# Patient Record
Sex: MALE | Race: BLACK OR AFRICAN AMERICAN | NOT HISPANIC OR LATINO | URBAN - METROPOLITAN AREA
[De-identification: names, ages, dates, MRNs, and addresses within clinical notes are randomized per-mention and may not be internally consistent; named-entity substitution may affect disease eponyms.]

---

## 2018-11-19 ENCOUNTER — EMERGENCY (EMERGENCY)
Facility: HOSPITAL | Age: 78
LOS: 1 days | Discharge: HOME | End: 2018-11-19
Attending: EMERGENCY MEDICINE | Admitting: EMERGENCY MEDICINE
Payer: MEDICARE

## 2018-11-19 VITALS
DIASTOLIC BLOOD PRESSURE: 71 MMHG | HEART RATE: 86 BPM | RESPIRATION RATE: 18 BRPM | TEMPERATURE: 99 F | SYSTOLIC BLOOD PRESSURE: 135 MMHG

## 2018-11-19 VITALS
SYSTOLIC BLOOD PRESSURE: 142 MMHG | RESPIRATION RATE: 16 BRPM | HEART RATE: 95 BPM | DIASTOLIC BLOOD PRESSURE: 67 MMHG | OXYGEN SATURATION: 100 % | TEMPERATURE: 99 F

## 2018-11-19 PROCEDURE — 73562 X-RAY EXAM OF KNEE 3: CPT | Mod: 26,LT

## 2018-11-19 PROCEDURE — 99283 EMERGENCY DEPT VISIT LOW MDM: CPT

## 2018-11-19 RX ORDER — LIDOCAINE 4 G/100G
1 CREAM TOPICAL ONCE
Qty: 0 | Refills: 0 | Status: COMPLETED | OUTPATIENT
Start: 2018-11-19 | End: 2018-11-19

## 2018-11-19 RX ORDER — ACETAMINOPHEN 500 MG
650 TABLET ORAL ONCE
Qty: 0 | Refills: 0 | Status: COMPLETED | OUTPATIENT
Start: 2018-11-19 | End: 2018-11-19

## 2018-11-19 RX ADMIN — LIDOCAINE 1 PATCH: 4 CREAM TOPICAL at 17:10

## 2018-11-19 RX ADMIN — Medication 650 MILLIGRAM(S): at 17:10

## 2018-11-19 NOTE — ED PROVIDER NOTE - PROGRESS NOTE DETAILS
KATHYA Fernandez: Received sign out from KATHYA WANG to reassess patient and follow up on XR L knee. XR unremarkable. Pt able to ambulate w/ knee immobilizer and cane. Will dc w/ close ortho follow up

## 2018-11-19 NOTE — ED PROVIDER NOTE - OBJECTIVE STATEMENT
78 y/o M pt with PMHx of DM, BIB son, arrives to the ED c/o left knee pain/swelling and difficulty bearing weight s/p twisting his left knee 1 week ago. Pt reports that he has been walking earlier this morning when he "almost fell," leading to onset of left knee pain. Tylenol, last taken last week, for no relief. Ambulatory normally without any assistance. Denies numbness/tingling or any other complaints. NKDA.

## 2018-11-19 NOTE — ED PROVIDER NOTE - LOWER EXTREMITY EXAM, LEFT
edema to left knee with no obvious deformity. knee joint is mildly warm to touch. able to extend against gravity. no calf TTP. trace pedal edema equal b/l/JOINT SWELLING/SWELLING

## 2018-11-19 NOTE — ED PROVIDER NOTE - NSFOLLOWUPINSTRUCTIONS_ED_ALL_ED_FT
Follow up with ortho this week.  Referral list provided.  Rest, ice and elevate knee.  Ambulate as tolerated with use of cane. Keep knee immobilizer on during the day.  Take Tylenol as needed for pain. Worsening pain, swelling, weakness, numbness return to ER

## 2018-11-26 ENCOUNTER — APPOINTMENT (OUTPATIENT)
Dept: INTERNAL MEDICINE | Facility: CLINIC | Age: 78
End: 2018-11-26

## 2018-11-29 ENCOUNTER — APPOINTMENT (OUTPATIENT)
Dept: ORTHOPEDIC SURGERY | Facility: CLINIC | Age: 78
End: 2018-11-29
Payer: MEDICARE

## 2018-11-29 VITALS — HEIGHT: 65 IN | WEIGHT: 155 LBS | BODY MASS INDEX: 25.83 KG/M2

## 2018-11-29 PROCEDURE — 73562 X-RAY EXAM OF KNEE 3: CPT | Mod: LT

## 2018-11-29 PROCEDURE — 99204 OFFICE O/P NEW MOD 45 MIN: CPT | Mod: 25

## 2018-11-29 PROCEDURE — 20610 DRAIN/INJ JOINT/BURSA W/O US: CPT | Mod: LT

## 2018-12-03 ENCOUNTER — OTHER (OUTPATIENT)
Age: 78
End: 2018-12-03

## 2018-12-06 ENCOUNTER — OUTPATIENT (OUTPATIENT)
Dept: OUTPATIENT SERVICES | Facility: HOSPITAL | Age: 78
LOS: 1 days | End: 2018-12-06
Payer: MEDICARE

## 2018-12-06 ENCOUNTER — APPOINTMENT (OUTPATIENT)
Dept: MRI IMAGING | Facility: CLINIC | Age: 78
End: 2018-12-06
Payer: MEDICARE

## 2018-12-06 DIAGNOSIS — M17.12 UNILATERAL PRIMARY OSTEOARTHRITIS, LEFT KNEE: ICD-10-CM

## 2018-12-06 PROCEDURE — 73721 MRI JNT OF LWR EXTRE W/O DYE: CPT

## 2018-12-06 PROCEDURE — 73721 MRI JNT OF LWR EXTRE W/O DYE: CPT | Mod: 26,LT

## 2018-12-13 ENCOUNTER — APPOINTMENT (OUTPATIENT)
Dept: ORTHOPEDIC SURGERY | Facility: CLINIC | Age: 78
End: 2018-12-13
Payer: MEDICARE

## 2018-12-13 VITALS — WEIGHT: 155 LBS | BODY MASS INDEX: 25.83 KG/M2 | HEIGHT: 65 IN

## 2018-12-13 PROCEDURE — 99214 OFFICE O/P EST MOD 30 MIN: CPT

## 2019-01-10 ENCOUNTER — APPOINTMENT (OUTPATIENT)
Dept: ORTHOPEDIC SURGERY | Facility: CLINIC | Age: 79
End: 2019-01-10
Payer: COMMERCIAL

## 2019-01-10 VITALS — BODY MASS INDEX: 25.83 KG/M2 | HEIGHT: 65 IN | WEIGHT: 155 LBS

## 2019-01-10 DIAGNOSIS — M17.12 UNILATERAL PRIMARY OSTEOARTHRITIS, LEFT KNEE: ICD-10-CM

## 2019-01-10 PROCEDURE — 73562 X-RAY EXAM OF KNEE 3: CPT | Mod: LT

## 2019-01-10 PROCEDURE — 99214 OFFICE O/P EST MOD 30 MIN: CPT

## 2019-01-11 PROBLEM — M17.12 ARTHRITIS OF KNEE, LEFT: Status: ACTIVE | Noted: 2018-11-29

## 2019-01-11 NOTE — PHYSICAL EXAM
[Poor Appearance] : well-appearing [Acute Distress] : not in acute distress [Obese] : obese [FreeTextEntry2] : Patient appears stated age in no acute respiratory distress. Patient is alert oriented x3. Patient has normal mood and affect. Gait examination is  patient is in a wheelchair\par Right knee exam\par Range of motion of the knee is 0-120°. \par Skin is normal.  No rash.\par There is no effusion. No medial or lateral joint line tenderness. No swelling, no pitting edema.\par Overall alignment of the knee is then slight varus. Good anterior posterior stability. Firm endpoints on anterior and posterior drawer. \par Medial lateral stability is intact. Firm endpoint on medial and lateral stress testing .\par Lea test is negative.\par Quadriceps strength 5/ 5. There is no loss of muscle volume in the thigh. \par Good anterior posterior and mediolateral stability.\par Sensation in the extremities intact. \par Discrimination is intact. Good DP and PT pulses.\par 		\par \par Bilateral hip exam\par On inspection of the hip shows skin is normal. No evidence of rash. \par No loss of muscle.  Abductor strength is 5 out of 5. Hip flexor strength is 5.\par Range of motion of the hip at 90° flexion internal rotation is 15° external rotation is 30° pain-free. \par Hip has good stability in anterior and posterior direction. \par On lateral decubitus  examination there is no tenderness in the greater trochanter. \par Lower Extremity Examination \par Bilateral lower extremity skin is normal. There is no rash. There is no edema and lymphadenopathy.  DP and PT pulses intact. Sensation is intact.\par \par Left knee exam\par There is minimal to moderate effusion. Range of motion is 0-120°. Painful at the extremes of range of motion. \par There is medial and lateral joint line tenderness. \par Quadriceps strength is 4/5. There is some muscle loss in the quadriceps. \par Anteroposterior and mediolateral stability is intact Lea test is negative. Alignment of the knee is in 5° of varus.\par  [de-identified] : x-rays of the eft knee 3 view  moderate arthritis\par \par MRI of the left knee was MRI shows the medial side compression fracture and a subchondral fracture without any gross evidence of depression compression\par \par X-rays from today show some mild depressed medial plateau

## 2019-01-11 NOTE — HISTORY OF PRESENT ILLNESS
[Pain] : pain [Worsening] : worsening [___ mths] : [unfilled] month(s) ago [6] : currently ~his/her~ pain is 6 out of 10 [Walking] : walking [Intermit.] : ~He/She~ states the symptoms seem to be intermittent [All Hx] : past medical, family, and social [All] : medication and allergy [All Other ROS Normal] : All other review of systems are negative except as noted [Joint Stiffness] : joint stiffness [Muscle Aches] : muscle aches [Chills] : no chills [Fever] : no fever [Wt. Gain___lbs] : no recent weight gain [Wt. Loss___lbs] : no recent ~M [unfilled] weight loss [Joint Pain] : no joint pain [Chest Pain] : no chest pain [Edema] : no edema [Palpitations] : no palpitations [PND] : no PND [Cough] : no cough [Dyspnea] : no shortness of breath [Pain w/ Breathing] : no pleuritic chest [SOB on Exertion] : no shortness of breath during exertion [FreeTextEntry1] : 76 y/o male complains of left knee pain.\par  [FreeTextEntry2] : Patient presents to office walking with a cane following up for left knee pain, since last visit symptoms have improved 70%. \par patient was seen at [facility].  images were obtained.  he ha been taking Ibuprofen that helps pain somewhat.  pain is constant and is worsened by walking.  \par The patient is having severe difficulty putting weight on his leg. He is a healthy 77-year-old man for a roll he was not walking with the cane now he cannot right on the legs a\par \par No trauma fevers or chills

## 2019-01-11 NOTE — DISCUSSION/SUMMARY
[de-identified] : Patient has healing medial tibial plateau fracture with mild depression. Patient is advised to continue activity as tolerated. 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 Continue duty as tolerated follow up in 6 months

## 2019-10-04 NOTE — ED PROVIDER NOTE - AREA
Only rest portion of stress test completed..  Stress portion canceled by patient due to insurance concerns and need to reschedule vascular surgery secondary to need for optimization of diabetic control.  Will need to be rescheduled prior to clearance once his insurance situation figured out.    He had a large fixed defect in the inferior/inferolateral wall.  LVEF 30% with global hypokinesis.  This is consistent with known anatomy.  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  Therefore this is an expected finding.  Still needs the stress portion to exclude presence of reversible defects in other territories.   Continue with present medical management    Will forward an update to his PCP as well. generalized

## 2021-01-12 NOTE — ED ADULT TRIAGE NOTE - CHIEF COMPLAINT QUOTE
Pt w/ hx of prostate CA w/ seeds DM, c/o Left knee pain since twisting his knee while gardening today.  pt reports pain only when weight bearing, Pt can walk though slowly and with pain.  Pt denies loss of sensation or ROM to LLE
n/a

## 2021-02-04 ENCOUNTER — EMERGENCY (EMERGENCY)
Facility: HOSPITAL | Age: 81
LOS: 1 days | Discharge: HOME | End: 2021-02-04
Attending: EMERGENCY MEDICINE | Admitting: EMERGENCY MEDICINE
Payer: MEDICARE

## 2021-02-04 VITALS
HEART RATE: 92 BPM | TEMPERATURE: 98 F | HEIGHT: 63 IN | OXYGEN SATURATION: 100 % | SYSTOLIC BLOOD PRESSURE: 134 MMHG | RESPIRATION RATE: 16 BRPM | DIASTOLIC BLOOD PRESSURE: 66 MMHG

## 2021-02-04 PROCEDURE — 99284 EMERGENCY DEPT VISIT MOD MDM: CPT | Mod: GC

## 2021-02-04 NOTE — ED ADULT TRIAGE NOTE - CHIEF COMPLAINT QUOTE
Pt was sent in by PMD to rule out right leg DVT. pt c/o right leg swelling and pain x 2 weeks and generalized  sharp chest pain x 2 days. Pt right left noted to be swollen and warm to touch. No complaints of  headache, nausea, dizziness, vomiting  SOB, fever, chills verbalized.

## 2021-02-05 VITALS
DIASTOLIC BLOOD PRESSURE: 60 MMHG | SYSTOLIC BLOOD PRESSURE: 129 MMHG | RESPIRATION RATE: 17 BRPM | OXYGEN SATURATION: 99 % | TEMPERATURE: 98 F | HEART RATE: 72 BPM

## 2021-02-05 LAB
ALBUMIN SERPL ELPH-MCNC: 3.7 G/DL — SIGNIFICANT CHANGE UP (ref 3.3–5)
ALP SERPL-CCNC: 87 U/L — SIGNIFICANT CHANGE UP (ref 40–120)
ALT FLD-CCNC: 24 U/L — SIGNIFICANT CHANGE UP (ref 4–41)
ANION GAP SERPL CALC-SCNC: 11 MMOL/L — SIGNIFICANT CHANGE UP (ref 7–14)
APTT BLD: 28.6 SEC — SIGNIFICANT CHANGE UP (ref 27–36.3)
AST SERPL-CCNC: 25 U/L — SIGNIFICANT CHANGE UP (ref 4–40)
BASOPHILS # BLD AUTO: 0.03 K/UL — SIGNIFICANT CHANGE UP (ref 0–0.2)
BASOPHILS NFR BLD AUTO: 0.5 % — SIGNIFICANT CHANGE UP (ref 0–2)
BILIRUB SERPL-MCNC: 0.4 MG/DL — SIGNIFICANT CHANGE UP (ref 0.2–1.2)
BUN SERPL-MCNC: 20 MG/DL — SIGNIFICANT CHANGE UP (ref 7–23)
CALCIUM SERPL-MCNC: 9.6 MG/DL — SIGNIFICANT CHANGE UP (ref 8.4–10.5)
CHLORIDE SERPL-SCNC: 107 MMOL/L — SIGNIFICANT CHANGE UP (ref 98–107)
CO2 SERPL-SCNC: 25 MMOL/L — SIGNIFICANT CHANGE UP (ref 22–31)
CREAT SERPL-MCNC: 1.18 MG/DL — SIGNIFICANT CHANGE UP (ref 0.5–1.3)
EOSINOPHIL # BLD AUTO: 0.22 K/UL — SIGNIFICANT CHANGE UP (ref 0–0.5)
EOSINOPHIL NFR BLD AUTO: 3.9 % — SIGNIFICANT CHANGE UP (ref 0–6)
GLUCOSE SERPL-MCNC: 137 MG/DL — HIGH (ref 70–99)
HCT VFR BLD CALC: 33.9 % — LOW (ref 39–50)
HGB BLD-MCNC: 10.4 G/DL — LOW (ref 13–17)
IANC: 3.24 K/UL — SIGNIFICANT CHANGE UP (ref 1.5–8.5)
IMM GRANULOCYTES NFR BLD AUTO: 0.5 % — SIGNIFICANT CHANGE UP (ref 0–1.5)
INR BLD: 1.19 RATIO — HIGH (ref 0.88–1.16)
LYMPHOCYTES # BLD AUTO: 1.82 K/UL — SIGNIFICANT CHANGE UP (ref 1–3.3)
LYMPHOCYTES # BLD AUTO: 32 % — SIGNIFICANT CHANGE UP (ref 13–44)
MCHC RBC-ENTMCNC: 25.6 PG — LOW (ref 27–34)
MCHC RBC-ENTMCNC: 30.7 GM/DL — LOW (ref 32–36)
MCV RBC AUTO: 83.5 FL — SIGNIFICANT CHANGE UP (ref 80–100)
MONOCYTES # BLD AUTO: 0.35 K/UL — SIGNIFICANT CHANGE UP (ref 0–0.9)
MONOCYTES NFR BLD AUTO: 6.2 % — SIGNIFICANT CHANGE UP (ref 2–14)
NEUTROPHILS # BLD AUTO: 3.24 K/UL — SIGNIFICANT CHANGE UP (ref 1.8–7.4)
NEUTROPHILS NFR BLD AUTO: 56.9 % — SIGNIFICANT CHANGE UP (ref 43–77)
NRBC # BLD: 0 /100 WBCS — SIGNIFICANT CHANGE UP
NRBC # FLD: 0 K/UL — SIGNIFICANT CHANGE UP
PLATELET # BLD AUTO: 268 K/UL — SIGNIFICANT CHANGE UP (ref 150–400)
POTASSIUM SERPL-MCNC: 5.1 MMOL/L — SIGNIFICANT CHANGE UP (ref 3.5–5.3)
POTASSIUM SERPL-SCNC: 5.1 MMOL/L — SIGNIFICANT CHANGE UP (ref 3.5–5.3)
PROT SERPL-MCNC: 6.7 G/DL — SIGNIFICANT CHANGE UP (ref 6–8.3)
PROTHROM AB SERPL-ACNC: 13.5 SEC — SIGNIFICANT CHANGE UP (ref 10.6–13.6)
RBC # BLD: 4.06 M/UL — LOW (ref 4.2–5.8)
RBC # FLD: 17.2 % — HIGH (ref 10.3–14.5)
SARS-COV-2 RNA SPEC QL NAA+PROBE: SIGNIFICANT CHANGE UP
SODIUM SERPL-SCNC: 143 MMOL/L — SIGNIFICANT CHANGE UP (ref 135–145)
WBC # BLD: 5.69 K/UL — SIGNIFICANT CHANGE UP (ref 3.8–10.5)
WBC # FLD AUTO: 5.69 K/UL — SIGNIFICANT CHANGE UP (ref 3.8–10.5)

## 2021-02-05 PROCEDURE — 93971 EXTREMITY STUDY: CPT | Mod: 26,LT

## 2021-02-05 RX ADMIN — Medication 300 MILLIGRAM(S): at 06:26

## 2021-02-05 NOTE — ED PROVIDER NOTE - PATIENT PORTAL LINK FT
You can access the FollowMyHealth Patient Portal offered by [facility] by registering at the following website: [email or web address]. By joining [facility]’s FollowMyHealth portal, you will also be able to view your health information using other applications (apps) compatible with our system.

## 2021-02-05 NOTE — ED PROVIDER NOTE - ATTENDING CONTRIBUTION TO CARE
79 y/o diabetic M sent for RLE swelling.  Pt reports 2 weeks of progressive redness and swelling to RLE.  He went to see his PMD today for routine follow-up and sent in to ER to r/o DVT.  Pt denies pain to extremity.  No recent fall or injury.  No fever, chills, cp, sob, palpitations, abd pain, n/v.  Well appearing elderly male, lying comfortably in stretcher, awake and alert, nontoxic.  AF/VSS.  Lungs cta bl.  Cards nl S1/S2, RRR, no MRG.  Abd soft ntnd.  (+)redness, warmth, swelling and induration to RLE to calf extending to medial R knee.  No crepitus, +right calf tenderness.  Normal distal cap refill to  bilat lower ext with 2+ DP pulses.  Concern for dvt vs cellulitis, no e/o necrotizing infection, no trauma.  Will obtain labs, dvt study, reassess.

## 2021-02-05 NOTE — ED PROVIDER NOTE - PROGRESS NOTE DETAILS
Patient will cellulitis. Will tx with clinda 300mg one dose here and snet the rest to his pharmacy. HE will follow up with his doctor in the next few days. PT seen and reassessed.  Patient symptomatically improved.   AAOX3, NAD, VSS.  Discussed test results w/ patient. Patient verbalized understanding of hospital course and outpatient plans, has decisional making capacity.  Will follow-up with Primary care doctor in the next 5-7 days; patient will call for an appointment. Will return to the ED if there is any worsening of symptoms or development of new symptoms.  Patient able to ambulate w/o difficulty, is tolerating PO intake

## 2021-02-05 NOTE — ED ADULT NURSE NOTE - OBJECTIVE STATEMENT
pt arrives w/ c/o right leg pain x 2 days. pt states he was sent by MD to r/o DVT. pt states his right leg is more swollen than left leg. pt also c/o chest pain x 2 days. pt denies any sob, fever, cough, chills. Pt resting comfortable and in NAD. waiting further orders will continue to monitor.

## 2021-02-05 NOTE — ED PROVIDER NOTE - NS ED ROS FT
Gen: No fever, normal appetite  Eyes: No eye irritation or discharge  ENT: No ear pain, congestion, sore throat  Resp: No cough or trouble breathing  Cardiovascular: No chest pain or palpitation  Gastroenteric: No nausea/vomiting, diarrhea, constipation  :  No change in urine output; no dysuria  MS: No joint or muscle pain  Skin: No rashes  Neuro: No headache; no abnormal movements  Remainder negative, except as per the HPI.

## 2021-02-05 NOTE — ED PROVIDER NOTE - NSFOLLOWUPINSTRUCTIONS_ED_ALL_ED_FT
You were seen for cellulitis.     Seek immediate medical assistance for any new or worsening symptoms.     Clindamycin, an antibiotic, was sent your pharmacy. Take 4 pills per day for 5 days.     Please take 600 mg of Ibuprofen (aka Motrin, Advil) and/or 650 mg Acetaminophen (aka Tylenol) every 6 hours, as needed, for mild-moderate pain.     Follow up with your primary care doctor this week.

## 2021-02-05 NOTE — ED PROVIDER NOTE - OBJECTIVE STATEMENT
80M, pm DM, sent in by PCP for right lower leg swelling and pain and redness concerning for DVT. Patient denies cough hemoptysis sob chest pain. No loc. Not on blood thinners.

## 2021-02-05 NOTE — ED PROVIDER NOTE - CLINICAL SUMMARY MEDICAL DECISION MAKING FREE TEXT BOX
[place] - elderly male presents with right lower leg swelling redness pain, concerning for DVT. Will ultrasound. If DVT - will start AC and d/c with PCP follow up.

## 2021-02-05 NOTE — ED PROVIDER NOTE - PHYSICAL EXAMINATION
Physical Exam:    I have reviewed the triage vital signs.    Gen: NAD, AOx3, non-toxic appearing, able to ambulate without assistance  Head and Neck: NCAT, Neck supple without meningismus   HEENT: EOMI, PEERLA, normal conjunctiva, tongue midline, oral mucosa moist  Lung: CTAB, no respiratory distress, no wheezes/rhonchi/rales B/L, speaking in full sentences  CV: RRR, no murmurs, rubs or gallops  Abd: soft, NT, ND, no guarding, no rigidity, no rebound tenderness, no CVA tenderness, no masses.   MSK: no gross deformities, ROM normal in UE/LE, no back tenderness. RLE- erythema, swelling, ttp  right calf. No palpaple cord.   Neuro: CNs II-XII grossly intact. No focal sensory or motor deficits  Skin: Warm, well perfused, no rash, no leg swelling  Psych: Appropriate mood and affect

## 2021-02-05 NOTE — ED PROVIDER NOTE - PSH
Arm fracture, right  during childhood  H/O left inguinal hernia repair    S/P Appendectomy    S/P colonoscopy with polypectomy  2011 - benign  S/P laparoscopic cholecystectomy  2009

## 2021-03-15 ENCOUNTER — APPOINTMENT (OUTPATIENT)
Dept: VASCULAR SURGERY | Facility: CLINIC | Age: 81
End: 2021-03-15
Payer: MEDICARE

## 2021-03-15 VITALS
BODY MASS INDEX: 26.14 KG/M2 | WEIGHT: 155 LBS | DIASTOLIC BLOOD PRESSURE: 87 MMHG | HEIGHT: 64.5 IN | SYSTOLIC BLOOD PRESSURE: 147 MMHG | HEART RATE: 109 BPM

## 2021-03-15 VITALS — TEMPERATURE: 97.7 F

## 2021-03-15 DIAGNOSIS — L97.929 NON-PRESSURE CHRONIC ULCER OF UNSPECIFIED PART OF RIGHT LOWER LEG WITH UNSPECIFIED SEVERITY: ICD-10-CM

## 2021-03-15 DIAGNOSIS — L97.919 NON-PRESSURE CHRONIC ULCER OF UNSPECIFIED PART OF RIGHT LOWER LEG WITH UNSPECIFIED SEVERITY: ICD-10-CM

## 2021-03-15 DIAGNOSIS — I73.9 PERIPHERAL VASCULAR DISEASE, UNSPECIFIED: ICD-10-CM

## 2021-03-15 PROCEDURE — 99203 OFFICE O/P NEW LOW 30 MIN: CPT | Mod: 25

## 2021-03-15 PROCEDURE — 99072 ADDL SUPL MATRL&STAF TM PHE: CPT

## 2021-03-15 PROCEDURE — 29580 STRAPPING UNNA BOOT: CPT | Mod: 50

## 2021-03-15 PROCEDURE — 93923 UPR/LXTR ART STDY 3+ LVLS: CPT

## 2021-03-15 PROCEDURE — 93970 EXTREMITY STUDY: CPT

## 2021-03-15 NOTE — CONSULT LETTER
[Dear  ___] : Dear  [unfilled], [Consult Letter:] : I had the pleasure of evaluating your patient, [unfilled]. [Please see my note below.] : Please see my note below. [Consult Closing:] : Thank you very much for allowing me to participate in the care of this patient.  If you have any questions, please do not hesitate to contact me. [Sincerely,] : Sincerely, [FreeTextEntry3] : Skip Zepeda M.D., F.MAGEDS., R.[email or web address].\par  of Vascular Surgery\par Assistant Professor of Radiology\par Director of Endovascular Program/ [facility] Center\par [facility]

## 2021-03-15 NOTE — PHYSICAL EXAM
[JVD] : no jugular venous distention  [Normal Breath Sounds] : Normal breath sounds [Normal Heart Sounds] : normal heart sounds [2+] : left 2+ [Ankle Swelling (On Exam)] : present [Ankle Swelling On The Right] : of the right ankle [Ankle Swelling On The Left] : moderate [Varicose Veins Of Lower Extremities] : not present [] : not present [Abdomen Masses] : No abdominal masses [Skin Ulcer] : ulcer [Alert] : alert [Oriented to Person] : oriented to person [Oriented to Place] : oriented to place [de-identified] : Bilateral medial ankle superficial ulceration with no evidence of cellulitis or drainage.  Right lower extremity is more swollen with thickening of the skin.

## 2021-03-15 NOTE — HISTORY OF PRESENT ILLNESS
[FreeTextEntry1] : Patient is an 80-year-old male with past medical history significant for diabetes, hypertension who presents to us for evaluation of bilateral lower extremity ulcerations.  Patient recently had a hospital admission for right lower extremity swelling.  There was no evidence of DVT at the time.  Patient was treated with antibiotics for treatment of cellulitis with improvement of symptoms.  Patient continues to have significant swelling and redness of the right lower extremity but denies any fevers chills or significant pain.  Patient has small superficial ulcerations of bilateral lower leg which currently being treated with local wound care at the wound care clinic.

## 2021-03-15 NOTE — ASSESSMENT
[FreeTextEntry1] : Patient with resolving cellulitis of the right lower extremity.  There is no evidence of significant arterial insufficiency.  There is no evidence of DVT of right lower extremity.  There is no significant reflux.  Recommend continuation of local wound care with Unna boots and elevation.

## 2021-03-16 PROBLEM — L97.919 ULCERS OF BOTH LOWER EXTREMITIES: Status: ACTIVE | Noted: 2021-03-15

## 2021-03-25 RX ORDER — DOXYCYCLINE HYCLATE 100 MG/1
100 CAPSULE ORAL
Qty: 20 | Refills: 0 | Status: DISCONTINUED | COMMUNITY
Start: 2021-03-04 | End: 2021-03-25

## 2021-03-25 RX ORDER — CLINDAMYCIN HYDROCHLORIDE 150 MG/1
150 CAPSULE ORAL
Qty: 39 | Refills: 0 | Status: DISCONTINUED | COMMUNITY
Start: 2021-02-05 | End: 2021-03-25

## 2021-03-27 ENCOUNTER — APPOINTMENT (OUTPATIENT)
Dept: CT IMAGING | Facility: IMAGING CENTER | Age: 81
End: 2021-03-27
Payer: MEDICARE

## 2021-03-27 ENCOUNTER — OUTPATIENT (OUTPATIENT)
Dept: OUTPATIENT SERVICES | Facility: HOSPITAL | Age: 81
LOS: 1 days | End: 2021-03-27
Payer: COMMERCIAL

## 2021-03-27 DIAGNOSIS — L97.919 NON-PRESSURE CHRONIC ULCER OF UNSPECIFIED PART OF RIGHT LOWER LEG WITH UNSPECIFIED SEVERITY: ICD-10-CM

## 2021-03-27 PROCEDURE — 74174 CTA ABD&PLVS W/CONTRAST: CPT | Mod: 26

## 2021-03-27 PROCEDURE — 74174 CTA ABD&PLVS W/CONTRAST: CPT

## 2022-05-05 ENCOUNTER — INPATIENT (INPATIENT)
Facility: HOSPITAL | Age: 82
LOS: 4 days | Discharge: HOME | End: 2022-05-10
Attending: HOSPITALIST | Admitting: HOSPITALIST
Payer: MEDICARE

## 2022-05-05 VITALS
OXYGEN SATURATION: 100 % | DIASTOLIC BLOOD PRESSURE: 66 MMHG | HEIGHT: 63 IN | HEART RATE: 101 BPM | SYSTOLIC BLOOD PRESSURE: 125 MMHG | RESPIRATION RATE: 18 BRPM | TEMPERATURE: 98 F

## 2022-05-05 DIAGNOSIS — N50.89 OTHER SPECIFIED DISORDERS OF THE MALE GENITAL ORGANS: ICD-10-CM

## 2022-05-05 LAB
ALBUMIN SERPL ELPH-MCNC: 3.6 G/DL — SIGNIFICANT CHANGE UP (ref 3.3–5)
ALP SERPL-CCNC: 79 U/L — SIGNIFICANT CHANGE UP (ref 40–120)
ALT FLD-CCNC: 56 U/L — HIGH (ref 4–41)
ANION GAP SERPL CALC-SCNC: 10 MMOL/L — SIGNIFICANT CHANGE UP (ref 7–14)
APPEARANCE UR: CLEAR — SIGNIFICANT CHANGE UP
AST SERPL-CCNC: 41 U/L — HIGH (ref 4–40)
BACTERIA # UR AUTO: NEGATIVE — SIGNIFICANT CHANGE UP
BASOPHILS # BLD AUTO: 0.03 K/UL — SIGNIFICANT CHANGE UP (ref 0–0.2)
BASOPHILS NFR BLD AUTO: 0.6 % — SIGNIFICANT CHANGE UP (ref 0–2)
BILIRUB SERPL-MCNC: 0.4 MG/DL — SIGNIFICANT CHANGE UP (ref 0.2–1.2)
BILIRUB UR-MCNC: NEGATIVE — SIGNIFICANT CHANGE UP
BUN SERPL-MCNC: 27 MG/DL — HIGH (ref 7–23)
CALCIUM SERPL-MCNC: 8.8 MG/DL — SIGNIFICANT CHANGE UP (ref 8.4–10.5)
CHLORIDE SERPL-SCNC: 109 MMOL/L — HIGH (ref 98–107)
CO2 SERPL-SCNC: 23 MMOL/L — SIGNIFICANT CHANGE UP (ref 22–31)
COLOR SPEC: ABNORMAL
CREAT SERPL-MCNC: 1.11 MG/DL — SIGNIFICANT CHANGE UP (ref 0.5–1.3)
DIFF PNL FLD: NEGATIVE — SIGNIFICANT CHANGE UP
EGFR: 67 ML/MIN/1.73M2 — SIGNIFICANT CHANGE UP
EOSINOPHIL # BLD AUTO: 0.08 K/UL — SIGNIFICANT CHANGE UP (ref 0–0.5)
EOSINOPHIL NFR BLD AUTO: 1.6 % — SIGNIFICANT CHANGE UP (ref 0–6)
EPI CELLS # UR: 1 /HPF — SIGNIFICANT CHANGE UP (ref 0–5)
GLUCOSE BLDC GLUCOMTR-MCNC: 101 MG/DL — HIGH (ref 70–99)
GLUCOSE SERPL-MCNC: 116 MG/DL — HIGH (ref 70–99)
GLUCOSE UR QL: NEGATIVE — SIGNIFICANT CHANGE UP
HCT VFR BLD CALC: 36 % — LOW (ref 39–50)
HGB BLD-MCNC: 11.9 G/DL — LOW (ref 13–17)
IANC: 3.03 K/UL — SIGNIFICANT CHANGE UP (ref 1.8–7.4)
IMM GRANULOCYTES NFR BLD AUTO: 0.2 % — SIGNIFICANT CHANGE UP (ref 0–1.5)
KETONES UR-MCNC: NEGATIVE — SIGNIFICANT CHANGE UP
LEUKOCYTE ESTERASE UR-ACNC: NEGATIVE — SIGNIFICANT CHANGE UP
LYMPHOCYTES # BLD AUTO: 1.3 K/UL — SIGNIFICANT CHANGE UP (ref 1–3.3)
LYMPHOCYTES # BLD AUTO: 26.1 % — SIGNIFICANT CHANGE UP (ref 13–44)
MCHC RBC-ENTMCNC: 28.1 PG — SIGNIFICANT CHANGE UP (ref 27–34)
MCHC RBC-ENTMCNC: 33.1 GM/DL — SIGNIFICANT CHANGE UP (ref 32–36)
MCV RBC AUTO: 84.9 FL — SIGNIFICANT CHANGE UP (ref 80–100)
MONOCYTES # BLD AUTO: 0.54 K/UL — SIGNIFICANT CHANGE UP (ref 0–0.9)
MONOCYTES NFR BLD AUTO: 10.8 % — SIGNIFICANT CHANGE UP (ref 2–14)
NEUTROPHILS # BLD AUTO: 3.03 K/UL — SIGNIFICANT CHANGE UP (ref 1.8–7.4)
NEUTROPHILS NFR BLD AUTO: 60.7 % — SIGNIFICANT CHANGE UP (ref 43–77)
NITRITE UR-MCNC: NEGATIVE — SIGNIFICANT CHANGE UP
NRBC # BLD: 0 /100 WBCS — SIGNIFICANT CHANGE UP
NRBC # FLD: 0 K/UL — SIGNIFICANT CHANGE UP
NT-PROBNP SERPL-SCNC: 3872 PG/ML — HIGH
PH UR: 7 — SIGNIFICANT CHANGE UP (ref 5–8)
PLATELET # BLD AUTO: 230 K/UL — SIGNIFICANT CHANGE UP (ref 150–400)
POTASSIUM SERPL-MCNC: 5.2 MMOL/L — SIGNIFICANT CHANGE UP (ref 3.5–5.3)
POTASSIUM SERPL-SCNC: 5.2 MMOL/L — SIGNIFICANT CHANGE UP (ref 3.5–5.3)
PROT SERPL-MCNC: 5.6 G/DL — LOW (ref 6–8.3)
PROT UR-MCNC: ABNORMAL
RBC # BLD: 4.24 M/UL — SIGNIFICANT CHANGE UP (ref 4.2–5.8)
RBC # FLD: 17.6 % — HIGH (ref 10.3–14.5)
RBC CASTS # UR COMP ASSIST: 2 /HPF — SIGNIFICANT CHANGE UP (ref 0–4)
SODIUM SERPL-SCNC: 142 MMOL/L — SIGNIFICANT CHANGE UP (ref 135–145)
SP GR SPEC: 1.03 — SIGNIFICANT CHANGE UP (ref 1–1.05)
TROPONIN T, HIGH SENSITIVITY RESULT: 65 NG/L — CRITICAL HIGH
TROPONIN T, HIGH SENSITIVITY RESULT: 66 NG/L — CRITICAL HIGH
UROBILINOGEN FLD QL: SIGNIFICANT CHANGE UP
WBC # BLD: 4.99 K/UL — SIGNIFICANT CHANGE UP (ref 3.8–10.5)
WBC # FLD AUTO: 4.99 K/UL — SIGNIFICANT CHANGE UP (ref 3.8–10.5)
WBC UR QL: 1 /HPF — SIGNIFICANT CHANGE UP (ref 0–5)

## 2022-05-05 PROCEDURE — 71045 X-RAY EXAM CHEST 1 VIEW: CPT | Mod: 26

## 2022-05-05 PROCEDURE — 76870 US EXAM SCROTUM: CPT | Mod: 26

## 2022-05-05 PROCEDURE — 99284 EMERGENCY DEPT VISIT MOD MDM: CPT

## 2022-05-05 NOTE — ED PROVIDER NOTE - OBJECTIVE STATEMENT
82 y/o M w/ hx DM, Meniere's presents with testicular swelling since Sunday x4 days. Hx remote prostate CA requiring radiation seeds but no surgery. denies cp pressure palpitations sob or leg swelling. denies penile discharge hematuria. Endorses increased urinary frequency, denies fevers/chills. 82 y/o M w/ hx DM, Meniere's presents with testicular swelling since Sunday x4 days. Hx remote prostate CA requiring radiation seeds but no surgery. denies cp pressure palpitations sob or leg swelling. denies penile discharge hematuria. Endorses increased urinary frequency, denies fevers/chills. No palliative or provocative factors. No other current complaints at this time.

## 2022-05-05 NOTE — ED ADULT NURSE NOTE - OBJECTIVE STATEMENT
A&Ox4, PMH of prostate cancer, presents to ED c/o testicular/groin swelling x 4 days. Respirations are even and unlabored, sating at 100% on RA, 20 G to R AC placed, labs sent.

## 2022-05-05 NOTE — ED PROVIDER NOTE - PHYSICAL EXAMINATION
[Const] well-appearing, resting comfortably, no acute distress  [HEENT] PERRL, EOMI, moist mucus membranes  [Neck] Supple, trachea midline  [CV] +S1/S2, no m/r/g appreciated  [Lungs] Clear to auscultations bilaterally, no adventitious lung sounds  [Abd] soft, non-tender, nondistended in all 4 quadrants  [MSK] 5/5 upper extremity and lower extremity str bilaterally  [Skin] warm, dry, well-perfused  [Neuro] A&Ox3  [] BL testicular swelling without erythema or ttp

## 2022-05-05 NOTE — ED ADULT TRIAGE NOTE - CHIEF COMPLAINT QUOTE
PT C/O scrotal swelling x 6 days. Denies pain, injury or trauma to area, denies penile discharge/bleeding. PHX: HTN, HLD, DM2, Prostate CA (states in remission with no active treatement).

## 2022-05-05 NOTE — ED PROVIDER NOTE - NSICDXPASTMEDICALHX_GEN_ALL_CORE_FT
PAST MEDICAL HISTORY:  DM (Diabetes Mellitus)     Malignant neoplasm of prostate     Meniere disease

## 2022-05-05 NOTE — ED PROVIDER NOTE - ATTENDING CONTRIBUTION TO CARE
I have personally performed a history and physical examination of the patient and discussed management with the resident as well as the patient.  I reviewed the resident's note and agree with the documented findings and plan of care.  I have authored and modified critical sections of the Provider Note, including but not limited to HPI, Physical Exam and MDM.    80 y/o M w/ hx DM, Meniere's presents with testicular swelling since Sunday x4 days. Hx remote prostate CA requiring radiation seeds but no surgery. denies cp pressure palpitations sob or leg swelling. Hydrocele vs varicocele vs fluid third spacing, less likely chf. Obtain cbc, cmp, tni, bnp, scrotal US. Torsion unlikely. Dispo pending results.

## 2022-05-05 NOTE — ED PROVIDER NOTE - CLINICAL SUMMARY MEDICAL DECISION MAKING FREE TEXT BOX
80 y/o M w/ hx remote prostate CA, DM, meniere's presents with bl testicular swelling, no ttp or erythema, vss. BNP/trop elevated, ekg wnl, likely admission for echo.

## 2022-05-05 NOTE — ED PROVIDER NOTE - NSICDXPASTSURGICALHX_GEN_ALL_CORE_FT
PAST SURGICAL HISTORY:  Arm fracture, right during childhood    H/O left inguinal hernia repair     S/P Appendectomy     S/P colonoscopy with polypectomy 2011 - benign    S/P laparoscopic cholecystectomy 2009

## 2022-05-06 DIAGNOSIS — R77.8 OTHER SPECIFIED ABNORMALITIES OF PLASMA PROTEINS: ICD-10-CM

## 2022-05-06 DIAGNOSIS — M79.89 OTHER SPECIFIED SOFT TISSUE DISORDERS: ICD-10-CM

## 2022-05-06 DIAGNOSIS — N50.89 OTHER SPECIFIED DISORDERS OF THE MALE GENITAL ORGANS: ICD-10-CM

## 2022-05-06 DIAGNOSIS — I10 ESSENTIAL (PRIMARY) HYPERTENSION: ICD-10-CM

## 2022-05-06 DIAGNOSIS — Z29.9 ENCOUNTER FOR PROPHYLACTIC MEASURES, UNSPECIFIED: ICD-10-CM

## 2022-05-06 DIAGNOSIS — E11.9 TYPE 2 DIABETES MELLITUS WITHOUT COMPLICATIONS: ICD-10-CM

## 2022-05-06 LAB
A1C WITH ESTIMATED AVERAGE GLUCOSE RESULT: 5.9 % — HIGH (ref 4–5.6)
ANION GAP SERPL CALC-SCNC: 10 MMOL/L — SIGNIFICANT CHANGE UP (ref 7–14)
APTT BLD: 29.1 SEC — SIGNIFICANT CHANGE UP (ref 27–36.3)
BUN SERPL-MCNC: 26 MG/DL — HIGH (ref 7–23)
CALCIUM SERPL-MCNC: 8.9 MG/DL — SIGNIFICANT CHANGE UP (ref 8.4–10.5)
CHLORIDE SERPL-SCNC: 105 MMOL/L — SIGNIFICANT CHANGE UP (ref 98–107)
CO2 SERPL-SCNC: 26 MMOL/L — SIGNIFICANT CHANGE UP (ref 22–31)
CREAT SERPL-MCNC: 1.07 MG/DL — SIGNIFICANT CHANGE UP (ref 0.5–1.3)
EGFR: 70 ML/MIN/1.73M2 — SIGNIFICANT CHANGE UP
ESTIMATED AVERAGE GLUCOSE: 123 — SIGNIFICANT CHANGE UP
ESTIMATED AVERAGE GLUCOSE: 123 — SIGNIFICANT CHANGE UP
GLUCOSE BLDC GLUCOMTR-MCNC: 101 MG/DL — HIGH (ref 70–99)
GLUCOSE BLDC GLUCOMTR-MCNC: 108 MG/DL — HIGH (ref 70–99)
GLUCOSE BLDC GLUCOMTR-MCNC: 151 MG/DL — HIGH (ref 70–99)
GLUCOSE BLDC GLUCOMTR-MCNC: 85 MG/DL — SIGNIFICANT CHANGE UP (ref 70–99)
GLUCOSE SERPL-MCNC: 136 MG/DL — HIGH (ref 70–99)
HCT VFR BLD CALC: 39.5 % — SIGNIFICANT CHANGE UP (ref 39–50)
HGB BLD-MCNC: 13 G/DL — SIGNIFICANT CHANGE UP (ref 13–17)
INR BLD: 1.18 RATIO — HIGH (ref 0.88–1.16)
MAGNESIUM SERPL-MCNC: 1.9 MG/DL — SIGNIFICANT CHANGE UP (ref 1.6–2.6)
MCHC RBC-ENTMCNC: 27.9 PG — SIGNIFICANT CHANGE UP (ref 27–34)
MCHC RBC-ENTMCNC: 32.9 GM/DL — SIGNIFICANT CHANGE UP (ref 32–36)
MCV RBC AUTO: 84.8 FL — SIGNIFICANT CHANGE UP (ref 80–100)
NRBC # BLD: 0 /100 WBCS — SIGNIFICANT CHANGE UP
NRBC # FLD: 0 K/UL — SIGNIFICANT CHANGE UP
PHOSPHATE SERPL-MCNC: 3.9 MG/DL — SIGNIFICANT CHANGE UP (ref 2.5–4.5)
PLATELET # BLD AUTO: 234 K/UL — SIGNIFICANT CHANGE UP (ref 150–400)
POTASSIUM SERPL-MCNC: 4.9 MMOL/L — SIGNIFICANT CHANGE UP (ref 3.5–5.3)
POTASSIUM SERPL-SCNC: 4.9 MMOL/L — SIGNIFICANT CHANGE UP (ref 3.5–5.3)
PROTHROM AB SERPL-ACNC: 13.7 SEC — HIGH (ref 10.5–13.4)
RBC # BLD: 4.66 M/UL — SIGNIFICANT CHANGE UP (ref 4.2–5.8)
RBC # FLD: 17.6 % — HIGH (ref 10.3–14.5)
SARS-COV-2 RNA SPEC QL NAA+PROBE: SIGNIFICANT CHANGE UP
SODIUM SERPL-SCNC: 141 MMOL/L — SIGNIFICANT CHANGE UP (ref 135–145)
WBC # BLD: 4.63 K/UL — SIGNIFICANT CHANGE UP (ref 3.8–10.5)
WBC # FLD AUTO: 4.63 K/UL — SIGNIFICANT CHANGE UP (ref 3.8–10.5)

## 2022-05-06 PROCEDURE — 99223 1ST HOSP IP/OBS HIGH 75: CPT | Mod: GC

## 2022-05-06 PROCEDURE — 93306 TTE W/DOPPLER COMPLETE: CPT | Mod: 26

## 2022-05-06 PROCEDURE — 99223 1ST HOSP IP/OBS HIGH 75: CPT

## 2022-05-06 PROCEDURE — 12345: CPT | Mod: NC

## 2022-05-06 RX ORDER — INSULIN LISPRO 100/ML
VIAL (ML) SUBCUTANEOUS AT BEDTIME
Refills: 0 | Status: DISCONTINUED | OUTPATIENT
Start: 2022-05-06 | End: 2022-05-10

## 2022-05-06 RX ORDER — LISINOPRIL 2.5 MG/1
20 TABLET ORAL DAILY
Refills: 0 | Status: DISCONTINUED | OUTPATIENT
Start: 2022-05-06 | End: 2022-05-10

## 2022-05-06 RX ORDER — FUROSEMIDE 40 MG
40 TABLET ORAL ONCE
Refills: 0 | Status: COMPLETED | OUTPATIENT
Start: 2022-05-06 | End: 2022-05-06

## 2022-05-06 RX ORDER — GLUCAGON INJECTION, SOLUTION 0.5 MG/.1ML
1 INJECTION, SOLUTION SUBCUTANEOUS ONCE
Refills: 0 | Status: DISCONTINUED | OUTPATIENT
Start: 2022-05-06 | End: 2022-05-10

## 2022-05-06 RX ORDER — PANTOPRAZOLE SODIUM 20 MG/1
40 TABLET, DELAYED RELEASE ORAL
Refills: 0 | Status: DISCONTINUED | OUTPATIENT
Start: 2022-05-06 | End: 2022-05-10

## 2022-05-06 RX ORDER — DEXTROSE 50 % IN WATER 50 %
25 SYRINGE (ML) INTRAVENOUS ONCE
Refills: 0 | Status: DISCONTINUED | OUTPATIENT
Start: 2022-05-06 | End: 2022-05-10

## 2022-05-06 RX ORDER — GABAPENTIN 400 MG/1
100 CAPSULE ORAL AT BEDTIME
Refills: 0 | Status: DISCONTINUED | OUTPATIENT
Start: 2022-05-06 | End: 2022-05-10

## 2022-05-06 RX ORDER — SIMVASTATIN 20 MG/1
20 TABLET, FILM COATED ORAL AT BEDTIME
Refills: 0 | Status: DISCONTINUED | OUTPATIENT
Start: 2022-05-06 | End: 2022-05-10

## 2022-05-06 RX ORDER — INSULIN LISPRO 100/ML
VIAL (ML) SUBCUTANEOUS
Refills: 0 | Status: DISCONTINUED | OUTPATIENT
Start: 2022-05-06 | End: 2022-05-10

## 2022-05-06 RX ORDER — SODIUM CHLORIDE 9 MG/ML
1000 INJECTION, SOLUTION INTRAVENOUS
Refills: 0 | Status: DISCONTINUED | OUTPATIENT
Start: 2022-05-06 | End: 2022-05-10

## 2022-05-06 RX ORDER — HEPARIN SODIUM 5000 [USP'U]/ML
5000 INJECTION INTRAVENOUS; SUBCUTANEOUS EVERY 12 HOURS
Refills: 0 | Status: DISCONTINUED | OUTPATIENT
Start: 2022-05-06 | End: 2022-05-10

## 2022-05-06 RX ORDER — FUROSEMIDE 40 MG
40 TABLET ORAL
Refills: 0 | Status: DISCONTINUED | OUTPATIENT
Start: 2022-05-06 | End: 2022-05-09

## 2022-05-06 RX ORDER — DEXTROSE 50 % IN WATER 50 %
15 SYRINGE (ML) INTRAVENOUS ONCE
Refills: 0 | Status: DISCONTINUED | OUTPATIENT
Start: 2022-05-06 | End: 2022-05-10

## 2022-05-06 RX ORDER — DEXTROSE 50 % IN WATER 50 %
12.5 SYRINGE (ML) INTRAVENOUS ONCE
Refills: 0 | Status: DISCONTINUED | OUTPATIENT
Start: 2022-05-06 | End: 2022-05-10

## 2022-05-06 RX ADMIN — HEPARIN SODIUM 5000 UNIT(S): 5000 INJECTION INTRAVENOUS; SUBCUTANEOUS at 06:03

## 2022-05-06 RX ADMIN — LISINOPRIL 20 MILLIGRAM(S): 2.5 TABLET ORAL at 06:02

## 2022-05-06 RX ADMIN — PANTOPRAZOLE SODIUM 40 MILLIGRAM(S): 20 TABLET, DELAYED RELEASE ORAL at 06:02

## 2022-05-06 RX ADMIN — GABAPENTIN 100 MILLIGRAM(S): 400 CAPSULE ORAL at 21:23

## 2022-05-06 RX ADMIN — Medication 40 MILLIGRAM(S): at 01:43

## 2022-05-06 RX ADMIN — HEPARIN SODIUM 5000 UNIT(S): 5000 INJECTION INTRAVENOUS; SUBCUTANEOUS at 17:40

## 2022-05-06 RX ADMIN — SIMVASTATIN 20 MILLIGRAM(S): 20 TABLET, FILM COATED ORAL at 21:23

## 2022-05-06 RX ADMIN — Medication 40 MILLIGRAM(S): at 17:41

## 2022-05-06 RX ADMIN — Medication 40 MILLIGRAM(S): at 11:43

## 2022-05-06 RX ADMIN — Medication 2: at 12:06

## 2022-05-06 NOTE — CONSULT NOTE ADULT - ATTENDING COMMENTS
81 M with history as above here with increased LE edema and scrotal/testicular edema, found to have pulmonary hypertension.    Cxr suggestive of interstitial markings but patient not hypoxemic.  No known exposures, not smoker.    - agree with aggressive diuresis  - concern for diastolic dysfunction given dilated LA, ?mild AS  - severe pulm htn - diuresis for now, will need RHC  - agree with CT chest imaging (can get non contrast for now) given abnormal cxr

## 2022-05-06 NOTE — CONSULT NOTE ADULT - SUBJECTIVE AND OBJECTIVE BOX
CHIEF COMPLAINT:    HPI per H and P: HPI:  81 year old M PMHx Prostate cancer (Dx 2011) s/p radiation seeds, no recurrence, T2DM, HTN, Meniere's disease presenting with complaint of testicular swelling x 4 days. Patient reports complaint of increased testicular swelling since . He denies any complaint of testicular pain. denies urinary complaints, chest pain, dyspnea. Denies fever, chills, headache, vision change, chest pain, palpitations, dyspnea, abdominal pain, nausea, vomiting, melena,  hematochezia, penile discharge, hematuria.     In ED vitals: Temp 98.1F  /66 SP02 100% RA  Labs: H/H 11.9/36 Trop 65 --> 66 BUN/Cr 27/1.11 AST/ALT 41/56 ProBNP 3872   US testicles: No evidence of torsion at time of examination. Scrotal wall thickening. Small bilateral hydroceles. Small bilateral varicoceles. Tubular hypoechoic focus is seen in the left epididymis may represent tubular ectasia of the epididymis. (06 May 2022 00:05)      PAST MEDICAL & SURGICAL HISTORY:  DM (Diabetes Mellitus)    Malignant neoplasm of prostate    Meniere disease    S/P Appendectomy    S/P colonoscopy with polypectomy   - benign    H/O left inguinal hernia repair    Arm fracture, right  during childhood    S/P laparoscopic cholecystectomy          FAMILY HISTORY:  No pertinent family history in first degree relatives        SOCIAL HISTORY:  Smoking: [ ] Never Smoked [ ] Former Smoker (__ packs x ___ years) [ ] Current Smoker  (__ packs x ___ years)  Substance Use: [ ] Never Used [ ] Used ____  EtOH Use:  Occupation:  Recent Travel:  Country of Birth:      Allergies    No Known Allergies    Intolerances        HOME MEDICATIONS:     ROS  Constitutional: denies fevers, chills, night sweats, weight loss  HEENT: denies visual changes, cough  Cardiovascular: denies palpitations, chest pain, +edema  Respiratory: +SOB  Gastrointestinal: denies N/V/D, abdominal pain, hematochezia, melena  : denies dysuria, urinary urgency, increased frequency  MSK: denies muscle weakness, joint pain  Skin: denies new rashes or masses  Heme: denies bleeding, bruising  Neuro: denies headache, weakness    PHYSICAL EXAM:   GEN: Age appropriate, resting comfortably in bed, no acute distress, non toxic appearing, speaking in complete sentences.   HEENT: Conjunctiva and sclera normal  PULM: Lungs CTAB, no wheezes, rales, rhonchi  CV: RRR, S1S2, no MRG  MSK: no stiffness or joint effusions  Abdominal: Soft, nontender to palpation, non-distended, +BS  Extremities: +lower extremity edema  NEURO: AAOx3  Psych: normal affect, normal behavior  Skin: No rashes, lesions      OBJECTIVE:  ICU Vital Signs Last 24 Hrs  T(C): 36.7 (06 May 2022 11:49), Max: 36.8 (06 May 2022 01:46)  T(F): 98 (06 May 2022 11:49), Max: 98.3 (06 May 2022 01:46)  HR: 94 (06 May 2022 11:49) (80 - 94)  BP: 117/60 (06 May 2022 11:49) (114/68 - 129/80)  BP(mean): --  ABP: --  ABP(mean): --  RR: 18 (06 May 2022 11:49) (16 - 18)  SpO2: 100% (06 May 2022 11:49) (98% - 100%)         @ 07:  -  06 @ 07:00  --------------------------------------------------------  IN: 0 mL / OUT: 625 mL / NET: -625 mL     @ 07:  -   @ 17:11  --------------------------------------------------------  IN: 354 mL / OUT: 1450 mL / NET: -1096 mL      CAPILLARY BLOOD GLUCOSE      POCT Blood Glucose.: 85 mg/dL (06 May 2022 17:03)        HOSPITAL MEDICATIONS:  heparin   Injectable 5000 Unit(s) SubCutaneous every 12 hours      furosemide   Injectable 40 milliGRAM(s) IV Push two times a day  lisinopril 20 milliGRAM(s) Oral daily    dextrose 50% Injectable 25 Gram(s) IV Push once  dextrose 50% Injectable 12.5 Gram(s) IV Push once  dextrose 50% Injectable 25 Gram(s) IV Push once  dextrose Oral Gel 15 Gram(s) Oral once PRN  glucagon  Injectable 1 milliGRAM(s) IntraMuscular once  insulin lispro (ADMELOG) corrective regimen sliding scale   SubCutaneous three times a day before meals  insulin lispro (ADMELOG) corrective regimen sliding scale   SubCutaneous at bedtime  simvastatin 20 milliGRAM(s) Oral at bedtime      gabapentin 100 milliGRAM(s) Oral at bedtime    pantoprazole    Tablet 40 milliGRAM(s) Oral before breakfast        dextrose 5%. 1000 milliLiter(s) IV Continuous <Continuous>  dextrose 5%. 1000 milliLiter(s) IV Continuous <Continuous>            LABS:                        13.0   4.63  )-----------( 234      ( 06 May 2022 06:57 )             39.5     Hgb Trend: 13.0<--, 11.9<--  05-06    141  |  105  |  26<H>  ----------------------------<  136<H>  4.9   |  26  |  1.07    Ca    8.9      06 May 2022 06:57  Phos  3.9     05-06  Mg     1.90     05-06    TPro  5.6<L>  /  Alb  3.6  /  TBili  0.4  /  DBili  x   /  AST  41<H>  /  ALT  56<H>  /  AlkPhos  79  05-05    Creatinine Trend: 1.07<--, 1.11<--  PT/INR - ( 06 May 2022 06:57 )   PT: 13.7 sec;   INR: 1.18 ratio         PTT - ( 06 May 2022 06:57 )  PTT:29.1 sec  Urinalysis Basic - ( 05 May 2022 18:40 )    Color: Lacie / Appearance: Clear / S.029 / pH: x  Gluc: x / Ketone: Negative  / Bili: Negative / Urobili: <2 mg/dL   Blood: x / Protein: 100 mg/dL / Nitrite: Negative   Leuk Esterase: Negative / RBC: 2 /HPF / WBC 1 /HPF   Sq Epi: x / Non Sq Epi: 1 /HPF / Bacteria: Negative                   CHIEF COMPLAINT: swelling    HPI per H and P: HPI:  81 year old M PMHx Prostate cancer (Dx 2011) s/p radiation seeds, no recurrence, T2DM, HTN, Meniere's disease presenting with complaint of testicular swelling x 4 days. Patient reports complaint of increased testicular swelling since . He denies any complaint of testicular pain. denies urinary complaints, chest pain, dyspnea. Denies fever, chills, headache, vision change, chest pain, palpitations, dyspnea, abdominal pain, nausea, vomiting, melena,  hematochezia, penile discharge, hematuria.     In ED vitals: Temp 98.1F  /66 SP02 100% RA  Labs: H/H 11.9/36 Trop 65 --> 66 BUN/Cr 27/1.11 AST/ALT 41/56 ProBNP 3872   US testicles: No evidence of torsion at time of examination. Scrotal wall thickening. Small bilateral hydroceles. Small bilateral varicoceles. Tubular hypoechoic focus is seen in the left epididymis may represent tubular ectasia of the epididymis. (06 May 2022 00:05)      PAST MEDICAL & SURGICAL HISTORY:  DM (Diabetes Mellitus)    Malignant neoplasm of prostate    Meniere disease    S/P Appendectomy    S/P colonoscopy with polypectomy   - benign    H/O left inguinal hernia repair    Arm fracture, right  during childhood    S/P laparoscopic cholecystectomy          FAMILY HISTORY:  No pertinent family history in first degree relatives parents        SOCIAL HISTORY:  Smoking: [x ] Never Smoked [ ] Former Smoker (__ packs x ___ years) [ ] Current Smoker  (__ packs x ___ years)  Substance Use: [ ] Never Used [ ] Used ____  EtOH Use: none  Occupation:  Recent Travel:  Country of Birth:      Allergies    No Known Allergies    Intolerances        HOME MEDICATIONS:     ROS  Constitutional: denies fevers, chills, night sweats, weight loss  HEENT: denies visual changes, cough  Cardiovascular: denies palpitations, chest pain, +edema  Respiratory: +SOB  Gastrointestinal: denies N/V/D, abdominal pain, hematochezia, melena  : denies dysuria, urinary urgency, increased frequency  MSK: denies muscle weakness, joint pain  Skin: denies new rashes or masses  Heme: denies bleeding, bruising  Neuro: denies headache, weakness    PHYSICAL EXAM:   GEN: Age appropriate, resting comfortably in bed, no acute distress, non toxic appearing, speaking in complete sentences.   HEENT: Conjunctiva and sclera normal  PULM: Lungs CTAB, no wheezes, rales, rhonchi  CV: RRR, S1S2, no MRG  MSK: no stiffness or joint effusions  Abdominal: Soft, nontender to palpation, non-distended, +BS  Extremities: +lower extremity edema  NEURO: AAOx3  Psych: normal affect, normal behavior  Skin: No rashes, lesions      OBJECTIVE:  ICU Vital Signs Last 24 Hrs  T(C): 36.7 (06 May 2022 11:49), Max: 36.8 (06 May 2022 01:46)  T(F): 98 (06 May 2022 11:49), Max: 98.3 (06 May 2022 01:46)  HR: 94 (06 May 2022 11:49) (80 - 94)  BP: 117/60 (06 May 2022 11:49) (114/68 - 129/80)  BP(mean): --  ABP: --  ABP(mean): --  RR: 18 (06 May 2022 11:49) (16 - 18)  SpO2: 100% (06 May 2022 11:49) (98% - 100%)         @ 07:  -  06 @ 07:00  --------------------------------------------------------  IN: 0 mL / OUT: 625 mL / NET: -625 mL     @ 07:  -   @ 17:11  --------------------------------------------------------  IN: 354 mL / OUT: 1450 mL / NET: -1096 mL      CAPILLARY BLOOD GLUCOSE      POCT Blood Glucose.: 85 mg/dL (06 May 2022 17:03)        HOSPITAL MEDICATIONS:  heparin   Injectable 5000 Unit(s) SubCutaneous every 12 hours      furosemide   Injectable 40 milliGRAM(s) IV Push two times a day  lisinopril 20 milliGRAM(s) Oral daily    dextrose 50% Injectable 25 Gram(s) IV Push once  dextrose 50% Injectable 12.5 Gram(s) IV Push once  dextrose 50% Injectable 25 Gram(s) IV Push once  dextrose Oral Gel 15 Gram(s) Oral once PRN  glucagon  Injectable 1 milliGRAM(s) IntraMuscular once  insulin lispro (ADMELOG) corrective regimen sliding scale   SubCutaneous three times a day before meals  insulin lispro (ADMELOG) corrective regimen sliding scale   SubCutaneous at bedtime  simvastatin 20 milliGRAM(s) Oral at bedtime      gabapentin 100 milliGRAM(s) Oral at bedtime    pantoprazole    Tablet 40 milliGRAM(s) Oral before breakfast        dextrose 5%. 1000 milliLiter(s) IV Continuous <Continuous>  dextrose 5%. 1000 milliLiter(s) IV Continuous <Continuous>            LABS:                        13.0   4.63  )-----------( 234      ( 06 May 2022 06:57 )             39.5     Hgb Trend: 13.0<--, 11.9<--  05-06    141  |  105  |  26<H>  ----------------------------<  136<H>  4.9   |  26  |  1.07    Ca    8.9      06 May 2022 06:57  Phos  3.9     05-06  Mg     1.90     05-06    TPro  5.6<L>  /  Alb  3.6  /  TBili  0.4  /  DBili  x   /  AST  41<H>  /  ALT  56<H>  /  AlkPhos  79  05-05    Creatinine Trend: 1.07<--, 1.11<--  PT/INR - ( 06 May 2022 06:57 )   PT: 13.7 sec;   INR: 1.18 ratio         PTT - ( 06 May 2022 06:57 )  PTT:29.1 sec  Urinalysis Basic - ( 05 May 2022 18:40 )    Color: Lacie / Appearance: Clear / S.029 / pH: x  Gluc: x / Ketone: Negative  / Bili: Negative / Urobili: <2 mg/dL   Blood: x / Protein: 100 mg/dL / Nitrite: Negative   Leuk Esterase: Negative / RBC: 2 /HPF / WBC 1 /HPF   Sq Epi: x / Non Sq Epi: 1 /HPF / Bacteria: Negative              cxr reviewed and interpreted by me: coarse markings

## 2022-05-06 NOTE — CHART NOTE - NSCHARTNOTEFT_GEN_A_CORE
saw and examined pt  anasarca with elevated probnp, trop leak - concerning for new onset CHF  resume lasix @ 40mg IV BID, I/Os, daily weights  awaiting TTEm suspect trop leak demand   suspect scrotal edema d.t CHF - no torsion on sono  LE duplex r/o DVT as depsite edema b/l L>R  cards eval called saw and examined pt  anasarca with elevated probnp, trop leak - concerning for new onset CHF  resume lasix @ 40mg IV BID, I/Os, daily weights  awaiting TTE, suspect trop leak demand   suspect scrotal edema d.t CHF - no torsion on sono - elevate and monitor   LE duplex r/o DVT as despite edema b/l, L>R  cards eval called saw and examined pt  anasarca with elevated probnp, trop leak - concerning for new onset CHF  resume lasix @ 40mg IV BID, I/Os, daily weights  awaiting TTE, suspect trop leak demand   suspect scrotal edema d.t CHF - no torsion on sono - elevate and monitor   LE duplex r/o DVT as despite edema b/l, L>R  cards eval called      addendum - TTE w sever PHTN - otherwise some LVH but otherwise nonrevealing - no hx of COPD or interstitial lung dz, SATs ok - ? cor pulmonale - likely needs RHC - pulm called to help guide work up saw and examined pt  anasarca with elevated probnp, trop leak - concerning for new onset CHF  resume lasix @ 40mg IV BID, I/Os, daily weights  awaiting TTE, suspect trop leak demand   suspect scrotal edema d.t CHF - no torsion on sono - elevate and monitor   LE duplex r/o DVT as despite edema b/l, L>R  cards eval called      addendum - TTE w sever PHTN - otherwise some LVH but otherwise nonrevealing - no hx of COPD but cxr with "chronic interstitial lung opacities" SATs ok - ? cor pulmonale - likely needs RHC, CT chest to help delineate lung tissue, pressures - ? r/o chronic PE - pulm called to help guide work up

## 2022-05-06 NOTE — H&P ADULT - NS ATTEND AMEND GEN_ALL_CORE FT
agree w. above    Patient is an 81 year old M hx of prostate cancer s/p r/t no recurrence, DM2, HTN who presents w/ testicular swelling for 4 days. Has S3 on exam, 1-2+ pitting edema b/l LE, concern for CHF  -r/o CHF, obtain TTE, Lasix 40 mg iv times 1, strict ins and outs  -testicular swelling as above, u/a negative, US neg for torsion, no erythema lower concern for infection  -DM2 moderate sliding scale  -HTN c/w lisinopril watch creatinine

## 2022-05-06 NOTE — CONSULT NOTE ADULT - SUBJECTIVE AND OBJECTIVE BOX
CHIEF COMPLAINT:Patient is a 81y old  Male who presents with a chief complaint of testicular swelling (06 May 2022 00:05)      HISTORY OF PRESENT ILLNESS:    81 male with history as below admitted with scrotal edema and   lower ext edema   has mild sob  no cp   no dizziness  no syncope     PAST MEDICAL & SURGICAL HISTORY:  DM (Diabetes Mellitus)    Malignant neoplasm of prostate    Meniere disease    S/P Appendectomy    S/P colonoscopy with polypectomy  2011 - benign    H/O left inguinal hernia repair    Arm fracture, right  during childhood    S/P laparoscopic cholecystectomy  2009            MEDICATIONS:  furosemide   Injectable 40 milliGRAM(s) IV Push two times a day  heparin   Injectable 5000 Unit(s) SubCutaneous every 12 hours  lisinopril 20 milliGRAM(s) Oral daily        gabapentin 100 milliGRAM(s) Oral at bedtime    pantoprazole    Tablet 40 milliGRAM(s) Oral before breakfast    dextrose 50% Injectable 25 Gram(s) IV Push once  dextrose 50% Injectable 12.5 Gram(s) IV Push once  dextrose 50% Injectable 25 Gram(s) IV Push once  dextrose Oral Gel 15 Gram(s) Oral once PRN  glucagon  Injectable 1 milliGRAM(s) IntraMuscular once  insulin lispro (ADMELOG) corrective regimen sliding scale   SubCutaneous three times a day before meals  insulin lispro (ADMELOG) corrective regimen sliding scale   SubCutaneous at bedtime  simvastatin 20 milliGRAM(s) Oral at bedtime    dextrose 5%. 1000 milliLiter(s) IV Continuous <Continuous>  dextrose 5%. 1000 milliLiter(s) IV Continuous <Continuous>      FAMILY HISTORY:  No pertinent family history in first degree relatives        Non-contributory    SOCIAL HISTORY:    No tobacco, drugs or etoh    Allergies    No Known Allergies    Intolerances    	    REVIEW OF SYSTEMS:  as above  The rest of the 14 points ROS reviewed and except above they are unremarkable.        PHYSICAL EXAM:  T(C): 36.7 (05-06-22 @ 11:49), Max: 36.8 (05-06-22 @ 01:46)  HR: 94 (05-06-22 @ 11:49) (80 - 94)  BP: 117/60 (05-06-22 @ 11:49) (114/68 - 129/80)  RR: 18 (05-06-22 @ 11:49) (16 - 18)  SpO2: 100% (05-06-22 @ 11:49) (98% - 100%)  Wt(kg): --  I&O's Summary    05 May 2022 07:01  -  06 May 2022 07:00  --------------------------------------------------------  IN: 0 mL / OUT: 625 mL / NET: -625 mL    06 May 2022 07:01  -  06 May 2022 17:02  --------------------------------------------------------  IN: 354 mL / OUT: 1450 mL / NET: -1096 mL        JVP: elevated   Neck: supple  Lung: clear   CV: S1 S2 , Murmur:  Abd: soft  Ext: pos edema left more than right   neuro: Awake / alert  Psych: flat affect  Skin: normal      LABS/DATA:    TELEMETRY: 	  sinus   ECG:  	   	  CARDIAC MARKERS:                        66 <<== 05-05-22 @ 20:08                65 <<== 05-05-22 @ 17:02  < from: Transthoracic Echocardiogram (05.06.22 @ 07:11) >  CONCLUSIONS:  1. Calcified trileaflet aortic valve with mildly decreased  opening.  2. Mildly dilated left atrium.  LA volume index = 39 cc/m2.  3. Mild concentric left ventricular hypertrophy.  4. Normal left ventricular systolic function. No segmental  wall motion abnormalities.  5.Normal right ventricular size and function.  6. Estimated pulmonary artery systolic pressure equals 72  mm Hg, assuming right atrial pressure equals 10  mm Hg,  consistent with severe pulmonary hypertension.  ------------------------------------------------------------------------  Confirmed on  5/6/2022 - 11:20:34 by Dione Govea M.D. [facility]  ------------------------------------------------------------------------    < end of copied text >                              13.0   4.63  )-----------( 234      ( 06 May 2022 06:57 )             39.5     05-06    141  |  105  |  26<H>  ----------------------------<  136<H>  4.9   |  26  |  1.07    Ca    8.9      06 May 2022 06:57  Phos  3.9     05-06  Mg     1.90     05-06    TPro  5.6<L>  /  Alb  3.6  /  TBili  0.4  /  DBili  x   /  AST  41<H>  /  ALT  56<H>  /  AlkPhos  79  05-05    proBNP: Serum Pro-Brain Natriuretic Peptide: 3872 pg/mL (05-05 @ 17:02)    Lipid Profile:   HgA1c:   TSH:

## 2022-05-06 NOTE — H&P ADULT - PROBLEM SELECTOR PLAN 2
- pt with lower extremity edema left > right  - proBNP 3872  - CXR performed  - r/o CHF  - strict i's and o's  - daily weights  - 1200cc fluid restriction  - IV Lasix 40mg x 1 ordered  - TTE ordered - pt with lower extremity edema left > right  - proBNP 3872  - CXR performed  - r/o CHF  - strict i's and o's  - daily weights  - 1200cc fluid restriction  - IV Lasix 40mg x 1 ordered  - TTE ordered  - Will check VA duplex r/o DVT

## 2022-05-06 NOTE — CONSULT NOTE ADULT - ASSESSMENT
81 year old male with no pulmonary or cardiac history, chronic lower extremity edema, prostate cancer (Dx 2011) s/p radiation seeds, no recurrence, T2DM, HTN, Meniere's disease presenting with several months of SOB and lower extremity edema found to have severe pHTN.     Assessment: Volume overload and severe pHTN may be secondary to primary pulmonary process vs left heart disease. His xray shows some interstitial markings that may be chronic. May have underlying fibrosis or COPD. Would benefit from IV diuresis. Do not suspect PNA.     Plan:  Agree with IV diuresis  Would get CT chest non con to eval for fibrosis / emphysema   81 year old male with no pulmonary or cardiac history, chronic lower extremity edema, prostate cancer (Dx 2011) s/p radiation seeds, no recurrence, T2DM, HTN, Meniere's disease presenting with several months of SOB and lower extremity edema found to have severe pHTN.     Assessment: Volume overload and severe pHTN PASP 72 may be secondary to primary pulmonary process +/0 l-ft heart disease. His xray shows some interstitial markings that may be chronic. May have underlying fibrosis or COPD. Would benefit from IV diuresis. Do not suspect PNA.     Plan:  Agree with IV diuresis  Check daily weights, monitor strict ins and outs and record in [facility]  Would get CT chest non con to eval for fibrosis / emphysema  Would benefit from RHC this admission vs as an outpatient after discharge  Pulm will follow    This patient will need to follow up with the pulmonary team after discharge and will also need PFTs:  Please email: [email or web address] to setup an appointment prior to discharge with any available pulmonologist. The appointment should be within 1-2 weeks of discharge from the hospital. Include the patient's name, , MRN and contact information in the email.      [facility]  [address] [place] [address]  [phone]    Please discuss the appointment details with the patient and include the appointment details in the patients discharge summary.

## 2022-05-06 NOTE — CONSULT NOTE ADULT - ASSESSMENT
Edema  scrotal edema  lower ext edema   left more than right  agree with IV lasix   given elevated PASP will get CTPA rule out PE  agree with lower ext edema     pulm HTN  rule out PE  rule out underlying lung pathology   CTPA  pulm eval     HTN  cont current meds     DM II  Monitor finger stick. Insulin coverage. Diabetic education and Diabetic diet. Consider nutrition consultation.    HLD  on statin      Advanced care planning was discussed with patient and family.  Risks, benefits and alternatives of the cardiac treatments and medical therapy including procedures were discussed in detail and all questions were answered. Importance of compliance with medical therapy and lifestyle modification to improve cardiovascular health were addressed. Appropriate forms and patient educational materials were reviewed. 30 minutes face to face spent.

## 2022-05-06 NOTE — H&P ADULT - NSHPPHYSICALEXAM_GEN_ALL_CORE
Vital Signs Last 24 Hrs  T(C): 36.7 (05 May 2022 14:51), Max: 36.7 (05 May 2022 14:51)  T(F): 98.1 (05 May 2022 14:51), Max: 98.1 (05 May 2022 14:51)  HR: 90 (05 May 2022 21:15) (90 - 101)  BP: 129/80 (05 May 2022 21:15) (125/66 - 129/80)  BP(mean): --  RR: 18 (05 May 2022 21:15) (18 - 18)  SpO2: 100% (05 May 2022 21:15) (100% - 100%)    PHYSICAL EXAM:  General: Awake and alert.  No acute distress.  Head: Normocephalic, atraumatic.    Eyes: PERRL.  EOMI.  No scleral icterus.  No conjunctival pallor.  Mouth: Moist MM.  No oropharyngeal exudates.    Neck: Supple.  Full range of motion.  No JVD.    Heart: RRR.  +S1S2   Lungs: Nonlabored breathing.  Good inspiratory effort.  CTAB.    Abdomen: BS+, soft, NT/ND.  No hepatomegaly.   Skin: Warm and dry.  No rashes.  Extremities: No cyanosis.  2+ peripheral pulses b/l. + 2 pitting edema b/l Left leg > Right leg   Musculoskeletal: No joint deformities.  No spinal or paraspinal tenderness.  Neuro: A&Ox3. No focal deficits

## 2022-05-06 NOTE — PATIENT PROFILE ADULT - FALL HARM RISK - HARM RISK INTERVENTIONS

## 2022-05-06 NOTE — H&P ADULT - ASSESSMENT
81 year old M PMHx Prostate cancer (Dx 2011) s/p radiation seeds, no recurrence, T2DM, HTN, Meniere's disease presenting with complaint of testicular swelling x 4 days

## 2022-05-06 NOTE — H&P ADULT - PROBLEM SELECTOR PLAN 5
- monitor blood pressure  - continue lisinopril  - DASH diet - monitor blood pressure  - pt is on Lisinopril 20mg QD   - monitor Cr and K  - DASH diet - monitor blood pressure  - pt is on Lisinopril 20mg QD   - monitor Cr and K - if uptrending consider d/c ACEi   - DASH diet

## 2022-05-06 NOTE — H&P ADULT - HISTORY OF PRESENT ILLNESS
81 year old M PMHx Prostate cancer (Dx 2011) s/p radiation seeds, no recurrence, T2DM, HTN, Meniere's disease presenting with complaint of testicular swelling x 4 days. Patient reports complaint of increased testicular swelling since Sunday. He denies any complaint of testicular pain. denies urinary complaints, chest pain, dyspnea. Denies fever, chills, headache, vision change, chest pain, palpitations, dyspnea, abdominal pain, nausea, vomiting, melena,  hematochezia, penile discharge, hematuria.     In ED vitals: Temp 98.1F  /66 SP02 100% RA  Labs: H/H 11.9/36 Trop 65 --> 66 BUN/Cr 27/1.11 AST/ALT 41/56 ProBNP 3872   US testicles: No evidence of torsion at time of examination. Scrotal wall thickening. Small bilateral hydroceles. Small bilateral varicoceles. Tubular hypoechoic focus is seen in the left epididymis may represent tubular ectasia of the epididymis.

## 2022-05-06 NOTE — H&P ADULT - PROBLEM SELECTOR PLAN 1
- pt with testicular swelling x 4 days  - US Testicle: No evidence of torsion. Scrotal wall thickening. Small bilateral hydroceles. Small bilateral varicoceles. Tubular hypoechoic focus is seen in the left epididymis may represent tubular ectasia of the epididymis.  - UA negative  - pt afebrile, no leukocytosis low suspicion for infection   - pt with elevated proBNP and LE edema will r/o CHF

## 2022-05-06 NOTE — H&P ADULT - PROBLEM SELECTOR PLAN 3
- Trop 65 --> 66  - pt denies any complaints of chest pain  - EKG ordered  - monitor on tele  - TTE ordered

## 2022-05-07 LAB
ALBUMIN SERPL ELPH-MCNC: 3.3 G/DL — SIGNIFICANT CHANGE UP (ref 3.3–5)
ALP SERPL-CCNC: 74 U/L — SIGNIFICANT CHANGE UP (ref 40–120)
ALT FLD-CCNC: 50 U/L — HIGH (ref 4–41)
ANION GAP SERPL CALC-SCNC: 11 MMOL/L — SIGNIFICANT CHANGE UP (ref 7–14)
AST SERPL-CCNC: 33 U/L — SIGNIFICANT CHANGE UP (ref 4–40)
BILIRUB SERPL-MCNC: 0.7 MG/DL — SIGNIFICANT CHANGE UP (ref 0.2–1.2)
BUN SERPL-MCNC: 24 MG/DL — HIGH (ref 7–23)
CALCIUM SERPL-MCNC: 8.7 MG/DL — SIGNIFICANT CHANGE UP (ref 8.4–10.5)
CHLORIDE SERPL-SCNC: 104 MMOL/L — SIGNIFICANT CHANGE UP (ref 98–107)
CO2 SERPL-SCNC: 25 MMOL/L — SIGNIFICANT CHANGE UP (ref 22–31)
CREAT SERPL-MCNC: 1.05 MG/DL — SIGNIFICANT CHANGE UP (ref 0.5–1.3)
EGFR: 72 ML/MIN/1.73M2 — SIGNIFICANT CHANGE UP
GLUCOSE BLDC GLUCOMTR-MCNC: 121 MG/DL — HIGH (ref 70–99)
GLUCOSE BLDC GLUCOMTR-MCNC: 123 MG/DL — HIGH (ref 70–99)
GLUCOSE BLDC GLUCOMTR-MCNC: 139 MG/DL — HIGH (ref 70–99)
GLUCOSE BLDC GLUCOMTR-MCNC: 98 MG/DL — SIGNIFICANT CHANGE UP (ref 70–99)
GLUCOSE SERPL-MCNC: 115 MG/DL — HIGH (ref 70–99)
HCT VFR BLD CALC: 37.5 % — LOW (ref 39–50)
HGB BLD-MCNC: 12.3 G/DL — LOW (ref 13–17)
MAGNESIUM SERPL-MCNC: 1.9 MG/DL — SIGNIFICANT CHANGE UP (ref 1.6–2.6)
MCHC RBC-ENTMCNC: 27.5 PG — SIGNIFICANT CHANGE UP (ref 27–34)
MCHC RBC-ENTMCNC: 32.8 GM/DL — SIGNIFICANT CHANGE UP (ref 32–36)
MCV RBC AUTO: 83.7 FL — SIGNIFICANT CHANGE UP (ref 80–100)
NRBC # BLD: 0 /100 WBCS — SIGNIFICANT CHANGE UP
NRBC # FLD: 0 K/UL — SIGNIFICANT CHANGE UP
PLATELET # BLD AUTO: 228 K/UL — SIGNIFICANT CHANGE UP (ref 150–400)
POTASSIUM SERPL-MCNC: 3.5 MMOL/L — SIGNIFICANT CHANGE UP (ref 3.5–5.3)
POTASSIUM SERPL-SCNC: 3.5 MMOL/L — SIGNIFICANT CHANGE UP (ref 3.5–5.3)
PROT SERPL-MCNC: 5.7 G/DL — LOW (ref 6–8.3)
RBC # BLD: 4.48 M/UL — SIGNIFICANT CHANGE UP (ref 4.2–5.8)
RBC # FLD: 17.4 % — HIGH (ref 10.3–14.5)
SODIUM SERPL-SCNC: 140 MMOL/L — SIGNIFICANT CHANGE UP (ref 135–145)
WBC # BLD: 5.13 K/UL — SIGNIFICANT CHANGE UP (ref 3.8–10.5)
WBC # FLD AUTO: 5.13 K/UL — SIGNIFICANT CHANGE UP (ref 3.8–10.5)

## 2022-05-07 PROCEDURE — 71275 CT ANGIOGRAPHY CHEST: CPT | Mod: 26

## 2022-05-07 PROCEDURE — 99233 SBSQ HOSP IP/OBS HIGH 50: CPT

## 2022-05-07 PROCEDURE — 93970 EXTREMITY STUDY: CPT | Mod: 26

## 2022-05-07 RX ORDER — POTASSIUM CHLORIDE 20 MEQ
40 PACKET (EA) ORAL ONCE
Refills: 0 | Status: COMPLETED | OUTPATIENT
Start: 2022-05-07 | End: 2022-05-07

## 2022-05-07 RX ORDER — MAGNESIUM OXIDE 400 MG ORAL TABLET 241.3 MG
400 TABLET ORAL
Refills: 0 | Status: COMPLETED | OUTPATIENT
Start: 2022-05-07 | End: 2022-05-07

## 2022-05-07 RX ADMIN — GABAPENTIN 100 MILLIGRAM(S): 400 CAPSULE ORAL at 22:12

## 2022-05-07 RX ADMIN — SIMVASTATIN 20 MILLIGRAM(S): 20 TABLET, FILM COATED ORAL at 22:12

## 2022-05-07 RX ADMIN — Medication 40 MILLIGRAM(S): at 17:44

## 2022-05-07 RX ADMIN — HEPARIN SODIUM 5000 UNIT(S): 5000 INJECTION INTRAVENOUS; SUBCUTANEOUS at 17:44

## 2022-05-07 RX ADMIN — HEPARIN SODIUM 5000 UNIT(S): 5000 INJECTION INTRAVENOUS; SUBCUTANEOUS at 05:33

## 2022-05-07 RX ADMIN — LISINOPRIL 20 MILLIGRAM(S): 2.5 TABLET ORAL at 05:33

## 2022-05-07 RX ADMIN — MAGNESIUM OXIDE 400 MG ORAL TABLET 400 MILLIGRAM(S): 241.3 TABLET ORAL at 13:41

## 2022-05-07 RX ADMIN — Medication 40 MILLIGRAM(S): at 05:33

## 2022-05-07 RX ADMIN — PANTOPRAZOLE SODIUM 40 MILLIGRAM(S): 20 TABLET, DELAYED RELEASE ORAL at 06:14

## 2022-05-07 RX ADMIN — Medication 40 MILLIEQUIVALENT(S): at 13:41

## 2022-05-08 LAB
ALBUMIN SERPL ELPH-MCNC: 3.8 G/DL — SIGNIFICANT CHANGE UP (ref 3.3–5)
ALP SERPL-CCNC: 83 U/L — SIGNIFICANT CHANGE UP (ref 40–120)
ALT FLD-CCNC: 46 U/L — HIGH (ref 4–41)
ANION GAP SERPL CALC-SCNC: 11 MMOL/L — SIGNIFICANT CHANGE UP (ref 7–14)
ANION GAP SERPL CALC-SCNC: 11 MMOL/L — SIGNIFICANT CHANGE UP (ref 7–14)
AST SERPL-CCNC: 30 U/L — SIGNIFICANT CHANGE UP (ref 4–40)
BILIRUB SERPL-MCNC: 0.8 MG/DL — SIGNIFICANT CHANGE UP (ref 0.2–1.2)
BLD GP AB SCN SERPL QL: NEGATIVE — SIGNIFICANT CHANGE UP
BUN SERPL-MCNC: 23 MG/DL — SIGNIFICANT CHANGE UP (ref 7–23)
BUN SERPL-MCNC: 25 MG/DL — HIGH (ref 7–23)
CALCIUM SERPL-MCNC: 9.2 MG/DL — SIGNIFICANT CHANGE UP (ref 8.4–10.5)
CALCIUM SERPL-MCNC: 9.5 MG/DL — SIGNIFICANT CHANGE UP (ref 8.4–10.5)
CHLORIDE SERPL-SCNC: 101 MMOL/L — SIGNIFICANT CHANGE UP (ref 98–107)
CHLORIDE SERPL-SCNC: 99 MMOL/L — SIGNIFICANT CHANGE UP (ref 98–107)
CO2 SERPL-SCNC: 28 MMOL/L — SIGNIFICANT CHANGE UP (ref 22–31)
CO2 SERPL-SCNC: 28 MMOL/L — SIGNIFICANT CHANGE UP (ref 22–31)
CREAT SERPL-MCNC: 1.09 MG/DL — SIGNIFICANT CHANGE UP (ref 0.5–1.3)
CREAT SERPL-MCNC: 1.16 MG/DL — SIGNIFICANT CHANGE UP (ref 0.5–1.3)
EGFR: 64 ML/MIN/1.73M2 — SIGNIFICANT CHANGE UP
EGFR: 69 ML/MIN/1.73M2 — SIGNIFICANT CHANGE UP
GLUCOSE BLDC GLUCOMTR-MCNC: 100 MG/DL — HIGH (ref 70–99)
GLUCOSE BLDC GLUCOMTR-MCNC: 116 MG/DL — HIGH (ref 70–99)
GLUCOSE BLDC GLUCOMTR-MCNC: 122 MG/DL — HIGH (ref 70–99)
GLUCOSE SERPL-MCNC: 107 MG/DL — HIGH (ref 70–99)
GLUCOSE SERPL-MCNC: 113 MG/DL — HIGH (ref 70–99)
HCT VFR BLD CALC: 41.2 % — SIGNIFICANT CHANGE UP (ref 39–50)
HGB BLD-MCNC: 13.7 G/DL — SIGNIFICANT CHANGE UP (ref 13–17)
MAGNESIUM SERPL-MCNC: 2.1 MG/DL — SIGNIFICANT CHANGE UP (ref 1.6–2.6)
MCHC RBC-ENTMCNC: 27.9 PG — SIGNIFICANT CHANGE UP (ref 27–34)
MCHC RBC-ENTMCNC: 33.3 GM/DL — SIGNIFICANT CHANGE UP (ref 32–36)
MCV RBC AUTO: 83.9 FL — SIGNIFICANT CHANGE UP (ref 80–100)
NRBC # BLD: 0 /100 WBCS — SIGNIFICANT CHANGE UP
NRBC # FLD: 0 K/UL — SIGNIFICANT CHANGE UP
PHOSPHATE SERPL-MCNC: 4.7 MG/DL — HIGH (ref 2.5–4.5)
PLATELET # BLD AUTO: 251 K/UL — SIGNIFICANT CHANGE UP (ref 150–400)
POTASSIUM SERPL-MCNC: 5.3 MMOL/L — SIGNIFICANT CHANGE UP (ref 3.5–5.3)
POTASSIUM SERPL-MCNC: 5.4 MMOL/L — HIGH (ref 3.5–5.3)
POTASSIUM SERPL-SCNC: 5.3 MMOL/L — SIGNIFICANT CHANGE UP (ref 3.5–5.3)
POTASSIUM SERPL-SCNC: 5.4 MMOL/L — HIGH (ref 3.5–5.3)
PROT SERPL-MCNC: 6.4 G/DL — SIGNIFICANT CHANGE UP (ref 6–8.3)
RBC # BLD: 4.91 M/UL — SIGNIFICANT CHANGE UP (ref 4.2–5.8)
RBC # FLD: 17.4 % — HIGH (ref 10.3–14.5)
RH IG SCN BLD-IMP: POSITIVE — SIGNIFICANT CHANGE UP
SARS-COV-2 RNA SPEC QL NAA+PROBE: SIGNIFICANT CHANGE UP
SODIUM SERPL-SCNC: 138 MMOL/L — SIGNIFICANT CHANGE UP (ref 135–145)
SODIUM SERPL-SCNC: 140 MMOL/L — SIGNIFICANT CHANGE UP (ref 135–145)
WBC # BLD: 4.53 K/UL — SIGNIFICANT CHANGE UP (ref 3.8–10.5)
WBC # FLD AUTO: 4.53 K/UL — SIGNIFICANT CHANGE UP (ref 3.8–10.5)

## 2022-05-08 PROCEDURE — 99233 SBSQ HOSP IP/OBS HIGH 50: CPT

## 2022-05-08 RX ORDER — SODIUM ZIRCONIUM CYCLOSILICATE 10 G/10G
5 POWDER, FOR SUSPENSION ORAL ONCE
Refills: 0 | Status: COMPLETED | OUTPATIENT
Start: 2022-05-08 | End: 2022-05-08

## 2022-05-08 RX ADMIN — SODIUM ZIRCONIUM CYCLOSILICATE 5 GRAM(S): 10 POWDER, FOR SUSPENSION ORAL at 11:54

## 2022-05-08 RX ADMIN — PANTOPRAZOLE SODIUM 40 MILLIGRAM(S): 20 TABLET, DELAYED RELEASE ORAL at 05:21

## 2022-05-08 RX ADMIN — LISINOPRIL 20 MILLIGRAM(S): 2.5 TABLET ORAL at 05:21

## 2022-05-08 RX ADMIN — Medication 40 MILLIGRAM(S): at 18:18

## 2022-05-08 RX ADMIN — HEPARIN SODIUM 5000 UNIT(S): 5000 INJECTION INTRAVENOUS; SUBCUTANEOUS at 05:21

## 2022-05-08 RX ADMIN — SIMVASTATIN 20 MILLIGRAM(S): 20 TABLET, FILM COATED ORAL at 21:17

## 2022-05-08 RX ADMIN — HEPARIN SODIUM 5000 UNIT(S): 5000 INJECTION INTRAVENOUS; SUBCUTANEOUS at 18:17

## 2022-05-08 RX ADMIN — GABAPENTIN 100 MILLIGRAM(S): 400 CAPSULE ORAL at 21:17

## 2022-05-08 RX ADMIN — Medication 40 MILLIGRAM(S): at 05:21

## 2022-05-08 NOTE — PROGRESS NOTE ADULT - ASSESSMENT
Edema  scrotal edema  lower ext edema   left more than right  agree with IV lasix   prelim CTPA shows no PE  fu official result     pulm HTN  pulm eval   will get left / right heart cath     HTN  cont current meds     DM II  Monitor finger stick. Insulin coverage. Diabetic education and Diabetic diet. Consider nutrition consultation.    HLD  on statin      Advanced care planning was discussed with patient and family.  Risks, benefits and alternatives of the cardiac treatments and medical therapy including procedures were discussed in detail and all questions were answered. Importance of compliance with medical therapy and lifestyle modification to improve cardiovascular health were addressed. Appropriate forms and patient educational materials were reviewed. 30 minutes face to face spent.

## 2022-05-09 LAB
ALBUMIN SERPL ELPH-MCNC: 3.2 G/DL — LOW (ref 3.3–5)
ALP SERPL-CCNC: 76 U/L — SIGNIFICANT CHANGE UP (ref 40–120)
ALT FLD-CCNC: 36 U/L — SIGNIFICANT CHANGE UP (ref 4–41)
ANION GAP SERPL CALC-SCNC: 11 MMOL/L — SIGNIFICANT CHANGE UP (ref 7–14)
APTT BLD: 28.5 SEC — SIGNIFICANT CHANGE UP (ref 27–36.3)
AST SERPL-CCNC: 24 U/L — SIGNIFICANT CHANGE UP (ref 4–40)
BILIRUB SERPL-MCNC: 0.4 MG/DL — SIGNIFICANT CHANGE UP (ref 0.2–1.2)
BLD GP AB SCN SERPL QL: NEGATIVE — SIGNIFICANT CHANGE UP
BUN SERPL-MCNC: 30 MG/DL — HIGH (ref 7–23)
CALCIUM SERPL-MCNC: 8.8 MG/DL — SIGNIFICANT CHANGE UP (ref 8.4–10.5)
CHLORIDE SERPL-SCNC: 101 MMOL/L — SIGNIFICANT CHANGE UP (ref 98–107)
CO2 SERPL-SCNC: 28 MMOL/L — SIGNIFICANT CHANGE UP (ref 22–31)
CREAT SERPL-MCNC: 1.32 MG/DL — HIGH (ref 0.5–1.3)
EGFR: 55 ML/MIN/1.73M2 — LOW
GLUCOSE BLDC GLUCOMTR-MCNC: 103 MG/DL — HIGH (ref 70–99)
GLUCOSE BLDC GLUCOMTR-MCNC: 121 MG/DL — HIGH (ref 70–99)
GLUCOSE BLDC GLUCOMTR-MCNC: 154 MG/DL — HIGH (ref 70–99)
GLUCOSE BLDC GLUCOMTR-MCNC: 86 MG/DL — SIGNIFICANT CHANGE UP (ref 70–99)
GLUCOSE BLDC GLUCOMTR-MCNC: 92 MG/DL — SIGNIFICANT CHANGE UP (ref 70–99)
GLUCOSE BLDC GLUCOMTR-MCNC: 98 MG/DL — SIGNIFICANT CHANGE UP (ref 70–99)
GLUCOSE SERPL-MCNC: 101 MG/DL — HIGH (ref 70–99)
HCT VFR BLD CALC: 38.4 % — LOW (ref 39–50)
HGB BLD-MCNC: 12.8 G/DL — LOW (ref 13–17)
INR BLD: 1.14 RATIO — SIGNIFICANT CHANGE UP (ref 0.88–1.16)
MAGNESIUM SERPL-MCNC: 2 MG/DL — SIGNIFICANT CHANGE UP (ref 1.6–2.6)
MCHC RBC-ENTMCNC: 27.8 PG — SIGNIFICANT CHANGE UP (ref 27–34)
MCHC RBC-ENTMCNC: 33.3 GM/DL — SIGNIFICANT CHANGE UP (ref 32–36)
MCV RBC AUTO: 83.5 FL — SIGNIFICANT CHANGE UP (ref 80–100)
NRBC # BLD: 0 /100 WBCS — SIGNIFICANT CHANGE UP
NRBC # FLD: 0 K/UL — SIGNIFICANT CHANGE UP
PHOSPHATE SERPL-MCNC: 4.3 MG/DL — SIGNIFICANT CHANGE UP (ref 2.5–4.5)
PLATELET # BLD AUTO: 227 K/UL — SIGNIFICANT CHANGE UP (ref 150–400)
POTASSIUM SERPL-MCNC: 4.1 MMOL/L — SIGNIFICANT CHANGE UP (ref 3.5–5.3)
POTASSIUM SERPL-SCNC: 4.1 MMOL/L — SIGNIFICANT CHANGE UP (ref 3.5–5.3)
PROT SERPL-MCNC: 5.7 G/DL — LOW (ref 6–8.3)
PROTHROM AB SERPL-ACNC: 13.3 SEC — SIGNIFICANT CHANGE UP (ref 10.5–13.4)
RBC # BLD: 4.6 M/UL — SIGNIFICANT CHANGE UP (ref 4.2–5.8)
RBC # FLD: 17 % — HIGH (ref 10.3–14.5)
RH IG SCN BLD-IMP: POSITIVE — SIGNIFICANT CHANGE UP
SODIUM SERPL-SCNC: 140 MMOL/L — SIGNIFICANT CHANGE UP (ref 135–145)
WBC # BLD: 4.76 K/UL — SIGNIFICANT CHANGE UP (ref 3.8–10.5)
WBC # FLD AUTO: 4.76 K/UL — SIGNIFICANT CHANGE UP (ref 3.8–10.5)

## 2022-05-09 PROCEDURE — 99233 SBSQ HOSP IP/OBS HIGH 50: CPT | Mod: GC

## 2022-05-09 PROCEDURE — 99233 SBSQ HOSP IP/OBS HIGH 50: CPT

## 2022-05-09 PROCEDURE — 93456 R HRT CORONARY ARTERY ANGIO: CPT | Mod: 26

## 2022-05-09 RX ORDER — FUROSEMIDE 40 MG
40 TABLET ORAL
Refills: 0 | Status: DISCONTINUED | OUTPATIENT
Start: 2022-05-09 | End: 2022-05-10

## 2022-05-09 RX ADMIN — Medication 40 MILLIGRAM(S): at 05:48

## 2022-05-09 RX ADMIN — PANTOPRAZOLE SODIUM 40 MILLIGRAM(S): 20 TABLET, DELAYED RELEASE ORAL at 05:47

## 2022-05-09 RX ADMIN — GABAPENTIN 100 MILLIGRAM(S): 400 CAPSULE ORAL at 22:21

## 2022-05-09 RX ADMIN — LISINOPRIL 20 MILLIGRAM(S): 2.5 TABLET ORAL at 05:47

## 2022-05-09 RX ADMIN — HEPARIN SODIUM 5000 UNIT(S): 5000 INJECTION INTRAVENOUS; SUBCUTANEOUS at 06:18

## 2022-05-09 RX ADMIN — HEPARIN SODIUM 5000 UNIT(S): 5000 INJECTION INTRAVENOUS; SUBCUTANEOUS at 17:50

## 2022-05-09 RX ADMIN — Medication 40 MILLIGRAM(S): at 17:51

## 2022-05-09 RX ADMIN — SIMVASTATIN 20 MILLIGRAM(S): 20 TABLET, FILM COATED ORAL at 22:21

## 2022-05-09 NOTE — PHYSICAL THERAPY INITIAL EVALUATION ADULT - GAIT DEVIATIONS NOTED, PT EVAL
decreased maxine/increased time in double stance/decreased velocity of limb motion/decreased step length/decreased weight-shifting ability

## 2022-05-09 NOTE — PROGRESS NOTE ADULT - ASSESSMENT
81 year old male with no pulmonary or cardiac history, chronic lower extremity edema, prostate cancer (Dx 2011) s/p radiation seeds, no recurrence, T2DM, HTN, Meniere's disease presenting with several months of SOB and lower extremity edema found to have severe pHTN.     Assessment: Volume overload and severe pHTN PASP 72 may be secondary to primary pulmonary process +/- left heart disease. His xray shows some interstitial markings that may be chronic. however CT chest reviewed essentially shows clear lungs no emphysema or evidence of underlying fibrosis. No consolidation to suggest PNA.     Plan:  Diuresis per primary team and cards  F/u RHC   Check daily weights, monitor strict ins and outs and record in [place]  Pulm will follow    This patient will need to follow up with the pulmonary team after discharge and will also need PFTs:  Please email: [email or web address] to setup an appointment prior to discharge with any available pulmonologist. The appointment should be within 1-2 weeks of discharge from the hospital. Include the patient's name, , MRN and contact information in the email.      Pulmonary/Sleep Clinic  [address] [place] [address]  [phone]    Please discuss the appointment details with the patient and include the appointment details in the patients discharge summary.  81 year old male with no pulmonary or cardiac history, chronic lower extremity edema, prostate cancer (Dx 2011) s/p radiation seeds, no recurrence, T2DM, HTN, Meniere's disease presenting with several months of SOB and lower extremity edema found to have severe pHTN.     Assessment: Volume overload and severe pHTN PASP 72 may be secondary to primary pulmonary process +/- left heart disease. His xray shows some interstitial markings that may be chronic. however CT chest reviewed essentially shows clear lungs no emphysema or evidence of underlying fibrosis. No consolidation to suggest PNA.     RHC . Waiting for full results.     Plan:  Diuresis per primary team and cards  F/u full RHC including CO in order to calculate PVR.    Check daily weights, monitor strict ins and outs and record in [place]  Pulm will follow    This patient will need to follow up with the pulmonary team after discharge and will also need PFTs:  Please email: [email or web address] to setup an appointment prior to discharge with any available pulmonologist. The appointment should be within 1-2 weeks of discharge from the hospital. Include the patient's name, , MRN and contact information in the email.      Pulmonary/Sleep Clinic  [address] [place] [address]  [phone]    Please discuss the appointment details with the patient and include the appointment details in the patients discharge summary.

## 2022-05-09 NOTE — PHYSICAL THERAPY INITIAL EVALUATION ADULT - GENERAL OBSERVATIONS, REHAB EVAL
Patient received in semifowler position in bed in [facility]. Patient denies chest pain, SOB, headache, and dizziness.

## 2022-05-09 NOTE — PROGRESS NOTE ADULT - ASSESSMENT
Edema  scrotal edema  lower ext edema   left more than right  agree with IV lasix   prelim CTPA shows no PE  fu official result     pulm HTN  pulm eval   will get left / right heart cath today     HTN  cont current meds     DM II  Monitor finger stick. Insulin coverage. Diabetic education and Diabetic diet. Consider nutrition consultation.    HLD  on statin      Advanced care planning was discussed with patient and family.  Risks, benefits and alternatives of the cardiac treatments and medical therapy including procedures were discussed in detail and all questions were answered. Importance of compliance with medical therapy and lifestyle modification to improve cardiovascular health were addressed. Appropriate forms and patient educational materials were reviewed. 30 minutes face to face spent.   Edema  scrotal edema  lower ext edema   left more than right  agree with IV lasix   CTPA shows no PE    pulm HTN  pulm eval   will get left / right heart cath today     HTN  cont current meds     DM II  Monitor finger stick. Insulin coverage. Diabetic education and Diabetic diet. Consider nutrition consultation.    HLD  on statin      Advanced care planning was discussed with patient and family.  Risks, benefits and alternatives of the cardiac treatments and medical therapy including procedures were discussed in detail and all questions were answered. Importance of compliance with medical therapy and lifestyle modification to improve cardiovascular health were addressed. Appropriate forms and patient educational materials were reviewed. 30 minutes face to face spent.

## 2022-05-09 NOTE — CHART NOTE - NSCHARTNOTEFT_GEN_A_CORE
Pre-cath hydration note    Patient presented to [facility] for RHC/LHC. Creatinine noted to be elevated(30/1.32) from prior labs values. GFR noted to be 55. Even though TTE revealed EF>55% will hold off on pre cath hydration as patient getting IV Lasix for CHF. Will evaluate EDP post procedure and decide on post hydration protocol.

## 2022-05-10 ENCOUNTER — TRANSCRIPTION ENCOUNTER (OUTPATIENT)
Age: 82
End: 2022-05-10

## 2022-05-10 VITALS
OXYGEN SATURATION: 99 % | RESPIRATION RATE: 19 BRPM | TEMPERATURE: 98 F | HEART RATE: 88 BPM | SYSTOLIC BLOOD PRESSURE: 117 MMHG | DIASTOLIC BLOOD PRESSURE: 69 MMHG

## 2022-05-10 LAB
ANION GAP SERPL CALC-SCNC: 10 MMOL/L — SIGNIFICANT CHANGE UP (ref 7–14)
BUN SERPL-MCNC: 25 MG/DL — HIGH (ref 7–23)
CALCIUM SERPL-MCNC: 9 MG/DL — SIGNIFICANT CHANGE UP (ref 8.4–10.5)
CHLORIDE SERPL-SCNC: 102 MMOL/L — SIGNIFICANT CHANGE UP (ref 98–107)
CO2 SERPL-SCNC: 28 MMOL/L — SIGNIFICANT CHANGE UP (ref 22–31)
CREAT SERPL-MCNC: 1.19 MG/DL — SIGNIFICANT CHANGE UP (ref 0.5–1.3)
EGFR: 62 ML/MIN/1.73M2 — SIGNIFICANT CHANGE UP
GLUCOSE BLDC GLUCOMTR-MCNC: 170 MG/DL — HIGH (ref 70–99)
GLUCOSE SERPL-MCNC: 114 MG/DL — HIGH (ref 70–99)
HCT VFR BLD CALC: 40.9 % — SIGNIFICANT CHANGE UP (ref 39–50)
HGB BLD-MCNC: 13.4 G/DL — SIGNIFICANT CHANGE UP (ref 13–17)
MAGNESIUM SERPL-MCNC: 2 MG/DL — SIGNIFICANT CHANGE UP (ref 1.6–2.6)
MCHC RBC-ENTMCNC: 27.5 PG — SIGNIFICANT CHANGE UP (ref 27–34)
MCHC RBC-ENTMCNC: 32.8 GM/DL — SIGNIFICANT CHANGE UP (ref 32–36)
MCV RBC AUTO: 84 FL — SIGNIFICANT CHANGE UP (ref 80–100)
NRBC # BLD: 0 /100 WBCS — SIGNIFICANT CHANGE UP
NRBC # FLD: 0 K/UL — SIGNIFICANT CHANGE UP
PHOSPHATE SERPL-MCNC: 4.1 MG/DL — SIGNIFICANT CHANGE UP (ref 2.5–4.5)
PLATELET # BLD AUTO: 245 K/UL — SIGNIFICANT CHANGE UP (ref 150–400)
POTASSIUM SERPL-MCNC: 4.6 MMOL/L — SIGNIFICANT CHANGE UP (ref 3.5–5.3)
POTASSIUM SERPL-SCNC: 4.6 MMOL/L — SIGNIFICANT CHANGE UP (ref 3.5–5.3)
RBC # BLD: 4.87 M/UL — SIGNIFICANT CHANGE UP (ref 4.2–5.8)
RBC # FLD: 16.6 % — HIGH (ref 10.3–14.5)
SODIUM SERPL-SCNC: 140 MMOL/L — SIGNIFICANT CHANGE UP (ref 135–145)
WBC # BLD: 5.58 K/UL — SIGNIFICANT CHANGE UP (ref 3.8–10.5)
WBC # FLD AUTO: 5.58 K/UL — SIGNIFICANT CHANGE UP (ref 3.8–10.5)

## 2022-05-10 PROCEDURE — 99233 SBSQ HOSP IP/OBS HIGH 50: CPT | Mod: GC

## 2022-05-10 PROCEDURE — 99239 HOSP IP/OBS DSCHRG MGMT >30: CPT

## 2022-05-10 RX ORDER — FUROSEMIDE 40 MG
1 TABLET ORAL
Qty: 60 | Refills: 0
Start: 2022-05-10 | End: 2022-06-08

## 2022-05-10 RX ORDER — FUROSEMIDE 40 MG
1 TABLET ORAL
Qty: 60 | Refills: 0 | DISCHARGE
Start: 2022-05-10 | End: 2022-06-08

## 2022-05-10 RX ADMIN — LISINOPRIL 20 MILLIGRAM(S): 2.5 TABLET ORAL at 05:56

## 2022-05-10 RX ADMIN — HEPARIN SODIUM 5000 UNIT(S): 5000 INJECTION INTRAVENOUS; SUBCUTANEOUS at 05:55

## 2022-05-10 RX ADMIN — Medication 2: at 12:54

## 2022-05-10 RX ADMIN — PANTOPRAZOLE SODIUM 40 MILLIGRAM(S): 20 TABLET, DELAYED RELEASE ORAL at 06:19

## 2022-05-10 RX ADMIN — Medication 40 MILLIGRAM(S): at 05:56

## 2022-05-10 NOTE — DISCHARGE NOTE PROVIDER - NSFOLLOWUPCLINICS_GEN_ALL_ED_FT
[facility]  Cardiology  [address] [place] [id number]  Phone: [phone]  Fax:

## 2022-05-10 NOTE — DISCHARGE NOTE PROVIDER - NSDCFUADDAPPT_GEN_ALL_CORE_FT
You have a pulmonology appointment scheduled with Dr. Gonzalez on May 19th at 10 am  Pulmonary/Sleep Clinic  [address] [place] [address]  [phone]      Please follow up with your PCP within 1-2 weeks of discharge

## 2022-05-10 NOTE — DISCHARGE NOTE PROVIDER - NSDCCPCAREPLAN_GEN_ALL_CORE_FT
PRINCIPAL DISCHARGE DIAGNOSIS  Diagnosis: Testicular swelling  Assessment and Plan of Treatment: You came to the hospital with scrotal swelling. You were not deemed to have an acute problem or infection, and we gave you a medication to help remove excess fluid. Please follow up with a urologist if this problem continues or worsens.      SECONDARY DISCHARGE DIAGNOSES  Diagnosis: Type 2 diabetes mellitus  Assessment and Plan of Treatment: Your HgA1C during hospitalization was noted to be 5.9% (Provide such information to your primary care).  Continue your medication regimen and a consistent carbohydrate diet (Meaning eating the same amount of carbohydrates at the same time each day). Monitor blood glucose levels throughout the day before meals and at bedtime. Record blood sugars and bring to outpatient providers appointment in order to be reviewed by your doctor for management modifications. If your sugars are more than 400 or less than 70 you should contact your PCP immediately.   Monitor for signs/symptoms of low blood glucose, such as, dizziness, altered mental status, or cool/clammy skin. In addition, monitor for signs/symptoms of high blood glucose, such as, feeling hot, dry, fatigued, or with increased thirst/urination.   Make regular podiatry appointments in order to have feet checked for wounds and uncontrolled toe nail growth to prevent infections, as well as, appointments with an ophthalmologist to monitor your vision.    Diagnosis: Elevated troponin  Assessment and Plan of Treatment: You had elevated troponin, which is a marker of heart injury; however, you were not deemed to have an acute problem with your heart. You also had a procedure done, which showed  pulmonary hypertension. Please continue lasix as prescribed Please notify a provider if you develop chest pain or shortness of breath, and follow up with your PCP & pulmonologist within 1-2 weeks    Diagnosis: Hypertension  Assessment and Plan of Treatment: Continue blood pressure medication regimen as directed. Monitor for any visual changes, headaches or dizziness.  Monitor blood pressure regularly.  Follow up with your primary care provider for further management for high blood pressure.    Diagnosis: Swelling of lower extremity  Assessment and Plan of Treatment: We gave you a medication called lasix to help remove exess fluid in your body. Please follow up with your PCP upon dishcagre for further care and notify a provider if this swelling cotinues or worsens     PRINCIPAL DISCHARGE DIAGNOSIS  Diagnosis: Testicular swelling  Assessment and Plan of Treatment: You came to the hospital with scrotal swelling. You were not deemed to have an acute problem or infection, and we gave you a medication to help remove excess fluid. Please follow up with a urologist if this problem continues or worsens.      SECONDARY DISCHARGE DIAGNOSES  Diagnosis: Type 2 diabetes mellitus  Assessment and Plan of Treatment: Your HgA1C during hospitalization was noted to be 5.9% (Provide such information to your primary care).  Continue your medication regimen and a consistent carbohydrate diet (Meaning eating the same amount of carbohydrates at the same time each day). Monitor blood glucose levels throughout the day before meals and at bedtime. Record blood sugars and bring to outpatient providers appointment in order to be reviewed by your doctor for management modifications. If your sugars are more than 400 or less than 70 you should contact your PCP immediately.   Monitor for signs/symptoms of low blood glucose, such as, dizziness, altered mental status, or cool/clammy skin. In addition, monitor for signs/symptoms of high blood glucose, such as, feeling hot, dry, fatigued, or with increased thirst/urination.   Make regular podiatry appointments in order to have feet checked for wounds and uncontrolled toe nail growth to prevent infections, as well as, appointments with an ophthalmologist to monitor your vision.    Diagnosis: Elevated troponin  Assessment and Plan of Treatment: You had elevated troponin, which is a marker of heart injury; however, you were not deemed to have an acute problem with your heart. You also had a procedure done, which showed  pulmonary hypertension. Please continue lasix as prescribed Please notify a provider if you develop chest pain or shortness of breath, and follow up with your PCP & pulmonologist within 1-2 weeks    Diagnosis: Hypertension  Assessment and Plan of Treatment: Continue blood pressure medication regimen as directed. Monitor for any visual changes, headaches or dizziness.  Monitor blood pressure regularly.  Follow up with your primary care provider for further management for high blood pressure.    Diagnosis: Swelling of lower extremity  Assessment and Plan of Treatment: We gave you a medication called lasix to help remove exess fluid in your body. Please follow up with your PCP upon dishcagre for further care and notify a provider if this swelling cotinues or worsens    Diagnosis: Pulmonary hypertension, unspecified  Assessment and Plan of Treatment: You also had a catheterization procedure done, which showed  pulmonary hypertension. Please continue lasix as prescribed Please notify a provider if you develop chest pain or shortness of breath, and follow up with your PCP & pulmonologist within 1-2 weeks

## 2022-05-10 NOTE — DISCHARGE NOTE NURSING/CASE MANAGEMENT/SOCIAL WORK - NSDCPEFALRISK_GEN_ALL_CORE
For information on Fall & Injury Prevention, visit: [email or web address] OR  [email or web address] OR  https://www.cdc.gov/steadi/patient.html

## 2022-05-10 NOTE — DISCHARGE NOTE PROVIDER - NSDCMRMEDTOKEN_GEN_ALL_CORE_FT
gabapentin 100 mg oral capsule: 1 tab(s) orally once a day (at bedtime)  lisinopril 20 mg oral tablet: 1 tab(s) orally once a day  meloxicam 15 mg oral tablet: 1 tab(s) orally once a day  metFORMIN 500 mg oral tablet: 1 tab(s) orally 2 times a day  omeprazole 20 mg oral delayed release capsule: 1 tab(s) orally once a day  simvastatin 20 mg oral tablet: 1 tab(s) orally once a day (at bedtime)   furosemide 40 mg oral tablet: 1 tab(s) orally 2 times a day  gabapentin 100 mg oral capsule: 1 tab(s) orally once a day (at bedtime)  lisinopril 20 mg oral tablet: 1 tab(s) orally once a day  meloxicam 15 mg oral tablet: 1 tab(s) orally once a day  metFORMIN 500 mg oral tablet: 1 tab(s) orally 2 times a day  omeprazole 20 mg oral delayed release capsule: 1 tab(s) orally once a day  simvastatin 20 mg oral tablet: 1 tab(s) orally once a day (at bedtime)

## 2022-05-10 NOTE — PROGRESS NOTE ADULT - PROBLEM SELECTOR PLAN 3
- likely demand  - work up per cards

## 2022-05-10 NOTE — DISCHARGE NOTE PROVIDER - NSDCFUSCHEDAPPT_GEN_ALL_CORE_FT
Diana Rose  [facility] Physician [address]  Scheduled Appointment: 05/12/2022    Aure Gonzalez  [address]  Scheduled Appointment: 05/19/2022    Jw Naranjo  [address]  Scheduled Appointment: 05/26/2022

## 2022-05-10 NOTE — DISCHARGE NOTE NURSING/CASE MANAGEMENT/SOCIAL WORK - PATIENT PORTAL LINK FT
You can access the FollowMyHealth Patient Portal offered by [facility] by registering at the following website: [email or web address]. By joining [facility]’s FollowMyHealth portal, you will also be able to view your health information using other applications (apps) compatible with our system.

## 2022-05-10 NOTE — PROGRESS NOTE ADULT - PROBLEM SELECTOR PLAN 4
- A1C 5.9  - hold metformin  - ISS  - monitor FS - controlled

## 2022-05-10 NOTE — PROGRESS NOTE ADULT - ASSESSMENT
Edema  scrotal edema  lower ext edema   much resolved with diuresis     pulm HTN  cath reviewed  no significant cad  right sided pressures much lower than echo and hemodyanmics consistent with due to left sided disease   cont lasix as ordered    HTN  cont current meds     Pt can follow up with me in 2 weeks in office     Skip Edwards MD, FACC  [address] [address]  [phone]

## 2022-05-10 NOTE — PROGRESS NOTE ADULT - ATTENDING COMMENTS
81 M with history as above here with increased LE edema and scrotal/testicular edema, found to have pulmonary hypertension and now s/p RHC. RHC does not show significant pulmonary hypertension as described above (mild).     - agree with diuresis as per cardiology  - concern for diastolic dysfunction given dilated LA, ?mild AS, LHC shows patent coronary arteries  - CT chest does not show any interstitial lung disease or any obvious parenchymal abnormalities.     No further intervention needed at this time. Can follow up with us as an outpatient for PFTs.     Thank you for your consult. Please call back if you have further questions regarding the care of this patient.
81 M with history as above here with increased LE edema and scrotal/testicular edema, found to have pulmonary hypertension.    Cxr suggestive of interstitial markings but patient not hypoxemic.  No known exposures, not smoker.    - agree with aggressive diuresis  - concern for diastolic dysfunction given dilated LA, ?mild AS (follow up LHC results)  - severe pulm htn - cont diuresis and follow up RHC results  - CT chest does not show any interstitial lung disease or any obvious parenchymal abnormalities.

## 2022-05-10 NOTE — DISCHARGE NOTE PROVIDER - HOSPITAL COURSE
81 year old M PMHx Prostate cancer (Dx 2011) s/p radiation seeds, no recurrence, T2DM, HTN, Meniere's disease presenting with complaint of testicular swelling x 4 days     Testicular swelling.    - pt with testicular swelling x 4 days  - US Testicle: No evidence of torsion. Scrotal wall thickening. Small bilateral hydroceles. Small bilateral varicoceles. Tubular hypoechoic focus is seen in the left epididymis may represent tubular ectasia of the epididymis.  - UA negative  - pt afebrile, no leukocytosis low suspicion for infection   - pt with elevated proBNP and LE edema likely d.t CHF.     Swelling of lower extremity.    - d.t acute diastolic failure   - pt with lower extremity edema left > right  - TTE sever PAH likely leading to R sided failure   - changed to po lasix renal fx better- I/O/s daily weight  - PASP 72  - duplex LE wo DVT   - CT-A w PE or sig lung dz - trace b/l effusions  - pulm f.u post cath- no further inpatient interventions    Elevated troponin.   - likely demand    Type 2 diabetes mellitus.   - A1C 5.9  - hold metformin  - ISS  - monitor FS - controlled.    Hypertension.    - monitor blood pressure  - pt is on Lisinopril 20mg QD - controlled   - DASH diet.    Patient is medically cleared for discharge to home with home PT and nursing services as discussed with Dr. Blandon on 5/10/22  Reviewed discharge medications with patient; All new medications requiring new prescription sent to pharmacy of patients choice. Reviewed need for prescription for previous home medicaitons and new prescriptions sent if requested. Patient in agreement and understands.          81 year old M PMHx Prostate cancer (Dx 2011) s/p radiation seeds, no recurrence, T2DM, HTN, Meniere's disease presenting with complaint of testicular swelling x 4 days     Testicular swelling.    - pt with testicular swelling x 4 days  - US Testicle: No evidence of torsion. Scrotal wall thickening. Small bilateral hydroceles. Small bilateral varicoceles. Tubular hypoechoic focus is seen in the left epididymis may represent tubular ectasia of the epididymis.  - UA negative  - pt afebrile, no leukocytosis low suspicion for infection   - pt with elevated proBNP and LE edema likely d.t CHF.     Swelling of lower extremity.    - d.t acute diastolic failure   - pt with lower extremity edema left > right  - TTE sever PAH likely leading to R sided failure   - changed to po lasix renal fx better- I/O/s daily weight  - PASP 72  - duplex LE wo DVT   - CT-A w PE or sig lung dz - trace b/l effusions  - pulm f.u post cath- no further inpatient interventions    Pulmonary HTN  - s/p RHC- pulm HTN  - pulomary f/u outpatient  - c/w lasix BID    Elevated troponin.   - likely demand    Type 2 diabetes mellitus.   - A1C 5.9  - hold metformin  - ISS  - monitor FS - controlled.    Hypertension.    - monitor blood pressure  - pt is on Lisinopril 20mg QD - controlled   - DASH diet.    Patient is medically cleared for discharge to home with home PT and nursing services as discussed with Dr. Blandon on 5/10/22  Reviewed discharge medications with patient; All new medications requiring new prescription sent to pharmacy of patients choice. Reviewed need for prescription for previous home medicaitons and new prescriptions sent if requested. Patient in agreement and understands.

## 2022-05-10 NOTE — PROGRESS NOTE ADULT - PROBLEM SELECTOR PROBLEM 2
Swelling of lower extremity

## 2022-05-10 NOTE — PROGRESS NOTE ADULT - SUBJECTIVE AND OBJECTIVE BOX
DATE OF SERVICE: 05-09-22 @ 09:27    Subjective: Patient seen and examined. No new events except as noted.     SUBJECTIVE/ROS:  feels ok       MEDICATIONS:  MEDICATIONS  (STANDING):  dextrose 5%. 1000 milliLiter(s) (100 mL/Hr) IV Continuous <Continuous>  dextrose 5%. 1000 milliLiter(s) (50 mL/Hr) IV Continuous <Continuous>  dextrose 50% Injectable 25 Gram(s) IV Push once  dextrose 50% Injectable 12.5 Gram(s) IV Push once  dextrose 50% Injectable 25 Gram(s) IV Push once  furosemide   Injectable 40 milliGRAM(s) IV Push two times a day  gabapentin 100 milliGRAM(s) Oral at bedtime  glucagon  Injectable 1 milliGRAM(s) IntraMuscular once  heparin   Injectable 5000 Unit(s) SubCutaneous every 12 hours  insulin lispro (ADMELOG) corrective regimen sliding scale   SubCutaneous three times a day before meals  insulin lispro (ADMELOG) corrective regimen sliding scale   SubCutaneous at bedtime  lisinopril 20 milliGRAM(s) Oral daily  pantoprazole    Tablet 40 milliGRAM(s) Oral before breakfast  simvastatin 20 milliGRAM(s) Oral at bedtime      PHYSICAL EXAM:  T(C): 36.6 (05-09-22 @ 05:40), Max: 36.8 (05-08-22 @ 21:10)  HR: 86 (05-09-22 @ 05:40) (81 - 95)  BP: 115/63 (05-09-22 @ 05:40) (102/65 - 140/91)  RR: 18 (05-09-22 @ 05:40) (18 - 18)  SpO2: 100% (05-09-22 @ 05:40) (100% - 100%)  Wt(kg): --  I&O's Summary          JVP: Normal  Neck: supple  Lung: clear   CV: S1 S2 , Murmur:  Abd: soft  Ext: No edema  neuro: Awake / alert  Psych: flat affect  Skin: normal``    LABS/DATA:    CARDIAC MARKERS:                                12.8   4.76  )-----------( 227      ( 09 May 2022 07:15 )             38.4     05-09    140  |  101  |  30<H>  ----------------------------<  101<H>  4.1   |  28  |  1.32<H>    Ca    8.8      09 May 2022 07:15  Phos  4.3     05-09  Mg     2.00     05-09    TPro  5.7<L>  /  Alb  3.2<L>  /  TBili  0.4  /  DBili  x   /  AST  24  /  ALT  36  /  AlkPhos  76  05-09    proBNP:   Lipid Profile:   HgA1c:   TSH:     TELE:  EKG:        
Patient is a 81y old  Male who presents with a chief complaint of testicular swelling (08 May 2022 09:03)      SUBJECTIVE / OVERNIGHT EVENTS: no events, edema is slowly improving, denies CP or SOB    ROS:  No HA/DZ  No Vision changes   No CP, SOB  No N/V/D  No Rash  NO weakness, numbness    MEDICATIONS  (STANDING):  dextrose 5%. 1000 milliLiter(s) (100 mL/Hr) IV Continuous <Continuous>  dextrose 5%. 1000 milliLiter(s) (50 mL/Hr) IV Continuous <Continuous>  dextrose 50% Injectable 25 Gram(s) IV Push once  dextrose 50% Injectable 12.5 Gram(s) IV Push once  dextrose 50% Injectable 25 Gram(s) IV Push once  furosemide   Injectable 40 milliGRAM(s) IV Push two times a day  gabapentin 100 milliGRAM(s) Oral at bedtime  glucagon  Injectable 1 milliGRAM(s) IntraMuscular once  heparin   Injectable 5000 Unit(s) SubCutaneous every 12 hours  insulin lispro (ADMELOG) corrective regimen sliding scale   SubCutaneous three times a day before meals  insulin lispro (ADMELOG) corrective regimen sliding scale   SubCutaneous at bedtime  lisinopril 20 milliGRAM(s) Oral daily  pantoprazole    Tablet 40 milliGRAM(s) Oral before breakfast  simvastatin 20 milliGRAM(s) Oral at bedtime  sodium zirconium cyclosilicate 5 Gram(s) Oral once    MEDICATIONS  (PRN):  dextrose Oral Gel 15 Gram(s) Oral once PRN Blood Glucose LESS THAN 70 milliGRAM(s)/deciliter      T(C): 36.7 (05-08-22 @ 05:17)  HR: 95 (05-08-22 @ 05:17)  BP: 139/87 (05-08-22 @ 05:17)  RR: 16 (05-08-22 @ 05:17)  SpO2: 100% (05-08-22 @ 05:17)  CAPILLARY BLOOD GLUCOSE      POCT Blood Glucose.: 116 mg/dL (08 May 2022 08:26)  POCT Blood Glucose.: 123 mg/dL (07 May 2022 21:54)  POCT Blood Glucose.: 98 mg/dL (07 May 2022 16:53)  POCT Blood Glucose.: 121 mg/dL (07 May 2022 11:41)    I&O's Summary    07 May 2022 07:01  -  08 May 2022 07:00  --------------------------------------------------------  IN: 0 mL / OUT: 3000 mL / NET: -3000 mL        PHYSICAL EXAM:  GENERAL: NAD, well-developed, AOx3  HEAD:  Atraumatic, Normocephalic  EYES: EOMI, PERRL, conjunctiva and sclera clear  NECK: Supple, No JVD  CHEST/LUNG: Clear to auscultation bilaterally  HEART: Regular rate and rhythm; No murmurs, rubs, or gallops, + 2 Edema  ABDOMEN: Soft, Nontender, Nondistended; Bowel sounds present  EXTREMITIES:  2+ Peripheral Pulses, No clubbing, cyanosis  PSYCH: No SI/HI  NEUROLOGY: non-focal  SKIN: No rashes or lesions    LABS:                        13.7   4.53  )-----------( 251      ( 08 May 2022 07:18 )             41.2     05-08    140  |  101  |  23  ----------------------------<  113<H>  5.4<H>   |  28  |  1.16    Ca    9.5      08 May 2022 07:18  Mg     1.90     05-07    TPro  6.4  /  Alb  3.8  /  TBili  0.8  /  DBili  x   /  AST  30  /  ALT  46<H>  /  AlkPhos  83  05-08                  RADIOLOGY & ADDITIONAL TESTS:    Imaging Personally Reviewed:    Consultant(s) Notes Reviewed:      Care Discussed with Consultants/Other Providers:  
Patient is a 81y old  Male who presents with a chief complaint of testicular swelling (09 May 2022 09:27)      SUBJECTIVE / OVERNIGHT EVENTS:    Patient seen and examined at bedside. No acute events overnight.    T(F): 97.9 (05-09 @ 05:40), Max: 98.2 (05-08 @ 21:10)  HR: 86 (05-09 @ 05:40) (81 - 95)  BP: 115/63 (05-09 @ 05:40) (102/65 - 140/91)  RR: 18 (05-09 @ 05:40) (18 - 18)  SpO2: 100% (05-09 @ 05:40) (100% - 100%)    I&O's Summary      MEDICATIONS  (STANDING):  dextrose 5%. 1000 milliLiter(s) (100 mL/Hr) IV Continuous <Continuous>  dextrose 5%. 1000 milliLiter(s) (50 mL/Hr) IV Continuous <Continuous>  dextrose 50% Injectable 25 Gram(s) IV Push once  dextrose 50% Injectable 12.5 Gram(s) IV Push once  dextrose 50% Injectable 25 Gram(s) IV Push once  furosemide   Injectable 40 milliGRAM(s) IV Push two times a day  gabapentin 100 milliGRAM(s) Oral at bedtime  glucagon  Injectable 1 milliGRAM(s) IntraMuscular once  heparin   Injectable 5000 Unit(s) SubCutaneous every 12 hours  insulin lispro (ADMELOG) corrective regimen sliding scale   SubCutaneous three times a day before meals  insulin lispro (ADMELOG) corrective regimen sliding scale   SubCutaneous at bedtime  lisinopril 20 milliGRAM(s) Oral daily  pantoprazole    Tablet 40 milliGRAM(s) Oral before breakfast  simvastatin 20 milliGRAM(s) Oral at bedtime    MEDICATIONS  (PRN):  dextrose Oral Gel 15 Gram(s) Oral once PRN Blood Glucose LESS THAN 70 milliGRAM(s)/deciliter      Constitutional: denies fevers, chills, night sweats, weight loss  HEENT: denies visual changes, cough  Cardiovascular: denies palpitations, chest pain, edema  Respiratory: denies SOB, wheezing  Gastrointestinal: denies N/V/D, abdominal pain, hematochezia, melena  : denies dysuria, urinary urgency, increased frequency  MSK: denies muscle weakness, joint pain  Skin: denies new rashes or masses  Heme: denies bleeding, bruising  Neuro: denies headache, weakness    PHYSICAL EXAM:   GEN: Age appropriate, resting comfortably in bed, no acute distress, non toxic appearing, speaking in complete sentences.   HEENT: Conjunctiva and sclera normal  PULM: Lungs CTAB, no wheezes, rales, rhonchi  CV: RRR, S1S2, no MRG  MSK: no stiffness or joint effusions  Abdominal: Soft, nontender to palpation, non-distended, +BS  Extremities: No edema or cyanosis  NEURO: AAOx3  Psych: normal affect, normal behavior  Skin: No rashes, lesions    LABS:  Labs personally reviewed.                        12.8   4.76  )-----------( 227      ( 09 May 2022 07:15 )             38.4     Hgb Trend: 12.8<--, 13.7<--, 12.3<--, 13.0<--, 11.9<--  05-09    140  |  101  |  30<H>  ----------------------------<  101<H>  4.1   |  28  |  1.32<H>    Ca    8.8      09 May 2022 07:15  Phos  4.3     05-09  Mg     2.00     05-09    TPro  5.7<L>  /  Alb  3.2<L>  /  TBili  0.4  /  DBili  x   /  AST  24  /  ALT  36  /  AlkPhos  76  05-09    Creatinine Trend: 1.32<--, 1.09<--, 1.16<--, 1.05<--, 1.07<--, 1.11<--  PT/INR - ( 09 May 2022 07:15 )   PT: 13.3 sec;   INR: 1.14 ratio         PTT - ( 09 May 2022 07:15 )  PTT:28.5 sec        [facility]  Pulmonary and Critical Care Fellow    PGY-5 Pager: [phone]  [facility] Pulmonary Spectra [phone]   Night Float: 
Patient is a 81y old  Male who presents with a chief complaint of testicular swelling (09 May 2022 10:54)      SUBJECTIVE / OVERNIGHT EVENTS:    Patient seen and examined at bedside. No acute events overnight.    T(F): 98 (05-10 @ 05:46), Max: 98.5 (05-09 @ 22:11)  HR: 87 (05-10 @ 05:46) (63 - 89)  BP: 116/66 (05-10 @ 05:46) (100/66 - 129/76)  RR: 18 (05-10 @ 05:46) (17 - 18)  SpO2: 100% (05-10 @ 05:46) (100% - 100%)    I&O's Summary      MEDICATIONS  (STANDING):  dextrose 5%. 1000 milliLiter(s) (100 mL/Hr) IV Continuous <Continuous>  dextrose 5%. 1000 milliLiter(s) (50 mL/Hr) IV Continuous <Continuous>  dextrose 50% Injectable 25 Gram(s) IV Push once  dextrose 50% Injectable 12.5 Gram(s) IV Push once  dextrose 50% Injectable 25 Gram(s) IV Push once  furosemide    Tablet 40 milliGRAM(s) Oral two times a day  gabapentin 100 milliGRAM(s) Oral at bedtime  glucagon  Injectable 1 milliGRAM(s) IntraMuscular once  heparin   Injectable 5000 Unit(s) SubCutaneous every 12 hours  insulin lispro (ADMELOG) corrective regimen sliding scale   SubCutaneous three times a day before meals  insulin lispro (ADMELOG) corrective regimen sliding scale   SubCutaneous at bedtime  lisinopril 20 milliGRAM(s) Oral daily  pantoprazole    Tablet 40 milliGRAM(s) Oral before breakfast  simvastatin 20 milliGRAM(s) Oral at bedtime    MEDICATIONS  (PRN):  dextrose Oral Gel 15 Gram(s) Oral once PRN Blood Glucose LESS THAN 70 milliGRAM(s)/deciliter      Constitutional: denies fevers, chills, night sweats, weight loss  HEENT: denies visual changes, cough  Cardiovascular: denies palpitations, chest pain, edema  Respiratory: denies SOB, wheezing  Gastrointestinal: denies N/V/D, abdominal pain, hematochezia, melena  : denies dysuria, urinary urgency, increased frequency  MSK: denies muscle weakness, joint pain  Skin: denies new rashes or masses  Heme: denies bleeding, bruising  Neuro: denies headache, weakness    PHYSICAL EXAM:   GEN: Age appropriate, resting comfortably in bed, no acute distress, non toxic appearing, speaking in complete sentences.   HEENT: Conjunctiva and sclera normal  PULM: Lungs CTAB, no wheezes, rales, rhonchi  CV: RRR, S1S2, no MRG  MSK: no stiffness or joint effusions  Abdominal: Soft, nontender to palpation, non-distended, +BS  Extremities: bilat LE edema however improved.   NEURO: AAOx3  Psych: normal affect, normal behavior  Skin: No rashes, lesions    LABS:  Labs personally reviewed.                        13.4   5.58  )-----------( 245      ( 10 May 2022 05:54 )             40.9     Hgb Trend: 13.4<--, 12.8<--, 13.7<--, 12.3<--, 13.0<--  05-10    140  |  102  |  25<H>  ----------------------------<  114<H>  4.6   |  28  |  1.19    Ca    9.0      10 May 2022 05:54  Phos  4.1     05-10  Mg     2.00     05-10    TPro  5.7<L>  /  Alb  3.2<L>  /  TBili  0.4  /  DBili  x   /  AST  24  /  ALT  36  /  AlkPhos  76  05-09    Creatinine Trend: 1.19<--, 1.32<--, 1.09<--, 1.16<--, 1.05<--, 1.07<--  PT/INR - ( 09 May 2022 07:15 )   PT: 13.3 sec;   INR: 1.14 ratio         PTT - ( 09 May 2022 07:15 )  PTT:28.5 sec        [facility]  Pulmonary and Critical Care Fellow    PGY-5 Pager: [phone]  [email or web address]  [facility] Pulmonary Spectra [phone]   Night Float: 
DATE OF SERVICE: 05-07-22 @ 09:22    Subjective: Patient seen and examined. No new events except as noted.     SUBJECTIVE/ROS:  feels ok       MEDICATIONS:  MEDICATIONS  (STANDING):  dextrose 5%. 1000 milliLiter(s) (100 mL/Hr) IV Continuous <Continuous>  dextrose 5%. 1000 milliLiter(s) (50 mL/Hr) IV Continuous <Continuous>  dextrose 50% Injectable 25 Gram(s) IV Push once  dextrose 50% Injectable 12.5 Gram(s) IV Push once  dextrose 50% Injectable 25 Gram(s) IV Push once  furosemide   Injectable 40 milliGRAM(s) IV Push two times a day  gabapentin 100 milliGRAM(s) Oral at bedtime  glucagon  Injectable 1 milliGRAM(s) IntraMuscular once  heparin   Injectable 5000 Unit(s) SubCutaneous every 12 hours  insulin lispro (ADMELOG) corrective regimen sliding scale   SubCutaneous three times a day before meals  insulin lispro (ADMELOG) corrective regimen sliding scale   SubCutaneous at bedtime  lisinopril 20 milliGRAM(s) Oral daily  pantoprazole    Tablet 40 milliGRAM(s) Oral before breakfast  simvastatin 20 milliGRAM(s) Oral at bedtime      PHYSICAL EXAM:  T(C): 36.3 (05-07-22 @ 05:20), Max: 36.8 (05-06-22 @ 21:10)  HR: 84 (05-07-22 @ 05:20) (82 - 94)  BP: 117/64 (05-07-22 @ 05:20) (117/60 - 124/85)  RR: 20 (05-07-22 @ 05:20) (18 - 20)  SpO2: 100% (05-07-22 @ 05:20) (98% - 100%)  Wt(kg): --  I&O's Summary    06 May 2022 07:01  -  07 May 2022 07:00  --------------------------------------------------------  IN: 1994 mL / OUT: 4250 mL / NET: -2256 mL            JVP: Normal  Neck: supple  Lung: clear   CV: S1 S2 , Murmur:  Abd: soft  Ext: pos edema  neuro: Awake / alert  Psych: flat affect  Skin: normal``    LABS/DATA:    CARDIAC MARKERS:                                12.3   5.13  )-----------( 228      ( 07 May 2022 06:52 )             37.5     05-07    140  |  104  |  24<H>  ----------------------------<  115<H>  3.5   |  25  |  1.05    Ca    8.7      07 May 2022 06:52  Phos  3.9     05-06  Mg     1.90     05-06    TPro  5.7<L>  /  Alb  3.3  /  TBili  0.7  /  DBili  x   /  AST  33  /  ALT  50<H>  /  AlkPhos  74  05-07    proBNP:   Lipid Profile:   HgA1c:   TSH:     TELE:  EKG:        
DATE OF SERVICE: 05-08-22 @ 09:04    Subjective: Patient seen and examined. No new events except as noted.     SUBJECTIVE/ROS:  feels better  good urine output       MEDICATIONS:  MEDICATIONS  (STANDING):  dextrose 5%. 1000 milliLiter(s) (100 mL/Hr) IV Continuous <Continuous>  dextrose 5%. 1000 milliLiter(s) (50 mL/Hr) IV Continuous <Continuous>  dextrose 50% Injectable 25 Gram(s) IV Push once  dextrose 50% Injectable 12.5 Gram(s) IV Push once  dextrose 50% Injectable 25 Gram(s) IV Push once  furosemide   Injectable 40 milliGRAM(s) IV Push two times a day  gabapentin 100 milliGRAM(s) Oral at bedtime  glucagon  Injectable 1 milliGRAM(s) IntraMuscular once  heparin   Injectable 5000 Unit(s) SubCutaneous every 12 hours  insulin lispro (ADMELOG) corrective regimen sliding scale   SubCutaneous three times a day before meals  insulin lispro (ADMELOG) corrective regimen sliding scale   SubCutaneous at bedtime  lisinopril 20 milliGRAM(s) Oral daily  pantoprazole    Tablet 40 milliGRAM(s) Oral before breakfast  simvastatin 20 milliGRAM(s) Oral at bedtime      PHYSICAL EXAM:  T(C): 36.7 (05-08-22 @ 05:17), Max: 36.9 (05-07-22 @ 17:58)  HR: 95 (05-08-22 @ 05:17) (81 - 95)  BP: 139/87 (05-08-22 @ 05:17) (118/69 - 142/80)  RR: 16 (05-08-22 @ 05:17) (16 - 16)  SpO2: 100% (05-08-22 @ 05:17) (98% - 100%)  Wt(kg): --  I&O's Summary    07 May 2022 07:01  -  08 May 2022 07:00  --------------------------------------------------------  IN: 0 mL / OUT: 3000 mL / NET: -3000 mL            JVP: Normal  Neck: supple  Lung: clear   CV: S1 S2 , Murmur:  Abd: soft  Ext: pos edema  neuro: Awake / alert  Psych: flat affect  Skin: normal``    LABS/DATA:    CARDIAC MARKERS:                                13.7   4.53  )-----------( 251      ( 08 May 2022 07:18 )             41.2     05-08    140  |  101  |  23  ----------------------------<  113<H>  5.4<H>   |  28  |  1.16    Ca    9.5      08 May 2022 07:18  Mg     1.90     05-07    TPro  6.4  /  Alb  3.8  /  TBili  0.8  /  DBili  x   /  AST  30  /  ALT  46<H>  /  AlkPhos  83  05-08    proBNP:   Lipid Profile:   HgA1c:   TSH:     TELE:  EKG:        
DATE OF SERVICE: 05-10-22 @ 08:17    Subjective: Patient seen and examined. No new events except as noted.     SUBJECTIVE/ROS:  feels well       MEDICATIONS:  MEDICATIONS  (STANDING):  dextrose 5%. 1000 milliLiter(s) (100 mL/Hr) IV Continuous <Continuous>  dextrose 5%. 1000 milliLiter(s) (50 mL/Hr) IV Continuous <Continuous>  dextrose 50% Injectable 25 Gram(s) IV Push once  dextrose 50% Injectable 12.5 Gram(s) IV Push once  dextrose 50% Injectable 25 Gram(s) IV Push once  furosemide    Tablet 40 milliGRAM(s) Oral two times a day  gabapentin 100 milliGRAM(s) Oral at bedtime  glucagon  Injectable 1 milliGRAM(s) IntraMuscular once  heparin   Injectable 5000 Unit(s) SubCutaneous every 12 hours  insulin lispro (ADMELOG) corrective regimen sliding scale   SubCutaneous three times a day before meals  insulin lispro (ADMELOG) corrective regimen sliding scale   SubCutaneous at bedtime  lisinopril 20 milliGRAM(s) Oral daily  pantoprazole    Tablet 40 milliGRAM(s) Oral before breakfast  simvastatin 20 milliGRAM(s) Oral at bedtime      PHYSICAL EXAM:  T(C): 36.7 (05-10-22 @ 05:46), Max: 36.9 (05-09-22 @ 22:11)  HR: 87 (05-10-22 @ 05:46) (63 - 89)  BP: 116/66 (05-10-22 @ 05:46) (100/66 - 129/76)  RR: 18 (05-10-22 @ 05:46) (17 - 18)  SpO2: 100% (05-10-22 @ 05:46) (100% - 100%)  Wt(kg): --  I&O's Summary      Weight (kg): 68.946 (05-09 @ 11:46)      JVP: Normal  Neck: supple  Lung: clear   CV: S1 S2 , Murmur:  Abd: soft  Ext: No edema  neuro: Awake / alert  Psych: flat affect  Skin: normal``    LABS/DATA:    CARDIAC MARKERS:                                13.4   5.58  )-----------( 245      ( 10 May 2022 05:54 )             40.9     05-10    140  |  102  |  25<H>  ----------------------------<  114<H>  4.6   |  28  |  1.19    Ca    9.0      10 May 2022 05:54  Phos  4.1     05-10  Mg     2.00     05-10    TPro  5.7<L>  /  Alb  3.2<L>  /  TBili  0.4  /  DBili  x   /  AST  24  /  ALT  36  /  AlkPhos  76  05-09    proBNP:   Lipid Profile:   HgA1c:   TSH:     TELE:  EKG:        
Patient is a 81y old  Male who presents with a chief complaint of testicular swelling (10 May 2022 08:16)      SUBJECTIVE / OVERNIGHT EVENTS: no events - feels well     ROS:  No HA/DZ  No Vision changes   No CP, SOB  No N/V/D  No Edema  No Rash  NO weakness, numbness    MEDICATIONS  (STANDING):  dextrose 5%. 1000 milliLiter(s) (100 mL/Hr) IV Continuous <Continuous>  dextrose 5%. 1000 milliLiter(s) (50 mL/Hr) IV Continuous <Continuous>  dextrose 50% Injectable 25 Gram(s) IV Push once  dextrose 50% Injectable 12.5 Gram(s) IV Push once  dextrose 50% Injectable 25 Gram(s) IV Push once  furosemide    Tablet 40 milliGRAM(s) Oral two times a day  gabapentin 100 milliGRAM(s) Oral at bedtime  glucagon  Injectable 1 milliGRAM(s) IntraMuscular once  heparin   Injectable 5000 Unit(s) SubCutaneous every 12 hours  insulin lispro (ADMELOG) corrective regimen sliding scale   SubCutaneous three times a day before meals  insulin lispro (ADMELOG) corrective regimen sliding scale   SubCutaneous at bedtime  lisinopril 20 milliGRAM(s) Oral daily  pantoprazole    Tablet 40 milliGRAM(s) Oral before breakfast  simvastatin 20 milliGRAM(s) Oral at bedtime    MEDICATIONS  (PRN):  dextrose Oral Gel 15 Gram(s) Oral once PRN Blood Glucose LESS THAN 70 milliGRAM(s)/deciliter      T(C): 36.7 (05-10-22 @ 05:46)  HR: 87 (05-10-22 @ 05:46)  BP: 116/66 (05-10-22 @ 05:46)  RR: 18 (05-10-22 @ 05:46)  SpO2: 100% (05-10-22 @ 05:46)  CAPILLARY BLOOD GLUCOSE  114 (10 May 2022 07:54)      POCT Blood Glucose.: 154 mg/dL (09 May 2022 21:06)  POCT Blood Glucose.: 92 mg/dL (09 May 2022 17:52)  POCT Blood Glucose.: 86 mg/dL (09 May 2022 17:25)  POCT Blood Glucose.: 98 mg/dL (09 May 2022 13:53)    I&O's Summary      PHYSICAL EXAM:  GENERAL: NAD, well-developed, AOx3  HEAD:  Atraumatic, Normocephalic  EYES: EOMI, PERRL, conjunctiva and sclera clear  NECK: Supple, No JVD  CHEST/LUNG: Clear to auscultation bilaterally  HEART: Regular rate and rhythm; No murmurs, rubs, or gallops, No Edema  ABDOMEN: Soft, Nontender, Nondistended; Bowel sounds present  EXTREMITIES:  2+ Peripheral Pulses, No clubbing, cyanosis  PSYCH: No SI/HI  NEUROLOGY: non-focal  SKIN: No rashes or lesions    LABS:                        13.4   5.58  )-----------( 245      ( 10 May 2022 05:54 )             40.9     05-10    140  |  102  |  25<H>  ----------------------------<  114<H>  4.6   |  28  |  1.19    Ca    9.0      10 May 2022 05:54  Phos  4.1     05-10  Mg     2.00     05-10    TPro  5.7<L>  /  Alb  3.2<L>  /  TBili  0.4  /  DBili  x   /  AST  24  /  ALT  36  /  AlkPhos  76  05-09    PT/INR - ( 09 May 2022 07:15 )   PT: 13.3 sec;   INR: 1.14 ratio         PTT - ( 09 May 2022 07:15 )  PTT:28.5 sec              RADIOLOGY & ADDITIONAL TESTS:    Imaging Personally Reviewed:    Consultant(s) Notes Reviewed:      Care Discussed with Consultants/Other Providers:  
Patient is a 81y old  Male who presents with a chief complaint of testicular swelling (06 May 2022 17:10)      SUBJECTIVE / OVERNIGHT EVENTS: no events, swelling a bit improved, denies CP or SOB, SATs ok on RA     ROS:  No HA/DZ  No Vision changes   No CP, SOB  No N/V/D  No Rash  NO weakness, numbness    MEDICATIONS  (STANDING):  dextrose 5%. 1000 milliLiter(s) (100 mL/Hr) IV Continuous <Continuous>  dextrose 5%. 1000 milliLiter(s) (50 mL/Hr) IV Continuous <Continuous>  dextrose 50% Injectable 25 Gram(s) IV Push once  dextrose 50% Injectable 12.5 Gram(s) IV Push once  dextrose 50% Injectable 25 Gram(s) IV Push once  furosemide   Injectable 40 milliGRAM(s) IV Push two times a day  gabapentin 100 milliGRAM(s) Oral at bedtime  glucagon  Injectable 1 milliGRAM(s) IntraMuscular once  heparin   Injectable 5000 Unit(s) SubCutaneous every 12 hours  insulin lispro (ADMELOG) corrective regimen sliding scale   SubCutaneous three times a day before meals  insulin lispro (ADMELOG) corrective regimen sliding scale   SubCutaneous at bedtime  lisinopril 20 milliGRAM(s) Oral daily  pantoprazole    Tablet 40 milliGRAM(s) Oral before breakfast  simvastatin 20 milliGRAM(s) Oral at bedtime    MEDICATIONS  (PRN):  dextrose Oral Gel 15 Gram(s) Oral once PRN Blood Glucose LESS THAN 70 milliGRAM(s)/deciliter      T(C): 36.3 (22 @ 05:20)  HR: 84 (22 @ 05:20)  BP: 117/64 (22 @ 05:20)  RR: 20 (22 @ 05:20)  SpO2: 100% (22 @ 05:20)  CAPILLARY BLOOD GLUCOSE      POCT Blood Glucose.: 139 mg/dL (07 May 2022 08:06)  POCT Blood Glucose.: 101 mg/dL (06 May 2022 22:05)  POCT Blood Glucose.: 85 mg/dL (06 May 2022 17:03)  POCT Blood Glucose.: 151 mg/dL (06 May 2022 11:51)    I&O's Summary    06 May 2022 07:01  -  07 May 2022 07:00  --------------------------------------------------------  IN:  mL / OUT: 4250 mL / NET: -2256 mL        PHYSICAL EXAM:  GENERAL: NAD, well-developed, AOx3  HEAD:  Atraumatic, Normocephalic  EYES: EOMI, PERRL, conjunctiva and sclera clear  NECK: Supple, No JVD  CHEST/LUNG: Clear to auscultation bilaterally  HEART: Regular rate and rhythm; No murmurs, rubs, or gallops, + Edema  ABDOMEN: Soft, Nontender, Nondistended; Bowel sounds present  EXTREMITIES:  2+ Peripheral Pulses, No clubbing, cyanosis  PSYCH: No SI/HI  NEUROLOGY: non-focal  SKIN: No rashes or lesions    LABS:                        12.3   5.13  )-----------( 228      ( 07 May 2022 06:52 )             37.5     05-07    140  |  104  |  24<H>  ----------------------------<  115<H>  3.5   |  25  |  1.05    Ca    8.7      07 May 2022 06:52  Phos  3.9     05-06  Mg     1.90     05-06    TPro  5.7<L>  /  Alb  3.3  /  TBili  0.7  /  DBili  x   /  AST  33  /  ALT  50<H>  /  AlkPhos  74  05-07    PT/INR - ( 06 May 2022 06:57 )   PT: 13.7 sec;   INR: 1.18 ratio         PTT - ( 06 May 2022 06:57 )  PTT:29.1 sec      Urinalysis Basic - ( 05 May 2022 18:40 )    Color: Lacie / Appearance: Clear / S.029 / pH: x  Gluc: x / Ketone: Negative  / Bili: Negative / Urobili: <2 mg/dL   Blood: x / Protein: 100 mg/dL / Nitrite: Negative   Leuk Esterase: Negative / RBC: 2 /HPF / WBC 1 /HPF   Sq Epi: x / Non Sq Epi: 1 /HPF / Bacteria: Negative            RADIOLOGY & ADDITIONAL TESTS:    Imaging Personally Reviewed:    Consultant(s) Notes Reviewed:      Care Discussed with Consultants/Other Providers:  
Patient is a 81y old  Male who presents with a chief complaint of testicular swelling (09 May 2022 10:06)      SUBJECTIVE / OVERNIGHT EVENTS: feels better, SOB improved, Edema better     ROS:  No HA/DZ  No Vision changes   No CP, SOB  No N/V/D  No Rash    MEDICATIONS  (STANDING):  dextrose 5%. 1000 milliLiter(s) (100 mL/Hr) IV Continuous <Continuous>  dextrose 5%. 1000 milliLiter(s) (50 mL/Hr) IV Continuous <Continuous>  dextrose 50% Injectable 25 Gram(s) IV Push once  dextrose 50% Injectable 12.5 Gram(s) IV Push once  dextrose 50% Injectable 25 Gram(s) IV Push once  furosemide   Injectable 40 milliGRAM(s) IV Push two times a day  gabapentin 100 milliGRAM(s) Oral at bedtime  glucagon  Injectable 1 milliGRAM(s) IntraMuscular once  heparin   Injectable 5000 Unit(s) SubCutaneous every 12 hours  insulin lispro (ADMELOG) corrective regimen sliding scale   SubCutaneous three times a day before meals  insulin lispro (ADMELOG) corrective regimen sliding scale   SubCutaneous at bedtime  lisinopril 20 milliGRAM(s) Oral daily  pantoprazole    Tablet 40 milliGRAM(s) Oral before breakfast  simvastatin 20 milliGRAM(s) Oral at bedtime    MEDICATIONS  (PRN):  dextrose Oral Gel 15 Gram(s) Oral once PRN Blood Glucose LESS THAN 70 milliGRAM(s)/deciliter      T(C): 36.6 (05-09-22 @ 05:40)  HR: 86 (05-09-22 @ 05:40)  BP: 115/63 (05-09-22 @ 05:40)  RR: 18 (05-09-22 @ 05:40)  SpO2: 100% (05-09-22 @ 05:40)  CAPILLARY BLOOD GLUCOSE      POCT Blood Glucose.: 103 mg/dL (09 May 2022 08:43)  POCT Blood Glucose.: 122 mg/dL (08 May 2022 22:08)  POCT Blood Glucose.: 100 mg/dL (08 May 2022 12:16)    I&O's Summary      PHYSICAL EXAM:  GENERAL: NAD, well-developed, AOx3  HEAD:  Atraumatic, Normocephalic  EYES: EOMI, PERRL, conjunctiva and sclera clear  NECK: Supple, No JVD  CHEST/LUNG: Clear to auscultation bilaterally  HEART: Regular rate and rhythm; No murmurs, rubs, or gallops, improving Edema  ABDOMEN: Soft, Nontender, Nondistended; Bowel sounds present  EXTREMITIES:  2+ Peripheral Pulses, No clubbing, cyanosis  PSYCH: No SI/HI  NEUROLOGY: non-focal  SKIN: No rashes or lesions    LABS:                        12.8   4.76  )-----------( 227      ( 09 May 2022 07:15 )             38.4     05-09    140  |  101  |  30<H>  ----------------------------<  101<H>  4.1   |  28  |  1.32<H>    Ca    8.8      09 May 2022 07:15  Phos  4.3     05-09  Mg     2.00     05-09    TPro  5.7<L>  /  Alb  3.2<L>  /  TBili  0.4  /  DBili  x   /  AST  24  /  ALT  36  /  AlkPhos  76  05-09    PT/INR - ( 09 May 2022 07:15 )   PT: 13.3 sec;   INR: 1.14 ratio         PTT - ( 09 May 2022 07:15 )  PTT:28.5 sec              RADIOLOGY & ADDITIONAL TESTS:    Imaging Personally Reviewed:    Consultant(s) Notes Reviewed:      Care Discussed with Consultants/Other Providers:

## 2022-05-10 NOTE — PROGRESS NOTE ADULT - PROBLEM SELECTOR PLAN 1
- pt with testicular swelling x 4 days  - US Testicle: No evidence of torsion. Scrotal wall thickening. Small bilateral hydroceles. Small bilateral varicoceles. Tubular hypoechoic focus is seen in the left epididymis may represent tubular ectasia of the epididymis.  - UA negative  - pt afebrile, no leukocytosis low suspicion for infection   - pt with elevated proBNP and LE edema dinesh MONTES
- pt with testicular swelling x 4 days  - US Testicle: No evidence of torsion. Scrotal wall thickening. Small bilateral hydroceles. Small bilateral varicoceles. Tubular hypoechoic focus is seen in the left epididymis may represent tubular ectasia of the epididymis.  - UA negative  - pt afebrile, no leukocytosis low suspicion for infection   - pt with elevated proBNP and LE edema likely d.t CHF
- pt with testicular swelling x 4 days  - US Testicle: No evidence of torsion. Scrotal wall thickening. Small bilateral hydroceles. Small bilateral varicoceles. Tubular hypoechoic focus is seen in the left epididymis may represent tubular ectasia of the epididymis.  - UA negative  - pt afebrile, no leukocytosis low suspicion for infection   - pt with elevated proBNP and LE edema dinesh MONTES
- pt with testicular swelling x 4 days  - US Testicle: No evidence of torsion. Scrotal wall thickening. Small bilateral hydroceles. Small bilateral varicoceles. Tubular hypoechoic focus is seen in the left epididymis may represent tubular ectasia of the epididymis.  - UA negative  - pt afebrile, no leukocytosis low suspicion for infection   - pt with elevated proBNP and LE edema likely d.t CHF

## 2022-05-10 NOTE — DISCHARGE NOTE PROVIDER - NSDCFUADDINST_GEN_ALL_CORE_FT
Please monitor your right wrist for signs of bleeding, swelling or pain. Please refrain from lifting anything greater than 10 lbs for 1 week after your procedure

## 2022-05-10 NOTE — PROGRESS NOTE ADULT - PROBLEM SELECTOR PLAN 5
- monitor blood pressure  - pt is on Lisinopril 20mg QD - controlled   - DASH diet

## 2022-05-10 NOTE — DISCHARGE NOTE NURSING/CASE MANAGEMENT/SOCIAL WORK - NSDCFUADDAPPT_GEN_ALL_CORE_FT
You have a pulmonology appointment scheduled with Dr. Gonzalez on May 19th at 10 am  Pulmonary/Sleep Clinic  [address] [place] [address]  [phone]      Please follow up with your PCP within 1-2 weeks of discharge

## 2022-05-10 NOTE — PROGRESS NOTE ADULT - PROBLEM SELECTOR PLAN 2
- d.t acute diastolic failure   - pt with lower extremity edema left > right  - TTE sever PAH likely leading to R sided failure   - change to po lasix today as w bump in renal fx and volume status improved - I/O/s daily weight  - dw cards attending - plan for R and L cath today   - duplex LE wo DVT   - CT-A w PE or sig lung dz - trace b/l effusions  - pulm f.u post cath
- d.t acute diastolic failure   - pt with lower extremity edema left > right  - TTE sever PAH likely leading to R sided failure   - changed to po lasix renal fx better- I/O/s daily weight  - PASP 72  - duplex LE wo DVT   - CT-A w PE or sig lung dz - trace b/l effusions  - pulm f.u post cath
- pt with lower extremity edema left > right  - TTE sever PAH likely leading to R sided failure   - aggressive diuresis, I/O/s daily weight  - dw cards attending this AM, plan for R and L cath amanda   - brendon pulm and cards f.u   - duplex LE wo DVT   - CT-A w PE or sig lung dz - trace b/l effusions
- pt with lower extremity edema left > right  - TTE sever PAH likely leading to R sided failure   - aggressive diuresis, I/O/s daily weight  - likely will need RHC  - brendon pulm and cards f.u   - duplex LE r/o DVT considering asymmetry in edema  - CT-A to evaluate lung parenchyma and r.o PE

## 2022-05-10 NOTE — PROGRESS NOTE ADULT - PROBLEM SELECTOR PLAN 6
DVT ppx Heparin SQ BID  mild hyperkalemia - lokelma x1 - will consider reducing or holding Ace-i if remains elevated - on tele
DVT ppx Heparin SQ BID  hyperkalemia - resolved  Dispo - dc planning for today   PT eval - Home PT
DVT ppx Heparin SQ BID  hyperkalemia - resolved  Dispo - p cath and pulm f.u  PT eval
DVT ppx Heparin SQ BID

## 2022-05-10 NOTE — PROGRESS NOTE ADULT - ASSESSMENT
81 year old male with no pulmonary or cardiac history, chronic lower extremity edema, prostate cancer (Dx 2011) s/p radiation seeds, no recurrence, T2DM, HTN, Meniere's disease presenting with several months of SOB and lower extremity edema found to have severe pHTN.     Assessment: Volume overload and severe pHTN PASP 72 may be secondary to primary pulmonary process +/- left heart disease. His xray shows some interstitial markings that may be chronic. however CT chest reviewed essentially shows clear lungs no emphysema or evidence of underlying fibrosis. No consolidation to suggest PNA.     RHC : PA 43/11/24, PCWP 16, CO 4.17, PVR 1.92. PVR, transpulmonary pressure gradient, and diastolic pressure gradient do not suggest a significant component of pre capillary pulmonary hypertension at the time of the this RHC.     Plan:  Diuresis per primary team and cards  Check daily weights, monitor strict ins and outs and record in [place]  No further pulmonary intervention / work up indicated at this time. Patient on room air, CT chest clear lungs.   Thank you for the consult please call with questions.       This patient will need to follow up with the pulmonary team after discharge and will also need PFTs:  Please email: [email or web address] to setup an appointment prior to discharge with any available pulmonologist. The appointment should be within 1-2 weeks of discharge from the hospital. Include the patient's name, , MRN and contact information in the email.      Pulmonary/Sleep Clinic  [address] [place] [address]  [phone]    Please discuss the appointment details with the patient and include the appointment details in the patients discharge summary.

## 2022-05-10 NOTE — PROGRESS NOTE ADULT - TIME BILLING
Medical management as above, review of results/records, discussion with patient and primary team.
Medical management as above, review of results/records, discussion with patient and primary team.

## 2022-05-10 NOTE — PROGRESS NOTE ADULT - REASON FOR ADMISSION
testicular swelling

## 2022-05-10 NOTE — PROGRESS NOTE ADULT - PROVIDER SPECIALTY LIST ADULT
Cardiology
Pulmonology
Cardiology
Pulmonology
Hospitalist

## 2022-05-10 NOTE — DISCHARGE NOTE PROVIDER - CARE PROVIDER_API CALL
Aure Gonzalez (MD)  Critical Care Medicine; Internal Medicine; Pulmonary Disease  [facility] - Dept of Medicine, [address] [place] [address]  Phone: [phone]  Fax: [phone]  Follow Up Time:

## 2022-05-12 ENCOUNTER — APPOINTMENT (OUTPATIENT)
Dept: PULMONOLOGY | Facility: CLINIC | Age: 82
End: 2022-05-12

## 2022-05-17 ENCOUNTER — NON-APPOINTMENT (OUTPATIENT)
Age: 82
End: 2022-05-17

## 2022-05-17 ENCOUNTER — APPOINTMENT (OUTPATIENT)
Dept: CARDIOLOGY | Facility: CLINIC | Age: 82
End: 2022-05-17
Payer: MEDICARE

## 2022-05-17 VITALS
SYSTOLIC BLOOD PRESSURE: 97 MMHG | OXYGEN SATURATION: 100 % | HEART RATE: 90 BPM | DIASTOLIC BLOOD PRESSURE: 63 MMHG | HEIGHT: 64.5 IN

## 2022-05-17 DIAGNOSIS — I50.31 ACUTE DIASTOLIC (CONGESTIVE) HEART FAILURE: ICD-10-CM

## 2022-05-17 DIAGNOSIS — N50.89 OTHER SPECIFIED DISORDERS OF THE MALE GENITAL ORGANS: ICD-10-CM

## 2022-05-17 DIAGNOSIS — R77.8 OTHER SPECIFIED ABNORMALITIES OF PLASMA PROTEINS: ICD-10-CM

## 2022-05-17 DIAGNOSIS — K40.90 UNILATERAL INGUINAL HERNIA, W/OUT OBSTRUCTION OR GANGRENE, NOT SPECIFIED AS RECURRENT: ICD-10-CM

## 2022-05-17 DIAGNOSIS — I50.810 RIGHT HEART FAILURE, UNSPECIFIED: ICD-10-CM

## 2022-05-17 PROCEDURE — 93000 ELECTROCARDIOGRAM COMPLETE: CPT

## 2022-05-17 PROCEDURE — 99204 OFFICE O/P NEW MOD 45 MIN: CPT

## 2022-05-17 RX ORDER — PREDNISOLONE ACETATE 10 MG/ML
SUSPENSION/ DROPS OPHTHALMIC
Refills: 0 | Status: ACTIVE | COMMUNITY

## 2022-05-17 RX ORDER — GABAPENTIN 100 MG/1
100 CAPSULE ORAL AT BEDTIME
Refills: 0 | Status: ACTIVE | COMMUNITY

## 2022-05-17 RX ORDER — METFORMIN HYDROCHLORIDE 500 MG/1
500 TABLET, COATED ORAL TWICE DAILY
Qty: 60 | Refills: 2 | Status: ACTIVE | COMMUNITY

## 2022-05-17 RX ORDER — SIMVASTATIN 20 MG/1
20 TABLET, FILM COATED ORAL DAILY
Qty: 90 | Refills: 1 | Status: ACTIVE | COMMUNITY

## 2022-05-17 NOTE — ASSESSMENT
[FreeTextEntry1] : Impression:\par \par Mr. Jurado is an 81 year old  gentleman who presented with lower extremity edema. His electrocardiogram has low voltage in the limb leads, despite the myocardial thickening seen on echocardiogram. The mitral inflow pattern is restrictive. 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

## 2022-05-17 NOTE — REASON FOR VISIT
[FreeTextEntry1] : New patient, here with a daughter\par \par Mr. Jurado is a pleasant 81 year old man who was recently discharged from [facility]. He had been experiencing progressive edema for approximately one year, and presented with lower extremity and scrotal swelling. He was treated with furosemide and lisinopril, and referred for follow-up.\par \par I personally reviewed the images from the echocardiogram performed on 5/06/2022. Regrettably, I believe that the interpretation missed what appears to me to be cardiac amyloidosis.\par \par Since having been discharged on the above regimen, much of the edema (not all) has resolved. According to his daughter, he is "walking better". He does not experience any nocturnal dyspnea.\par \par The patient does his best to take care of his wife, who suffers from multiple sclerosis.

## 2022-05-17 NOTE — REVIEW OF SYSTEMS
[Feeling Fatigued] : feeling fatigued [Weight Loss (___ Lbs)] : [unfilled] ~Ulb weight loss [Dyspnea on exertion] : dyspnea during exertion [Lower Ext Edema] : lower extremity edema [Negative] : Psychiatric [Palpitations] : no palpitations [Orthopnea] : no orthopnea [PND] : no PND [Syncope] : no syncope [Abdominal Pain] : no abdominal pain [Joint Pain] : no joint pain [Myalgia] : no myalgia [FreeTextEntry5] : See HPI

## 2022-05-17 NOTE — PHYSICAL EXAM
[Normal Conjunctiva] : normal conjunctiva [No Carotid Bruit] : no carotid bruit [Normal S1, S2] : normal S1, S2 [No Murmur] : no murmur [Clear Lung Fields] : clear lung fields [Good Air Entry] : good air entry [No Respiratory Distress] : no respiratory distress  [Soft] : abdomen soft [Edema ___] : edema [unfilled] [Moves all extremities] : moves all extremities [Normal] : alert and oriented, normal memory [de-identified] : Frail appearing

## 2022-05-19 ENCOUNTER — APPOINTMENT (OUTPATIENT)
Dept: PULMONOLOGY | Facility: CLINIC | Age: 82
End: 2022-05-19
Payer: MEDICARE

## 2022-05-19 VITALS
HEIGHT: 64.5 IN | OXYGEN SATURATION: 97 % | RESPIRATION RATE: 15 BRPM | SYSTOLIC BLOOD PRESSURE: 106 MMHG | BODY MASS INDEX: 20.49 KG/M2 | DIASTOLIC BLOOD PRESSURE: 66 MMHG | TEMPERATURE: 97.6 F | HEART RATE: 101 BPM | WEIGHT: 121.5 LBS

## 2022-05-19 DIAGNOSIS — Z82.49 FAMILY HISTORY OF ISCHEMIC HEART DISEASE AND OTHER DISEASES OF THE CIRCULATORY SYSTEM: ICD-10-CM

## 2022-05-19 DIAGNOSIS — Z81.8 FAMILY HISTORY OF OTHER MENTAL AND BEHAVIORAL DISORDERS: ICD-10-CM

## 2022-05-19 DIAGNOSIS — H81.09 MENIERE'S DISEASE, UNSPECIFIED EAR: ICD-10-CM

## 2022-05-19 DIAGNOSIS — I27.21 SECONDARY PULMONARY ARTERIAL HYPERTENSION: ICD-10-CM

## 2022-05-19 DIAGNOSIS — Z92.3 PERSONAL HISTORY OF IRRADIATION: ICD-10-CM

## 2022-05-19 DIAGNOSIS — Z85.46 PERSONAL HISTORY OF MALIGNANT NEOPLASM OF PROSTATE: ICD-10-CM

## 2022-05-19 DIAGNOSIS — Z86.79 PERSONAL HISTORY OF OTHER DISEASES OF THE CIRCULATORY SYSTEM: ICD-10-CM

## 2022-05-19 DIAGNOSIS — Z86.69 PERSONAL HISTORY OF OTHER DISEASES OF THE NERVOUS SYSTEM AND SENSE ORGANS: ICD-10-CM

## 2022-05-19 DIAGNOSIS — Z86.39 PERSONAL HISTORY OF OTHER ENDOCRINE, NUTRITIONAL AND METABOLIC DISEASE: ICD-10-CM

## 2022-05-19 DIAGNOSIS — Z80.1 FAMILY HISTORY OF MALIGNANT NEOPLASM OF TRACHEA, BRONCHUS AND LUNG: ICD-10-CM

## 2022-05-19 DIAGNOSIS — S82.131A DISPLACED FRACTURE OF MEDIAL CONDYLE OF RIGHT TIBIA, INITIAL ENCOUNTER FOR CLOSED FRACTURE: ICD-10-CM

## 2022-05-19 PROCEDURE — 99213 OFFICE O/P EST LOW 20 MIN: CPT

## 2022-05-19 PROCEDURE — 99203 OFFICE O/P NEW LOW 30 MIN: CPT

## 2022-05-19 NOTE — REVIEW OF SYSTEMS
[Fever] : no fever [Fatigue] : no fatigue [Recent Wt Gain (___ Lbs)] : ~T no recent weight gain [Chills] : no chills [Recent Wt Loss (___ Lbs)] : ~T no recent weight loss [Cough] : no cough [Chest Tightness] : no chest tightness [Sputum] : no sputum [Dyspnea] : no dyspnea [Pleuritic Pain] : no pleuritic pain [Wheezing] : no wheezing [SOB on Exertion] : no sob on exertion [Chest Discomfort] : no chest discomfort [Leg Cramps] : no leg cramps [Palpitations] : no palpitations [Fracture] : fracture [Trauma/ Injury] : trauma/ injury [Diabetes] : diabetes [Negative] : Hematologic [TextBox_44] : mild distal LE edema [TextBox_83] : no further testicular swelling [TextBox_91] : left knee fracture (while gardening)

## 2022-05-19 NOTE — PHYSICAL EXAM
[No Acute Distress] : no acute distress [Well Nourished] : well nourished [Well Groomed] : well groomed [No Deformities] : no deformities [Well Developed] : well developed [Normal Oropharynx] : normal oropharynx [Normal Appearance] : normal appearance [Supple] : supple [Thyroid Not Enlarged] : thyroid not enlarged [No Neck Mass] : no neck mass [No JVD] : no jvd [Normal Rate/Rhythm] : normal rate/rhythm [Normal S1, S2] : normal s1, s2 [No Murmurs] : no murmurs [No Rubs] : no rubs [No Gallops] : no gallops [No Resp Distress] : no resp distress [No Acc Muscle Use] : no acc muscle use [Normal Rhythm and Effort] : normal rhythm and effort [Clear to Auscultation Bilaterally] : clear to auscultation bilaterally [Scoliosis] : scoliosis [Benign] : benign [No Clubbing] : no clubbing [No Cyanosis] : no cyanosis [No Ischemic Changes] : no ischemic changes [1+ Pitting] : 1+ pitting [No Rash] : no rash [Normal Turgor] : normal turgor [Oriented x3] : oriented x3 [Normal Mood] : normal mood [Normal Affect] : normal affect [TextBox_99] : walks with cane due to left knee injury

## 2022-05-19 NOTE — REASON FOR VISIT
[Follow-Up - From Hospitalization] : a follow-up visit after a recent hospitalization [Shortness of Breath] : shortness of breath [Family Member] : family member [TextBox_13] : Dakota Segovia MD

## 2022-05-19 NOTE — ASSESSMENT
[FreeTextEntry1] : Mr. Jurado is an 82 yo black M with PMHx HTN, HLD, DM, prostace ca, Meniere's disease recently discharged from [facility] for acute diastolic HF exacerbation.  His outpatient Cardiologist Dr. Quesada has concerns for cardiac amyloidosis and has commenced workup for such.  From a pulmonary perspective patient no longer has any respiratory complaints s/p diuresis which demonstrated significant improvement in his PASP between echo to RHC. On CTA there is no evidence of pulmonary parenchymal abnormalities.  For completeness purpose he will need to complete full PFTs.  His active issues seem to be largely cardiac in etiology.\par \par Plan:\par - Full PFTs, will call with results once completed\par - NM scan per Cardiology for suspected amyloidosis\par - Continue lasix and lisinopril for BP control; monitor daily weights\par \par Follow-up PRN with pulmonary.\par \par Aure Gonzalez MD\par Pulmonary/Critical Care Attending

## 2022-05-19 NOTE — HISTORY OF PRESENT ILLNESS
[Never] : never [Group II - Pulmonary Venous HTN] : Group II - Pulmonary Venous HTN [TextBox_4] : Mr. Jurado is a pleasant 80 yo M originally from [place] with PMhx HTN, HLD, DM, Meniere's disease, prostace ca (s/p radiation seeds) presenting for follow-up from recent hospitalization at [facility] from 5/5-5/10/22 for c/o progressive testicular/lower extremity swelling and shortness of breath.  On echo found to have elevated PASP of 72mmHg with RV failure - was subsequently diuresed and RHC/LHC performed on 5/9/22 showed significant improvement with PA 43/11/24, PCWP 16, CO 4.17, PVR 1.92 consistent with mild Pulm HTN WHO group II.  While inpatient, patient was evaluated by our pulmonary team to rule out concomitant pulmonary causes of his PH and SOB.  CTA performed showed no e/o PE (LE dopplers were also negative), mild interstitial markings (presumably in setting of resolving pulm edema) and cardiomegaly.  Pulmonary parenchyma was otherwise unrevealing for any underlying lung issue.  He was referred for follow-up with Cardiology, seen by Dr. Quesada on 5/17/22 who has concerns for cardiac amyloidosis based on his review of echo findings.  He was referred here to completed workup with full PFTs however these still have yet to be performed.\par At today's visit patient has no respiratory complaints.  He denies fever, chills, night sweats, cough.  He is able to perform his ADLs without limitation from resp standpoint.  He is currently limited in his ambulation due to a recent knee injury but prior to his hospitalization was never limited due to SOB.  He has no prior history of pulmonary disease.  He is a never smoker.\par \par Social Hx: Worked as  for 30+ yrs.

## 2022-05-26 ENCOUNTER — APPOINTMENT (OUTPATIENT)
Dept: PULMONOLOGY | Facility: CLINIC | Age: 82
End: 2022-05-26

## 2022-06-07 ENCOUNTER — APPOINTMENT (OUTPATIENT)
Dept: NUCLEAR MEDICINE | Facility: IMAGING CENTER | Age: 82
End: 2022-06-07

## 2022-06-11 ENCOUNTER — NON-APPOINTMENT (OUTPATIENT)
Age: 82
End: 2022-06-11

## 2022-06-17 ENCOUNTER — APPOINTMENT (OUTPATIENT)
Dept: PULMONOLOGY | Facility: CLINIC | Age: 82
End: 2022-06-17
Payer: MEDICARE

## 2022-06-17 VITALS
DIASTOLIC BLOOD PRESSURE: 60 MMHG | WEIGHT: 122 LBS | OXYGEN SATURATION: 100 % | TEMPERATURE: 98.9 F | HEART RATE: 96 BPM | SYSTOLIC BLOOD PRESSURE: 118 MMHG | BODY MASS INDEX: 21.62 KG/M2 | HEIGHT: 63 IN

## 2022-06-17 PROCEDURE — 94729 DIFFUSING CAPACITY: CPT

## 2022-06-17 PROCEDURE — 94010 BREATHING CAPACITY TEST: CPT

## 2022-06-17 PROCEDURE — 94726 PLETHYSMOGRAPHY LUNG VOLUMES: CPT

## 2022-06-20 ENCOUNTER — APPOINTMENT (OUTPATIENT)
Dept: NUCLEAR MEDICINE | Facility: IMAGING CENTER | Age: 82
End: 2022-06-20

## 2022-06-21 ENCOUNTER — OUTPATIENT (OUTPATIENT)
Dept: OUTPATIENT SERVICES | Facility: HOSPITAL | Age: 82
LOS: 1 days | End: 2022-06-21
Payer: COMMERCIAL

## 2022-06-21 DIAGNOSIS — I51.7 CARDIOMEGALY: ICD-10-CM

## 2022-06-21 PROCEDURE — 78830 RP LOCLZJ TUM SPECT W/CT 1: CPT

## 2022-06-21 PROCEDURE — A9538: CPT

## 2022-06-21 PROCEDURE — 78830 RP LOCLZJ TUM SPECT W/CT 1: CPT | Mod: 26

## 2022-06-28 ENCOUNTER — NON-APPOINTMENT (OUTPATIENT)
Age: 82
End: 2022-06-28

## 2022-06-28 ENCOUNTER — APPOINTMENT (OUTPATIENT)
Age: 82
End: 2022-06-28

## 2022-06-28 ENCOUNTER — APPOINTMENT (OUTPATIENT)
Dept: PULMONOLOGY | Facility: CLINIC | Age: 82
End: 2022-06-28

## 2022-06-28 PROCEDURE — 99441: CPT

## 2022-06-28 PROCEDURE — ZZZZZ: CPT

## 2022-07-11 ENCOUNTER — APPOINTMENT (OUTPATIENT)
Dept: NUCLEAR MEDICINE | Facility: HOSPITAL | Age: 82
End: 2022-07-11

## 2022-07-13 ENCOUNTER — NON-APPOINTMENT (OUTPATIENT)
Age: 82
End: 2022-07-13

## 2022-07-13 ENCOUNTER — APPOINTMENT (OUTPATIENT)
Dept: CARDIOLOGY | Facility: CLINIC | Age: 82
End: 2022-07-13

## 2022-07-13 ENCOUNTER — LABORATORY RESULT (OUTPATIENT)
Age: 82
End: 2022-07-13

## 2022-07-13 VITALS
DIASTOLIC BLOOD PRESSURE: 56 MMHG | BODY MASS INDEX: 21.26 KG/M2 | WEIGHT: 120 LBS | HEART RATE: 98 BPM | OXYGEN SATURATION: 100 % | SYSTOLIC BLOOD PRESSURE: 110 MMHG

## 2022-07-13 PROCEDURE — 93000 ELECTROCARDIOGRAM COMPLETE: CPT

## 2022-07-13 PROCEDURE — 99215 OFFICE O/P EST HI 40 MIN: CPT | Mod: 25

## 2022-07-15 LAB
ALBUMIN SERPL ELPH-MCNC: 3.8 G/DL
ALP BLD-CCNC: 88 U/L
ALT SERPL-CCNC: 29 U/L
ANION GAP SERPL CALC-SCNC: 11 MMOL/L
AST SERPL-CCNC: 29 U/L
BASOPHILS # BLD AUTO: 0.04 K/UL
BASOPHILS NFR BLD AUTO: 0.7 %
BILIRUB SERPL-MCNC: 0.3 MG/DL
BUN SERPL-MCNC: 22 MG/DL
CALCIUM SERPL-MCNC: 9 MG/DL
CHLORIDE SERPL-SCNC: 108 MMOL/L
CO2 SERPL-SCNC: 24 MMOL/L
CREAT SERPL-MCNC: 1.06 MG/DL
DEPRECATED KAPPA LC FREE/LAMBDA SER: 1.59 RATIO
EGFR: 71 ML/MIN/1.73M2
EOSINOPHIL # BLD AUTO: 0.34 K/UL
EOSINOPHIL NFR BLD AUTO: 6 %
GLUCOSE SERPL-MCNC: 101 MG/DL
HCT VFR BLD CALC: 31 %
HGB BLD-MCNC: 10 G/DL
IMM GRANULOCYTES NFR BLD AUTO: 0.2 %
KAPPA LC CSF-MCNC: 3 MG/DL
KAPPA LC SERPL-MCNC: 4.77 MG/DL
LYMPHOCYTES # BLD AUTO: 1.24 K/UL
LYMPHOCYTES NFR BLD AUTO: 21.9 %
MAN DIFF?: NORMAL
MCHC RBC-ENTMCNC: 26.7 PG
MCHC RBC-ENTMCNC: 32.3 GM/DL
MCV RBC AUTO: 82.7 FL
MONOCYTES # BLD AUTO: 0.52 K/UL
MONOCYTES NFR BLD AUTO: 9.2 %
NEUTROPHILS # BLD AUTO: 3.52 K/UL
NEUTROPHILS NFR BLD AUTO: 62 %
NT-PROBNP SERPL-MCNC: 3964 PG/ML
PLATELET # BLD AUTO: 336 K/UL
POTASSIUM SERPL-SCNC: 5.7 MMOL/L
PREALB SERPL NEPH-MCNC: 7 MG/DL
PROT SERPL-MCNC: 6.4 G/DL
RBC # BLD: 3.75 M/UL
RBC # FLD: 16.8 %
SODIUM SERPL-SCNC: 143 MMOL/L
TSH SERPL-ACNC: 2.7 UIU/ML
WBC # FLD AUTO: 5.67 K/UL

## 2022-07-19 LAB
IGA 24H UR QL IFE: NORMAL
VIT A SERPL-MCNC: 18.4 UG/DL

## 2022-07-20 LAB — M PROTEIN SPEC IFE-MCNC: NORMAL

## 2022-07-23 NOTE — HISTORY OF PRESENT ILLNESS
[FreeTextEntry1] : Patient is an 81 year-old Black gentleman with known past medical history of noninsulin dependent type II diabetes, prostate ca, who has been experiencing bilateral lower extremity edema for more than a year, recently admitted to [facility], followed-up as outpatient with Leo Quesada MD, and was noted to have evidence of cardiac amyloidosis. 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 \par \par Patient's 41 year-old son was recently diagnosed with hereditary TTR amyloidosis. \par \par Patient has not been sick with Covid-19. He has received two shots and a booster, all from [employer].\par

## 2022-07-23 NOTE — CARDIOLOGY SUMMARY
[de-identified] : 5/17/2022, NSR with 1st degree AVB, LAE. Low voltage in the limb leads. [de-identified] : 5/6/2022, septum 1.3 cm, PWT 1.2 cm, dilated LA, mild concentric LVH, severe pulmonary hypertension, LVEF 57% [de-identified] : 6/21/2022, technetium pyrophosphate scan, grade 3, ratio 1.9 [de-identified] : 5/9/2022 with Mahamed Nguyen MD at [facility] - patent coronary arteries, mild pulmonary hypertension

## 2022-07-23 NOTE — DISCUSSION/SUMMARY
[EKG obtained to assist in diagnosis and management of assessed problem(s)] : EKG obtained to assist in diagnosis and management of assessed problem(s) [FreeTextEntry1] : Patient is an 81 year-old black gentleman with history as above who is referred now for evaluation and management of cardiac amyloidosis. \par Light chain studies to exclude AL-CA. 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 wildtype TTR amyloidosis. \par \par Start TTR stabilizer therapy with Vyndamax.\par If patient is noted to have hereditary TTR amyloidosis with polyneuropathy, consider silencer therapy.\par

## 2022-08-11 ENCOUNTER — APPOINTMENT (OUTPATIENT)
Dept: CARDIOLOGY | Facility: CLINIC | Age: 82
End: 2022-08-11

## 2022-08-11 ENCOUNTER — LABORATORY RESULT (OUTPATIENT)
Age: 82
End: 2022-08-11

## 2022-08-11 ENCOUNTER — NON-APPOINTMENT (OUTPATIENT)
Age: 82
End: 2022-08-11

## 2022-08-11 VITALS
HEART RATE: 89 BPM | DIASTOLIC BLOOD PRESSURE: 58 MMHG | OXYGEN SATURATION: 100 % | BODY MASS INDEX: 22.32 KG/M2 | WEIGHT: 126 LBS | SYSTOLIC BLOOD PRESSURE: 96 MMHG

## 2022-08-11 PROCEDURE — 36415 COLL VENOUS BLD VENIPUNCTURE: CPT

## 2022-08-11 PROCEDURE — 99215 OFFICE O/P EST HI 40 MIN: CPT | Mod: 25

## 2022-08-11 PROCEDURE — 93000 ELECTROCARDIOGRAM COMPLETE: CPT

## 2022-08-11 NOTE — DISCUSSION/SUMMARY
[EKG obtained to assist in diagnosis and management of assessed problem(s)] : EKG obtained to assist in diagnosis and management of assessed problem(s) [FreeTextEntry1] : Patient is an 81 year-old black gentleman with history as above who is referred now for evaluation and management of cardiac amyloidosis. \par Light chain studies to exclude AL-CA. 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 wildtype TTR amyloidosis - will send at next visit.\par \par Continue TTR stabilizer therapy with Vyndamax.\par If patient is noted to have hereditary TTR amyloidosis with polyneuropathy, consider silencer therapy.\par

## 2022-08-11 NOTE — CARDIOLOGY SUMMARY
[de-identified] : 5/17/2022, NSR with 1st degree AVB, LAE. Low voltage in the limb leads. [de-identified] : 5/6/2022, septum 1.3 cm, PWT 1.2 cm, dilated LA, mild concentric LVH, severe pulmonary hypertension, LVEF 57% [de-identified] : 6/21/2022, technetium pyrophosphate scan, grade 3, ratio 1.9 [de-identified] : 5/9/2022 with Mahamed Nguyen MD at [facility] - patent coronary arteries, mild pulmonary hypertension

## 2022-08-11 NOTE — REASON FOR VISIT
[Other: ____] : [unfilled] [Other: _____] : [unfilled] [FreeTextEntry1] : August 2022 - Patient returns today for follow-up. He started Vyndamax 61 mg daily approximately two weeks ago. He does not notice any change in his condition. He has run out of his loop diuretics, and so he has not been on any diuretic recently.

## 2022-08-11 NOTE — HISTORY OF PRESENT ILLNESS
[FreeTextEntry1] : Patient is an 81 year-old Black gentleman with known past medical history of noninsulin dependent type II diabetes, prostate ca, who has been experiencing bilateral lower extremity edema for more than a year, recently admitted to [facility], followed-up as outpatient with Leo Quesada MD, and was noted to have evidence of cardiac amyloidosis. 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 \par \par Patient's 41 year-old son was recently diagnosed with hereditary TTR amyloidosis. \par \par Patient has not been sick with Covid-19. He has received two shots and a booster, all from [email or web address].\par

## 2022-08-12 ENCOUNTER — APPOINTMENT (OUTPATIENT)
Dept: VASCULAR SURGERY | Facility: CLINIC | Age: 82
End: 2022-08-12

## 2022-08-12 VITALS
HEART RATE: 31 BPM | WEIGHT: 126 LBS | SYSTOLIC BLOOD PRESSURE: 100 MMHG | OXYGEN SATURATION: 96 % | BODY MASS INDEX: 22.32 KG/M2 | DIASTOLIC BLOOD PRESSURE: 62 MMHG | HEIGHT: 63 IN

## 2022-08-12 PROCEDURE — 99214 OFFICE O/P EST MOD 30 MIN: CPT

## 2022-08-12 PROCEDURE — 93880 EXTRACRANIAL BILAT STUDY: CPT

## 2022-08-12 NOTE — ASSESSMENT
[FreeTextEntry1] : 81-year-old male with a past medical history of diabetes tension, hyperlipidemia, cardiac amyloidosis, presents for evaluation of carotid stenosis\par \par Carotid duplex shows greater than 50% stenosis in the right common carotid artery with a less than 50% stenosis in bilateral internal carotid arteries.\par \par Will continue to monitor no indication for surgery at this time\par Continue simvastatin, would add baby aspirin daily\par \par Patient to follow-up in 6 months with repeat carotid duplex

## 2022-08-12 NOTE — PHYSICAL EXAM
[0] : left 0 [No Rash or Lesion] : No rash or lesion [Alert] : alert [Calm] : calm [JVD] : no jugular venous distention  [Ankle Swelling (On Exam)] : not present [Varicose Veins Of Lower Extremities] : not present [] : not present [Skin Ulcer] : no ulcer [de-identified] : Appears well [de-identified] : No facial asymmetry, tongue is midline [de-identified] : Cranial nerves II to XII intact

## 2022-08-12 NOTE — CONSULT LETTER
[Dear  ___] : Dear  [unfilled], [Consult Letter:] : I had the pleasure of evaluating your patient, [unfilled]. [Please see my note below.] : Please see my note below. [Consult Closing:] : Thank you very much for allowing me to participate in the care of this patient.  If you have any questions, please do not hesitate to contact me. [Sincerely,] : Sincerely, [FreeTextEntry3] : Skip Zepeda M.D., F.MAGEDS., R.P.NIKO.I.\par  of Vascular Surgery\par Assistant Professor of Radiology\par Director of Endovascular Program/ Vascular Access Center\par [facility]

## 2022-08-12 NOTE — HISTORY OF PRESENT ILLNESS
[FreeTextEntry1] : 81-year-old male with a past medical history of diabetes, hypertension, hyperlipidemia, cardiac amyloidosis presents for evaluation of carotid stenosis found on duplex.  Patient denies any history of strokelike symptoms.\par \par

## 2022-08-16 LAB
ALBUMIN SERPL ELPH-MCNC: 3.8 G/DL
ALP BLD-CCNC: 78 U/L
ALT SERPL-CCNC: 19 U/L
ANION GAP SERPL CALC-SCNC: 10 MMOL/L
AST SERPL-CCNC: 19 U/L
BASOPHILS # BLD AUTO: 0.03 K/UL
BASOPHILS NFR BLD AUTO: 0.9 %
BILIRUB SERPL-MCNC: 0.4 MG/DL
BUN SERPL-MCNC: 24 MG/DL
CALCIUM SERPL-MCNC: 9 MG/DL
CHLORIDE SERPL-SCNC: 110 MMOL/L
CO2 SERPL-SCNC: 25 MMOL/L
CREAT SERPL-MCNC: 1.01 MG/DL
EGFR: 75 ML/MIN/1.73M2
EOSINOPHIL # BLD AUTO: 0.04 K/UL
EOSINOPHIL NFR BLD AUTO: 1.2 %
GLUCOSE SERPL-MCNC: 93 MG/DL
HCT VFR BLD CALC: 33.6 %
HGB BLD-MCNC: 10.8 G/DL
IMM GRANULOCYTES NFR BLD AUTO: 0.3 %
LYMPHOCYTES # BLD AUTO: 1.42 K/UL
LYMPHOCYTES NFR BLD AUTO: 42 %
MAN DIFF?: NORMAL
MCHC RBC-ENTMCNC: 27.7 PG
MCHC RBC-ENTMCNC: 32.1 GM/DL
MCV RBC AUTO: 86.2 FL
MONOCYTES # BLD AUTO: 0.16 K/UL
MONOCYTES NFR BLD AUTO: 4.7 %
NEUTROPHILS # BLD AUTO: 1.72 K/UL
NEUTROPHILS NFR BLD AUTO: 50.9 %
NT-PROBNP SERPL-MCNC: 2336 PG/ML
PLATELET # BLD AUTO: 247 K/UL
POTASSIUM SERPL-SCNC: 4.7 MMOL/L
PREALB SERPL NEPH-MCNC: 20 MG/DL
PROT SERPL-MCNC: 6.6 G/DL
RBC # BLD: 3.9 M/UL
RBC # FLD: 20.1 %
SODIUM SERPL-SCNC: 145 MMOL/L
WBC # FLD AUTO: 3.38 K/UL

## 2022-09-16 ENCOUNTER — APPOINTMENT (OUTPATIENT)
Dept: CARDIOLOGY | Facility: CLINIC | Age: 82
End: 2022-09-16

## 2022-09-16 VITALS
BODY MASS INDEX: 22.32 KG/M2 | SYSTOLIC BLOOD PRESSURE: 108 MMHG | HEIGHT: 63 IN | OXYGEN SATURATION: 100 % | HEART RATE: 93 BPM | DIASTOLIC BLOOD PRESSURE: 60 MMHG | WEIGHT: 126 LBS

## 2022-09-16 PROCEDURE — 99214 OFFICE O/P EST MOD 30 MIN: CPT

## 2022-09-23 NOTE — REASON FOR VISIT
[Other: ____] : [unfilled] [Other: _____] : [unfilled] [FreeTextEntry1] : 9/16/2022.\par Pedro Jurado returns today for scheduled follow up. He is accompanied by his daughter, Radha.\par Today he is feeling well. He has been taking Vyndamax and furosemide without any difficulty.\par He has been using a cane when he walks for gait stabilization, but otherwise remains independent.\par His only complaint today is for neuropathy in his fingers and some lower back pain.\par

## 2022-09-23 NOTE — CARDIOLOGY SUMMARY
[de-identified] : 5/17/2022, NSR with 1st degree AVB, LAE. Low voltage in the limb leads. [de-identified] : 5/6/2022, septum 1.3 cm, PWT 1.2 cm, dilated LA, mild concentric LVH, severe pulmonary hypertension, LVEF 57% [de-identified] : 6/21/2022, technetium pyrophosphate scan, grade 3, ratio 1.9 [de-identified] : 5/9/2022 with Mahamed Nguyen MD at [facility] - patent coronary arteries, mild pulmonary hypertension

## 2022-09-23 NOTE — DISCUSSION/SUMMARY
[FreeTextEntry1] : Patient is an 81 year-old black gentleman with history as above who is referred now for evaluation and management of cardiac amyloidosis. \par Light chain studies to exclude AL-CA. 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 wildtype TTR amyloidosis sent today.\par \par Continue TTR stabilizer therapy with Vyndamax.\par If patient is noted to have hereditary TTR amyloidosis with polyneuropathy, consider silencer therapy.\par \par Follow up in 3 months, or sooner should need be. \par

## 2022-09-23 NOTE — END OF VISIT
[Time Spent: ___ minutes] : I have spent [unfilled] minutes of time on the encounter. [FreeTextEntry3] : I saw the patient with Esthela Gandhi NP and I agree with the findings and plan as above.

## 2022-09-23 NOTE — HISTORY OF PRESENT ILLNESS
[FreeTextEntry1] : Patient is an 81 year-old Black gentleman with known past medical history of noninsulin dependent type II diabetes, prostate ca, who has been experiencing bilateral lower extremity edema for more than a year, recently admitted to [facility], followed-up as outpatient with Leo Quesada MD, and was noted to have evidence of cardiac amyloidosis. 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 \par \par Patient's 41 year-old son was recently diagnosed with hereditary TTR amyloidosis. \par \par Patient has not been sick with Covid-19. He has received two shots and a booster, all from [employer].\par \par August 2022 - Patient returns today for follow-up. He started Vyndamax 61 mg daily approximately two weeks ago. He does not notice any change in his condition. He has run out of his loop diuretics, and so he has not been on any diuretic recently. \par

## 2022-12-15 ENCOUNTER — APPOINTMENT (OUTPATIENT)
Dept: CARDIOLOGY | Facility: CLINIC | Age: 82
End: 2022-12-15

## 2023-01-31 ENCOUNTER — APPOINTMENT (OUTPATIENT)
Dept: CARDIOLOGY | Facility: CLINIC | Age: 83
End: 2023-01-31
Payer: MEDICARE

## 2023-01-31 ENCOUNTER — NON-APPOINTMENT (OUTPATIENT)
Age: 83
End: 2023-01-31

## 2023-01-31 VITALS
DIASTOLIC BLOOD PRESSURE: 68 MMHG | OXYGEN SATURATION: 100 % | SYSTOLIC BLOOD PRESSURE: 105 MMHG | HEIGHT: 63 IN | HEART RATE: 82 BPM

## 2023-01-31 DIAGNOSIS — R35.0 FREQUENCY OF MICTURITION: ICD-10-CM

## 2023-01-31 PROCEDURE — 99215 OFFICE O/P EST HI 40 MIN: CPT | Mod: 25

## 2023-01-31 PROCEDURE — 93000 ELECTROCARDIOGRAM COMPLETE: CPT

## 2023-02-01 LAB
ALBUMIN SERPL ELPH-MCNC: 3.9 G/DL
ALP BLD-CCNC: 78 U/L
ALT SERPL-CCNC: 19 U/L
ANION GAP SERPL CALC-SCNC: 13 MMOL/L
APPEARANCE: CLEAR
AST SERPL-CCNC: 24 U/L
BASOPHILS # BLD AUTO: 0.03 K/UL
BASOPHILS NFR BLD AUTO: 0.5 %
BILIRUB SERPL-MCNC: 0.4 MG/DL
BILIRUBIN URINE: NEGATIVE
BLOOD URINE: NEGATIVE
BUN SERPL-MCNC: 30 MG/DL
CALCIUM SERPL-MCNC: 9.8 MG/DL
CHLORIDE SERPL-SCNC: 105 MMOL/L
CO2 SERPL-SCNC: 26 MMOL/L
COLOR: YELLOW
CREAT SERPL-MCNC: 1.1 MG/DL
EGFR: 67 ML/MIN/1.73M2
EOSINOPHIL # BLD AUTO: 0.17 K/UL
EOSINOPHIL NFR BLD AUTO: 3.1 %
GLUCOSE QUALITATIVE U: NEGATIVE
GLUCOSE SERPL-MCNC: 60 MG/DL
HCT VFR BLD CALC: 39 %
HGB BLD-MCNC: 12.5 G/DL
IMM GRANULOCYTES NFR BLD AUTO: 0.2 %
KETONES URINE: NEGATIVE
LEUKOCYTE ESTERASE URINE: NEGATIVE
LYMPHOCYTES # BLD AUTO: 1.97 K/UL
LYMPHOCYTES NFR BLD AUTO: 36.1 %
MAN DIFF?: NORMAL
MCHC RBC-ENTMCNC: 27.3 PG
MCHC RBC-ENTMCNC: 32.1 GM/DL
MCV RBC AUTO: 85.2 FL
MONOCYTES # BLD AUTO: 0.46 K/UL
MONOCYTES NFR BLD AUTO: 8.4 %
NEUTROPHILS # BLD AUTO: 2.82 K/UL
NEUTROPHILS NFR BLD AUTO: 51.7 %
NITRITE URINE: NEGATIVE
NT-PROBNP SERPL-MCNC: 3046 PG/ML
PH URINE: 6.5
PLATELET # BLD AUTO: 255 K/UL
POTASSIUM SERPL-SCNC: 5.8 MMOL/L
PREALB SERPL NEPH-MCNC: 19 MG/DL
PROT SERPL-MCNC: 6.3 G/DL
PROTEIN URINE: NORMAL
RBC # BLD: 4.58 M/UL
RBC # FLD: 18.1 %
SODIUM SERPL-SCNC: 144 MMOL/L
SPECIFIC GRAVITY URINE: 1.02
UROBILINOGEN URINE: NORMAL
WBC # FLD AUTO: 5.46 K/UL

## 2023-02-01 NOTE — REASON FOR VISIT
[Other: ____] : [unfilled] [Other: _____] : [unfilled] [FreeTextEntry1] : January 2023 - Patient returns today for follow-up in his usual state of health.\par Patient stopped his Vyndamax on January 1 because he thought it was causing urinary frequency. 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

## 2023-02-01 NOTE — CARDIOLOGY SUMMARY
[de-identified] : 5/17/2022, NSR with 1st degree AVB, LAE. Low voltage in the limb leads. [de-identified] : 5/6/2022, septum 1.3 cm, PWT 1.2 cm, dilated LA, mild concentric LVH, severe pulmonary hypertension, LVEF 57% [de-identified] : 6/21/2022, technetium pyrophosphate scan, grade 3, ratio 1.9 [de-identified] : 5/9/2022 with Mahamed Nguyen MD at [facility] - patent coronary arteries, mild pulmonary hypertension

## 2023-02-01 NOTE — DISCUSSION/SUMMARY
[FreeTextEntry1] : Patient is an 82 year-old black gentleman with history as above who is referred now for evaluation and management of cardiac amyloidosis. 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  [EKG obtained to assist in diagnosis and management of assessed problem(s)] : EKG obtained to assist in diagnosis and management of assessed problem(s)

## 2023-02-01 NOTE — HISTORY OF PRESENT ILLNESS
[FreeTextEntry1] : Patient is an 81 year-old Black gentleman with known past medical history of noninsulin dependent type II diabetes, prostate ca, who has been experiencing bilateral lower extremity edema for more than a year, recently admitted to [facility], followed-up as outpatient with Leo Quesada MD, and was noted to have evidence of cardiac amyloidosis. 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 \par \par Patient's 41 year-old son was recently diagnosed with hereditary TTR amyloidosis. \par \par Patient has not been sick with Covid-19. He has received two shots and a booster, all from [employer].\par \par August 2022 - Patient returns today for follow-up. He started Vyndamax 61 mg daily approximately two weeks ago. He does not notice any change in his condition. He has run out of his loop diuretics, and so he has not been on any diuretic recently. \par \par 9/16/2022.\par Pedro Jurado returns today for scheduled follow up. He is accompanied by his daughter, Radha.\par Today he is feeling well. He has been taking Vyndamax and furosemide without any difficulty.\par He has been using a cane when he walks for gait stabilization, but otherwise remains independent.\par His only complaint today is for neuropathy in his fingers and some lower back pain.\par

## 2023-02-08 LAB — VIT A SERPL-MCNC: 39.8 UG/DL

## 2023-03-01 ENCOUNTER — APPOINTMENT (OUTPATIENT)
Dept: CARDIOLOGY | Facility: CLINIC | Age: 83
End: 2023-03-01

## 2023-04-04 ENCOUNTER — INPATIENT (INPATIENT)
Facility: HOSPITAL | Age: 83
LOS: 9 days | Discharge: NURSING, REHAB OR LONG-TERM CARE | End: 2023-04-14
Attending: STUDENT IN AN ORGANIZED HEALTH CARE EDUCATION/TRAINING PROGRAM | Admitting: STUDENT IN AN ORGANIZED HEALTH CARE EDUCATION/TRAINING PROGRAM
Payer: MEDICARE

## 2023-04-04 VITALS
DIASTOLIC BLOOD PRESSURE: 68 MMHG | SYSTOLIC BLOOD PRESSURE: 110 MMHG | OXYGEN SATURATION: 100 % | HEART RATE: 98 BPM | RESPIRATION RATE: 22 BRPM | TEMPERATURE: 98 F

## 2023-04-04 DIAGNOSIS — R06.02 SHORTNESS OF BREATH: ICD-10-CM

## 2023-04-04 DIAGNOSIS — Z92.3 PERSONAL HISTORY OF IRRADIATION: Chronic | ICD-10-CM

## 2023-04-04 LAB
ALBUMIN SERPL ELPH-MCNC: 3.8 G/DL — SIGNIFICANT CHANGE UP (ref 3.3–5)
ALP SERPL-CCNC: 88 U/L — SIGNIFICANT CHANGE UP (ref 40–120)
ALT FLD-CCNC: 31 U/L — SIGNIFICANT CHANGE UP (ref 4–41)
ANION GAP SERPL CALC-SCNC: 14 MMOL/L — SIGNIFICANT CHANGE UP (ref 7–14)
ANION GAP SERPL CALC-SCNC: 16 MMOL/L — HIGH (ref 7–14)
AST SERPL-CCNC: 45 U/L — HIGH (ref 4–40)
BASOPHILS # BLD AUTO: 0.04 K/UL — SIGNIFICANT CHANGE UP (ref 0–0.2)
BASOPHILS NFR BLD AUTO: 0.7 % — SIGNIFICANT CHANGE UP (ref 0–2)
BILIRUB SERPL-MCNC: 0.4 MG/DL — SIGNIFICANT CHANGE UP (ref 0.2–1.2)
BLOOD GAS VENOUS COMPREHENSIVE RESULT: SIGNIFICANT CHANGE UP
BUN SERPL-MCNC: 21 MG/DL — SIGNIFICANT CHANGE UP (ref 7–23)
BUN SERPL-MCNC: 22 MG/DL — SIGNIFICANT CHANGE UP (ref 7–23)
CALCIUM SERPL-MCNC: 8.8 MG/DL — SIGNIFICANT CHANGE UP (ref 8.4–10.5)
CALCIUM SERPL-MCNC: 9.3 MG/DL — SIGNIFICANT CHANGE UP (ref 8.4–10.5)
CHLORIDE SERPL-SCNC: 105 MMOL/L — SIGNIFICANT CHANGE UP (ref 98–107)
CHLORIDE SERPL-SCNC: 109 MMOL/L — HIGH (ref 98–107)
CO2 SERPL-SCNC: 18 MMOL/L — LOW (ref 22–31)
CO2 SERPL-SCNC: 20 MMOL/L — LOW (ref 22–31)
CREAT SERPL-MCNC: 1.01 MG/DL — SIGNIFICANT CHANGE UP (ref 0.5–1.3)
CREAT SERPL-MCNC: 1.11 MG/DL — SIGNIFICANT CHANGE UP (ref 0.5–1.3)
EGFR: 67 ML/MIN/1.73M2 — SIGNIFICANT CHANGE UP
EGFR: 75 ML/MIN/1.73M2 — SIGNIFICANT CHANGE UP
EOSINOPHIL # BLD AUTO: 0.17 K/UL — SIGNIFICANT CHANGE UP (ref 0–0.5)
EOSINOPHIL NFR BLD AUTO: 2.9 % — SIGNIFICANT CHANGE UP (ref 0–6)
FLUAV AG NPH QL: SIGNIFICANT CHANGE UP
FLUBV AG NPH QL: SIGNIFICANT CHANGE UP
GLUCOSE SERPL-MCNC: 105 MG/DL — HIGH (ref 70–99)
GLUCOSE SERPL-MCNC: 99 MG/DL — SIGNIFICANT CHANGE UP (ref 70–99)
HCT VFR BLD CALC: 37 % — LOW (ref 39–50)
HGB BLD-MCNC: 11.8 G/DL — LOW (ref 13–17)
IANC: 3.45 K/UL — SIGNIFICANT CHANGE UP (ref 1.8–7.4)
IMM GRANULOCYTES NFR BLD AUTO: 0.9 % — SIGNIFICANT CHANGE UP (ref 0–0.9)
LYMPHOCYTES # BLD AUTO: 1.52 K/UL — SIGNIFICANT CHANGE UP (ref 1–3.3)
LYMPHOCYTES # BLD AUTO: 26.1 % — SIGNIFICANT CHANGE UP (ref 13–44)
MAGNESIUM SERPL-MCNC: 2 MG/DL — SIGNIFICANT CHANGE UP (ref 1.6–2.6)
MCHC RBC-ENTMCNC: 26.9 PG — LOW (ref 27–34)
MCHC RBC-ENTMCNC: 31.9 GM/DL — LOW (ref 32–36)
MCV RBC AUTO: 84.3 FL — SIGNIFICANT CHANGE UP (ref 80–100)
MONOCYTES # BLD AUTO: 0.59 K/UL — SIGNIFICANT CHANGE UP (ref 0–0.9)
MONOCYTES NFR BLD AUTO: 10.1 % — SIGNIFICANT CHANGE UP (ref 2–14)
NEUTROPHILS # BLD AUTO: 3.45 K/UL — SIGNIFICANT CHANGE UP (ref 1.8–7.4)
NEUTROPHILS NFR BLD AUTO: 59.3 % — SIGNIFICANT CHANGE UP (ref 43–77)
NRBC # BLD: 0 /100 WBCS — SIGNIFICANT CHANGE UP (ref 0–0)
NRBC # FLD: 0 K/UL — SIGNIFICANT CHANGE UP (ref 0–0)
NT-PROBNP SERPL-SCNC: 5162 PG/ML — HIGH
PHOSPHATE SERPL-MCNC: 3.7 MG/DL — SIGNIFICANT CHANGE UP (ref 2.5–4.5)
PLATELET # BLD AUTO: 290 K/UL — SIGNIFICANT CHANGE UP (ref 150–400)
POTASSIUM SERPL-MCNC: 4.9 MMOL/L — SIGNIFICANT CHANGE UP (ref 3.5–5.3)
POTASSIUM SERPL-MCNC: 5.7 MMOL/L — HIGH (ref 3.5–5.3)
POTASSIUM SERPL-SCNC: 4.9 MMOL/L — SIGNIFICANT CHANGE UP (ref 3.5–5.3)
POTASSIUM SERPL-SCNC: 5.7 MMOL/L — HIGH (ref 3.5–5.3)
PROT SERPL-MCNC: 6.4 G/DL — SIGNIFICANT CHANGE UP (ref 6–8.3)
RBC # BLD: 4.39 M/UL — SIGNIFICANT CHANGE UP (ref 4.2–5.8)
RBC # FLD: 17.2 % — HIGH (ref 10.3–14.5)
RSV RNA NPH QL NAA+NON-PROBE: SIGNIFICANT CHANGE UP
SARS-COV-2 RNA SPEC QL NAA+PROBE: SIGNIFICANT CHANGE UP
SODIUM SERPL-SCNC: 139 MMOL/L — SIGNIFICANT CHANGE UP (ref 135–145)
SODIUM SERPL-SCNC: 143 MMOL/L — SIGNIFICANT CHANGE UP (ref 135–145)
TROPONIN T, HIGH SENSITIVITY RESULT: 63 NG/L — CRITICAL HIGH
TSH SERPL-MCNC: 3.21 UIU/ML — SIGNIFICANT CHANGE UP (ref 0.27–4.2)
WBC # BLD: 5.82 K/UL — SIGNIFICANT CHANGE UP (ref 3.8–10.5)
WBC # FLD AUTO: 5.82 K/UL — SIGNIFICANT CHANGE UP (ref 3.8–10.5)

## 2023-04-04 PROCEDURE — 99223 1ST HOSP IP/OBS HIGH 75: CPT

## 2023-04-04 PROCEDURE — 71045 X-RAY EXAM CHEST 1 VIEW: CPT | Mod: 26

## 2023-04-04 PROCEDURE — 99285 EMERGENCY DEPT VISIT HI MDM: CPT

## 2023-04-04 RX ORDER — FUROSEMIDE 40 MG
40 TABLET ORAL ONCE
Refills: 0 | Status: COMPLETED | OUTPATIENT
Start: 2023-04-04 | End: 2023-04-04

## 2023-04-04 RX ADMIN — Medication 40 MILLIGRAM(S): at 18:39

## 2023-04-04 NOTE — H&P ADULT - NSICDXFAMILYHX_GEN_ALL_CORE_FT
FAMILY HISTORY:  No pertinent family history in first degree relatives     FAMILY HISTORY:  Father  Still living? Unknown  Family history of diabetes mellitus, Age at diagnosis: Age Unknown  Family history of hypertension, Age at diagnosis: Age Unknown    Mother  Still living? Unknown  Family history of diabetes mellitus, Age at diagnosis: Age Unknown  Family history of hypertension, Age at diagnosis: Age Unknown    Sibling  Still living? Unknown  Family history of diabetes mellitus, Age at diagnosis: Age Unknown  Family history of hypertension, Age at diagnosis: Age Unknown  Family history of diabetes mellitus, Age at diagnosis: Age Unknown  Family history of hypertension, Age at diagnosis: Age Unknown

## 2023-04-04 NOTE — H&P ADULT - PROBLEM SELECTOR PLAN 3
- with lactic acidosis (lactate = 2.2)  - pH = 7.30, pCO2 = 51.  AG = 16  - in the context of being significantly fluid overloaded as sequela of medication non-compliance for treatment of diastolic heart failure and severe pulmonary hypertension  - on supplemental oxygen via NC with good O2 Sat  - follow pulse oximetry  - f/u for improvement per successful treatment of fluid overloaded state

## 2023-04-04 NOTE — H&P ADULT - NSICDXPASTMEDICALHX_GEN_ALL_CORE_FT
PAST MEDICAL HISTORY:  Cardiac arrest     Diabetes mellitus, type 2     Edema of both lower legs     Malignant neoplasm of prostate     Meniere disease     Severe pulmonary hypertension      PAST MEDICAL HISTORY:  Diabetes mellitus, type 2     Edema of both lower legs     Malignant neoplasm of prostate     Meniere disease     Severe pulmonary hypertension

## 2023-04-04 NOTE — H&P ADULT - PROBLEM SELECTOR PLAN 2
- noted on TTE of 05/2022  - with diastolic heart failure  - currently on supplemental oxygen via NC  - HOB elevation  - diuretic as above and other management as above  - Cardiology and Pulmonology consult in the AM

## 2023-04-04 NOTE — H&P ADULT - NSHPREVIEWOFSYSTEMS_GEN_ALL_CORE
REVIEW OF SYSTEMS:    CONSTITUTIONAL: Easily fatigued.  No weakness, fever, chills or sweating  EYES/ENT: No visual changes.  No dysphagia  NECK: No pain or stiffness  RESPIRATORY: No cough or hemoptysis.  (+) shortness of breath in the context of generalized edema  CARDIOVASCULAR: No chest pain or palpitations.  Bilateral lower extremities edema  GASTROINTESTINAL: Significant firmness (described as hardness by patient) of the abdomen.  No nausea, vomiting or hematemesis.  No diarrhea or constipation. No melena or hematochezia.  GENITOURINARY: No dysuria, frequency or hematuria  MUSCULOSKELETAL: Gait difficulty due to edema (uses cane).  No joint pain, decreased ROM, erythema, warmth  NEUROLOGICAL: Dropping objects due to stiffness of the fingers.  No weakness  PSYCHIATRY: No anxiety, or depression.  SKIN: No itching, burning, rashes, or lesions   All other review of systems is negative unless indicated above.

## 2023-04-04 NOTE — ED PROVIDER NOTE - OBJECTIVE STATEMENT
82-year-old male with past medical history of cardiac amyloidosis, DM, Ménière's disease presents emergency department for 1 month of worsening dyspnea on exertion and edema.  Patient states he has had difficulty breathing with exertion for the last month that has worsened over time.  Patient now having difficulty breathing with simple exertional tasks.  Patient also endorsing increase in edema including the legs and testicles.  Patient states he stopped taking his diuretic about 3 months ago because he did not like that he had to urinate so often.  Patient denies fevers, chills, headache, chest pain, abdominal pain, nausea/vomiting, diarrhea/constipation, dysuria, hematuria. 82-year-old male with past medical history of cardiac amyloidosis, DM, Ménière's disease presents emergency department for 1 month of worsening dyspnea on exertion and edema.  Patient states he has had difficulty breathing with exertion for the last month that has worsened over time.  Patient now having difficulty breathing with simple exertional tasks.  Patient also endorsing increase in edema including the legs and testicles.  Patient states he stopped taking  tafamidis about 3 months ago because he did not like that it made him urinate more.  Patient denies fevers, chills, headache, chest pain, abdominal pain, nausea/vomiting, diarrhea/constipation, dysuria, hematuria.

## 2023-04-04 NOTE — ED PROVIDER NOTE - PHYSICAL EXAMINATION
Constitutional: VS reviewed. Alert and orientedx3,  increased WOB, diaphoretic   HEENT: Atraumatic, EOMI  CV: RRR  Lungs: Clear and equal bilaterally, no wheezes, rales or crackle. Increased work of breathing.   Abdomen: Soft, nondistended, nontender  MSK: No deformities  Skin: Warm and diaphoretic. As visualized no rashes, lesions, bruising or erythema  Neuro: Appears nonfocal  Lymph: 2+ pitting edema in extremities. Diffuse edema in testicle, lower and upper extremities. Constitutional: VS reviewed. Alert and orientedx3,  increased WOB, diaphoretic   HEENT: Atraumatic, EOMI  CV: RRR  Lungs: Clear and equal bilaterally. Increased work of breathing.   Abdomen: Soft, nondistended, nontender  MSK: No deformities. Diffuse edema in all extremities.   Skin: Warm and diaphoretic. Area of firm erythematous skin on right inner thigh. Does not extended into perineum. No TTP.   Neuro: Strength 5/5 in all extremities. Sensation intact.   Lymph: 2+ pitting edema in extremities. Diffuse edema in testicle, lower and upper extremities.

## 2023-04-04 NOTE — H&P ADULT - NSHPSOCIALHISTORY_GEN_ALL_CORE
SOCIAL HISTORY:    Marital Status:  (  )    (  ) Single        (  )        (  )        (  )   Lives with:          (  ) Alone      (  ) Spouse     (  ) Children         (  ) Parents             (  ) Other    No history of smoking  No history of alcohol abuse  No history of illegal drug use    Occupation: SOCIAL HISTORY:    Marital Status:  ( x )    (  ) Single        (  )        (  )        (  )   Lives with:          (  ) Alone      ( x ) Spouse     (  ) Children         (  ) Parents             (  ) Other    No history of smoking  No history of alcohol abuse  No history of illegal drug use    Occupation:

## 2023-04-04 NOTE — H&P ADULT - NSHPPHYSICALEXAM_GEN_ALL_CORE
Vital Signs Last 24 Hrs  T(C): 37 (04 Apr 2023 19:00), Max: 37.4 (04 Apr 2023 17:15)  T(F): 98.6 (04 Apr 2023 19:00), Max: 99.3 (04 Apr 2023 17:15)  HR: 88 (04 Apr 2023 19:00) (88 - 98)  BP: 116/73 (04 Apr 2023 19:00) (105/64 - 127/85)  BP(mean): --  RR: 22 (04 Apr 2023 19:00) (20 - 26)  SpO2: 100% (04 Apr 2023 19:00) (100% - 100%)    Parameters below as of 04 Apr 2023 19:00  Patient On (Oxygen Delivery Method): room air    ================================================== Vital Signs Last 24 Hrs  T(C): 37 (04 Apr 2023 19:00), Max: 37.4 (04 Apr 2023 17:15)  T(F): 98.6 (04 Apr 2023 19:00), Max: 99.3 (04 Apr 2023 17:15)  HR: 88 (04 Apr 2023 19:00) (88 - 98)  BP: 116/73 (04 Apr 2023 19:00) (105/64 - 127/85)  BP(mean): --  RR: 22 (04 Apr 2023 19:00) (20 - 26)  SpO2: 100% (04 Apr 2023 19:00) (100% - 100%)    Parameters below as of 04 Apr 2023 19:00  Patient On (Oxygen Delivery Method): room air    ==================================================    PHYSICAL EXAMINATION:    APPEARANCE: Adequately groomed, adequately nourished male, with increased respiratory effort, lying propped up in stretcher w/ HOB elevated  HEENT: Moist oral mucosa.  Pupils reactive to light.  EOMI  LYMPHATIC: No lymphadenopathy appreciated  CARDIOVASCULAR: (+) S1 S2.  No JVD.  No gross murmurs appreciated.  (+) edema  RESPIRATORY: Nasal cannula in place to the nares.  Somewhat decreased inspiratory effort.  Decreased sounds at the bases.  No gross wheezing, rhonchi, crackles appreciated  GASTROINTESTINAL: Mildly soft.  Non-tender.  (+) BS  GENITOURINARY: No suprapubic tenderness.  No CVA tenderness B/L  EXTREMITIES: Edema of the upper and lower extremities - involving the digits with some decreased ROM of the fingers.  No clubbing or cyanosis appreciated  MUSCULOSKELETAL: Generalized edema - including upper and lower extremities, abdomen, scrotal area, (+) ROM  SKIN: No rashes. No ecchymoses.  No cyanosis  PSYCHIATRIC: A&O x 3.  Mood & affect appropriate to situation  NEUROLOGICAL: Non-focal, OSORIO x 4 against gravity  VASCULAR: Peripheral pulses palpable

## 2023-04-04 NOTE — ED ADULT NURSE NOTE - OBJECTIVE STATEMENT
Derrell RN with patient: pt aox4, ambulatory with a cane at baseline, accompanied by daughter for worsening shortness of breath for the last few months. pt tachypneic and dyspneic on exertion. pt breathing improves when resting but remains slightly tachypneic. no wheezing or crackles notes. breath sounds clear bilaterally. severe edema to lower extremities, left upper leg worse than right. skin is hard, tight and warm to touch. abdomen is large, round and firm. pt denies tenderness or pain. no nausea/vomiting. bilateral hands mildly edematous, right hand worse than left. testicle very swollen. pt endorsed dribbling and incontinence. skin otherwise intact.  pt placed on tele. o2 at bedside if needed. right forearm 18g inserted with labs drawn, blood return noted and flushed well- aseptic technique maintained.

## 2023-04-04 NOTE — H&P ADULT - PROBLEM SELECTOR PLAN 4
- glucose = 105 on CMP  - on metformin PTA (held)  - L-ISS, per FS  - consistent carb diet  - f/u FS in the AM

## 2023-04-04 NOTE — H&P ADULT - NSHPLABSRESULTS_GEN_ALL_CORE
LAB-WORK/STUDIES:                          11.8   5.82  )-----------( 290      ( 04 Apr 2023 17:20 )             37.0     04 Apr 2023 20:00    139    |  105    |  21     ----------------------------<  99     4.9     |  20     |  1.01     Ca    8.8        04 Apr 2023 20:00  Phos  3.7       04 Apr 2023 20:00  Mg     2.00      04 Apr 2023 20:00    TPro  6.4    /  Alb  3.8    /  TBili  0.4    /  DBili  x      /  AST  45     /  ALT  31     /  AlkPhos  88     04 Apr 2023 17:20    LIVER FUNCTIONS - ( 04 Apr 2023 17:20 )  Alb: 3.8 g/dL / Pro: 6.4 g/dL / ALK PHOS: 88 U/L / ALT: 31 U/L / AST: 45 U/L / GGT: x             CAPILLARY BLOOD GLUCOSE    POCT Blood Glucose.: 212 mg/dL (04 Apr 2023 14:53)    ======================================================================        ====================================================================== LAB-WORK/STUDIES:                          11.8   5.82  )-----------( 290      ( 04 Apr 2023 17:20 )             37.0     04 Apr 2023 20:00    139    |  105    |  21     ----------------------------<  99     4.9     |  20     |  1.01     Ca    8.8        04 Apr 2023 20:00  Phos  3.7       04 Apr 2023 20:00  Mg     2.00      04 Apr 2023 20:00    TPro  6.4    /  Alb  3.8    /  TBili  0.4    /  DBili  x      /  AST  45     /  ALT  31     /  AlkPhos  88     04 Apr 2023 17:20    LIVER FUNCTIONS - ( 04 Apr 2023 17:20 )  Alb: 3.8 g/dL / Pro: 6.4 g/dL / ALK PHOS: 88 U/L / ALT: 31 U/L / AST: 45 U/L / GGT: x             CAPILLARY BLOOD GLUCOSE    POCT Blood Glucose.: 212 mg/dL (04 Apr 2023 14:53)    ======================================================================    ECG = sinus rhythm w/ sinus arrhythmia w/ 1st degree AVB at 95 pm, QTc = 452    ======================================================================

## 2023-04-04 NOTE — H&P ADULT - NSICDXPASTSURGICALHX_GEN_ALL_CORE_FT
PAST SURGICAL HISTORY:  Arm fracture, right during childhood    H/O left inguinal hernia repair     History of brachytherapy     S/P Appendectomy     S/P colonoscopy with polypectomy 2011 - benign    S/P laparoscopic cholecystectomy 2009

## 2023-04-04 NOTE — ED PROVIDER NOTE - ATTENDING CONTRIBUTION TO CARE
81 y/o M PMHx DM, Ménière's disease, cardiac amyloidosis p/w SOB on exertion and edema after stopping Tafamidis 3 months ago 2/2 frequent urination. Daughter reports these are the same symptoms that he had prior to starting the medications. Edema spreading from b/l LE up to testicles and b/l hands and abdominal edema. DE ANDA with movements in bed. SpO2 100% with NC. Plan- EKG, Labs including Melinda/VBG, CXR/COVID, IV Diuretics, Reassess

## 2023-04-04 NOTE — H&P ADULT - ASSESSMENT
[ x ]  Lab studies personally reviewed  [ x ]  Radiology personally reviewed  [ x ]  Old records personally reviewed    82 year old male, with past history significant for Cardiac arrest, Chronic lower extremities edema, Pulmonary hypertension (severe), Type 2 DM, Prostate cancer - s/p radiation seeds implant, and Meniere's disease, presented to the ED secondary to dyspnea on exertion.  Seen and evaluated at bedside;    Vital signs upon ED presentation as follows: BP = 110/68, HR = 98, RR = 22, T =36.8 C (98.2 F), O2 Sat = 100% on 2L NC.  Diagnosed with Shortness of Breath and prescribed furosemide 40 mg IV x one in the ED. [ x ]  Lab studies personally reviewed  [ x ]  Radiology personally reviewed  [ x ]  Old records personally reviewed    82 year old male, with past history significant for Chronic lower extremities edema, Pulmonary hypertension (severe), Type 2 DM, Prostate cancer - s/p radiation seeds implant, and Meniere's disease, presented to the ED secondary to dyspnea on exertion.  Seen and evaluated at bedside;    Vital signs upon ED presentation as follows: BP = 110/68, HR = 98, RR = 22, T =36.8 C (98.2 F), O2 Sat = 100% on 2L NC.  Diagnosed with Shortness of Breath and prescribed furosemide 40 mg IV x one in the ED.

## 2023-04-04 NOTE — H&P ADULT - PROBLEM SELECTOR PLAN 1
- causing progressive/worsening shortness of breath over the recent weeks, as well as edema involving the entire body, in the context of medication non-compliance due to functional incontinence causing embarrassment at times  - has orthopnea, PND, inability ambulate more than 3 feet before becoming exhausted, gait imbalance due to edema- - pCO2 = 5162, troponin = 65 with mixed acidosis  - self discontinued Tafamidis on 01/01/2023, but restarted in ~ mid March, but edema not resolved   - TTE of 05/2022 significant for severe pulmonary hypertension  - s/p furosemide 40 mg IV x one in the ED; prescribed 40 mg BID IV x 4 doses, then 40 mg PO BID (pls re-evaluate)  - holding lisinopril (from old meds list; unclear if still taking same), in the context of borderline BP  - f/u TTE and AM lab-work (ordered)  - HOB elevation 60 to 90 degrees  - intake/output, weight daily, fluid restriction of 1 liters daily  - continues on supplemental oxygen via nasal cannula  - patient will need additional counseling on the importance of medication compliance, fluid restriction (discussed same at bedside)  - Cardiology Consult in the AM; seen by Skip Edwards MD in the past (please call)  - may also benefit from Pulmonology consult; seen by Dakota Segovia DO in the past

## 2023-04-04 NOTE — H&P ADULT - HISTORY OF PRESENT ILLNESS
82 year old male, with past history significant for Cardiac arrest, Chronic lower extremities edema, Pulmonary hypertension (severe), Type 2 DM, Prostate cancer - s/p radiation seeds implant, and Meniere's disease, presented to the ED secondary to dyspnea on exertion.  Seen and evaluated at bedside;    Vital signs upon ED presentation as follows: BP = 110/68, HR = 98, RR = 22, T =36.8 C (98.2 F), O2 Sat = 100% on 2L NC.  Diagnosed with Shortness of Breath and prescribed furosemide 40 mg IV x one in the ED. 82 year old male, with past history significant for Chronic lower extremities edema, Pulmonary hypertension (severe), Type 2 DM, Prostate cancer - s/p radiation seeds implant, and Meniere's disease, presented to the ED secondary to dyspnea on exertion.  Seen and evaluated at bedside; appears a bit uncomfortable and had to take deep breaths while speaking at times.  Patient reports worsening edema involving the entire body (including the scrotum, abdomen, hands, fingers) over the past 2 to 3 weeks.  Suffers with orthopnea and PND.  Only able to walk about 3 feet before extreme exhaustion.  Has gait imbalance due to edema involving the legs (uses a cane).  Uses 2 pillows nightly.  Was prescribed Tafamidis (Vyndamax 61 mg daily) at last Hospital admission, but due to urinary frequency with functional incontinence, causing embarrassment at times, decided to forgo taking the medications since January 1st 2023.  However, after discussing the matter with his PMD, patient resumed the medication about 2 to 3 weeks ago.  However, patient already had extreme swelling which was not ameliorated with the resumption of Tafamidis.  No chest pain, palpitations, nausea, vomiting, diaphoresis, headache.  Patient notes that he was admitted for similar signs/symptoms in 05/2022    Vital signs upon ED presentation as follows: BP = 110/68, HR = 98, RR = 22, T =36.8 C (98.2 F), O2 Sat = 100% on 2L NC.  Diagnosed with Shortness of Breath and prescribed furosemide 40 mg IV x one in the ED.

## 2023-04-04 NOTE — ED PROVIDER NOTE - CLINICAL SUMMARY MEDICAL DECISION MAKING FREE TEXT BOX
82-year-old male with past medical history of cardiac amyloidosis, DM, Ménière's disease presents emergency department for 1 month of worsening dyspnea on exertion and edema. Stopped taking diuretics 3 months ago. Pt has increased work of breathing on exam. 2+ pitting edema in b/l legs. Edema of legs, testicles, abdomen and UE. Differentials include but not limited to CHF, pulmonary edema, ACS, PNA. Will get labs EKG, CXR. Will give diuretics. Dispo likely admission. 82-year-old male with past medical history of cardiac amyloidosis, DM, Ménière's disease presents emergency department for 1 month of worsening dyspnea on exertion and edema. Stopped taking tafamidis 3 months ago. Pt has increased work of breathing on exam. 2+ pitting edema in b/l legs. Edema of legs, testicles, abdomen and UE. 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 Will get labs EKG, CXR. Will give diuretics. Dispo likely admission.

## 2023-04-04 NOTE — H&P ADULT - PROBLEM SELECTOR PLAN 5
- patient submits list dated 2020, but has been hospitalized since then  - Med-Hx Pharmacist e-mailed; please f/u and update Med-Rec

## 2023-04-04 NOTE — ED PROVIDER NOTE - PROGRESS NOTE DETAILS
Jd Lafleur MD (PGY-3): Patient signed out to me by Dr. Elena, resident, pending BMP results and likely admission for dyspnea on exertion in the setting of cardiomyopathy.  Case discussed with hospitalist Dr. Terra Guevara who accepts admission for work-up of shortness of breath and fluid overload.  Patient resting comfortably, not hypoxic on room air.  Admitted to medicine for further work-up.

## 2023-04-04 NOTE — ED ADULT NURSE NOTE - NSIMPLEMENTINTERV_GEN_ALL_ED
Implemented All Fall with Harm Risk Interventions:  [place] to call system. Call bell, personal items and telephone within reach. Instruct patient to call for assistance. Room bathroom lighting operational. Non-slip footwear when patient is off stretcher. Physically safe environment: no spills, clutter or unnecessary equipment. Stretcher in lowest position, wheels locked, appropriate side rails in place. Provide visual cue, wrist band, yellow gown, etc. Monitor gait and stability. Monitor for mental status changes and reorient to person, place, and time. Review medications for side effects contributing to fall risk. Reinforce activity limits and safety measures with patient and family. Provide visual clues: red socks.

## 2023-04-05 DIAGNOSIS — I50.33 ACUTE ON CHRONIC DIASTOLIC (CONGESTIVE) HEART FAILURE: ICD-10-CM

## 2023-04-05 DIAGNOSIS — E11.9 TYPE 2 DIABETES MELLITUS WITHOUT COMPLICATIONS: ICD-10-CM

## 2023-04-05 DIAGNOSIS — Z29.9 ENCOUNTER FOR PROPHYLACTIC MEASURES, UNSPECIFIED: ICD-10-CM

## 2023-04-05 DIAGNOSIS — I27.20 PULMONARY HYPERTENSION, UNSPECIFIED: ICD-10-CM

## 2023-04-05 DIAGNOSIS — I50.9 HEART FAILURE, UNSPECIFIED: ICD-10-CM

## 2023-04-05 DIAGNOSIS — E03.9 HYPOTHYROIDISM, UNSPECIFIED: ICD-10-CM

## 2023-04-05 DIAGNOSIS — Z79.899 OTHER LONG TERM (CURRENT) DRUG THERAPY: ICD-10-CM

## 2023-04-05 DIAGNOSIS — E87.4 MIXED DISORDER OF ACID-BASE BALANCE: ICD-10-CM

## 2023-04-05 LAB
24R-OH-CALCIDIOL SERPL-MCNC: 77.4 NG/ML — SIGNIFICANT CHANGE UP (ref 30–80)
A1C WITH ESTIMATED AVERAGE GLUCOSE RESULT: 6.3 % — HIGH (ref 4–5.6)
ALBUMIN SERPL ELPH-MCNC: 3.1 G/DL — LOW (ref 3.3–5)
ALP SERPL-CCNC: 70 U/L — SIGNIFICANT CHANGE UP (ref 40–120)
ALT FLD-CCNC: 31 U/L — SIGNIFICANT CHANGE UP (ref 4–41)
ANION GAP SERPL CALC-SCNC: 10 MMOL/L — SIGNIFICANT CHANGE UP (ref 7–14)
ANION GAP SERPL CALC-SCNC: 13 MMOL/L — SIGNIFICANT CHANGE UP (ref 7–14)
AST SERPL-CCNC: 40 U/L — SIGNIFICANT CHANGE UP (ref 4–40)
BASE EXCESS BLDV CALC-SCNC: 0.7 MMOL/L — SIGNIFICANT CHANGE UP (ref -2–3)
BILIRUB SERPL-MCNC: 0.5 MG/DL — SIGNIFICANT CHANGE UP (ref 0.2–1.2)
BLOOD GAS VENOUS COMPREHENSIVE RESULT: SIGNIFICANT CHANGE UP
BUN SERPL-MCNC: 19 MG/DL — SIGNIFICANT CHANGE UP (ref 7–23)
BUN SERPL-MCNC: 22 MG/DL — SIGNIFICANT CHANGE UP (ref 7–23)
CALCIUM SERPL-MCNC: 8.3 MG/DL — LOW (ref 8.4–10.5)
CALCIUM SERPL-MCNC: 8.5 MG/DL — SIGNIFICANT CHANGE UP (ref 8.4–10.5)
CHLORIDE BLDV-SCNC: 106 MMOL/L — SIGNIFICANT CHANGE UP (ref 96–108)
CHLORIDE SERPL-SCNC: 105 MMOL/L — SIGNIFICANT CHANGE UP (ref 98–107)
CHLORIDE SERPL-SCNC: 105 MMOL/L — SIGNIFICANT CHANGE UP (ref 98–107)
CHOLEST SERPL-MCNC: 84 MG/DL — SIGNIFICANT CHANGE UP
CO2 BLDV-SCNC: 28 MMOL/L — HIGH (ref 22–26)
CO2 SERPL-SCNC: 22 MMOL/L — SIGNIFICANT CHANGE UP (ref 22–31)
CO2 SERPL-SCNC: 27 MMOL/L — SIGNIFICANT CHANGE UP (ref 22–31)
CREAT SERPL-MCNC: 1.05 MG/DL — SIGNIFICANT CHANGE UP (ref 0.5–1.3)
CREAT SERPL-MCNC: 1.07 MG/DL — SIGNIFICANT CHANGE UP (ref 0.5–1.3)
EGFR: 70 ML/MIN/1.73M2 — SIGNIFICANT CHANGE UP
EGFR: 71 ML/MIN/1.73M2 — SIGNIFICANT CHANGE UP
ESTIMATED AVERAGE GLUCOSE: 134 — SIGNIFICANT CHANGE UP
GAS PNL BLDV: 136 MMOL/L — SIGNIFICANT CHANGE UP (ref 136–145)
GLUCOSE BLDC GLUCOMTR-MCNC: 129 MG/DL — HIGH (ref 70–99)
GLUCOSE BLDC GLUCOMTR-MCNC: 130 MG/DL — HIGH (ref 70–99)
GLUCOSE BLDC GLUCOMTR-MCNC: 155 MG/DL — HIGH (ref 70–99)
GLUCOSE BLDC GLUCOMTR-MCNC: 217 MG/DL — HIGH (ref 70–99)
GLUCOSE BLDV-MCNC: 136 MG/DL — HIGH (ref 70–99)
GLUCOSE SERPL-MCNC: 127 MG/DL — HIGH (ref 70–99)
GLUCOSE SERPL-MCNC: 167 MG/DL — HIGH (ref 70–99)
HCO3 BLDV-SCNC: 27 MMOL/L — SIGNIFICANT CHANGE UP (ref 22–29)
HCT VFR BLD CALC: 35.2 % — LOW (ref 39–50)
HCT VFR BLDA CALC: 34 % — LOW (ref 39–51)
HDLC SERPL-MCNC: 38 MG/DL — LOW
HGB BLD CALC-MCNC: 11.4 G/DL — LOW (ref 12.6–17.4)
HGB BLD-MCNC: 11.3 G/DL — LOW (ref 13–17)
LACTATE BLDV-MCNC: 2 MMOL/L — SIGNIFICANT CHANGE UP (ref 0.5–2)
LIPID PNL WITH DIRECT LDL SERPL: 37 MG/DL — SIGNIFICANT CHANGE UP
MAGNESIUM SERPL-MCNC: 2 MG/DL — SIGNIFICANT CHANGE UP (ref 1.6–2.6)
MCHC RBC-ENTMCNC: 26.6 PG — LOW (ref 27–34)
MCHC RBC-ENTMCNC: 32.1 GM/DL — SIGNIFICANT CHANGE UP (ref 32–36)
MCV RBC AUTO: 82.8 FL — SIGNIFICANT CHANGE UP (ref 80–100)
NON HDL CHOLESTEROL: 46 MG/DL — SIGNIFICANT CHANGE UP
NRBC # BLD: 0 /100 WBCS — SIGNIFICANT CHANGE UP (ref 0–0)
NRBC # FLD: 0 K/UL — SIGNIFICANT CHANGE UP (ref 0–0)
NT-PROBNP SERPL-SCNC: 4542 PG/ML — HIGH
PCO2 BLDV: 47 MMHG — SIGNIFICANT CHANGE UP (ref 42–55)
PH BLDV: 7.36 — SIGNIFICANT CHANGE UP (ref 7.32–7.43)
PHOSPHATE SERPL-MCNC: 3.9 MG/DL — SIGNIFICANT CHANGE UP (ref 2.5–4.5)
PLATELET # BLD AUTO: 279 K/UL — SIGNIFICANT CHANGE UP (ref 150–400)
PO2 BLDV: 38 MMHG — SIGNIFICANT CHANGE UP (ref 25–45)
POTASSIUM BLDV-SCNC: 4.8 MMOL/L — SIGNIFICANT CHANGE UP (ref 3.5–5.1)
POTASSIUM SERPL-MCNC: 4.4 MMOL/L — SIGNIFICANT CHANGE UP (ref 3.5–5.3)
POTASSIUM SERPL-MCNC: 5.4 MMOL/L — HIGH (ref 3.5–5.3)
POTASSIUM SERPL-SCNC: 4.4 MMOL/L — SIGNIFICANT CHANGE UP (ref 3.5–5.3)
POTASSIUM SERPL-SCNC: 5.4 MMOL/L — HIGH (ref 3.5–5.3)
PROT SERPL-MCNC: 5.8 G/DL — LOW (ref 6–8.3)
RBC # BLD: 4.25 M/UL — SIGNIFICANT CHANGE UP (ref 4.2–5.8)
RBC # FLD: 16.7 % — HIGH (ref 10.3–14.5)
SAO2 % BLDV: 65.5 % — LOW (ref 67–88)
SODIUM SERPL-SCNC: 140 MMOL/L — SIGNIFICANT CHANGE UP (ref 135–145)
SODIUM SERPL-SCNC: 142 MMOL/L — SIGNIFICANT CHANGE UP (ref 135–145)
TRIGL SERPL-MCNC: 46 MG/DL — SIGNIFICANT CHANGE UP
TROPONIN T, HIGH SENSITIVITY RESULT: 67 NG/L — CRITICAL HIGH
WBC # BLD: 5 K/UL — SIGNIFICANT CHANGE UP (ref 3.8–10.5)
WBC # FLD AUTO: 5 K/UL — SIGNIFICANT CHANGE UP (ref 3.8–10.5)

## 2023-04-05 PROCEDURE — 99233 SBSQ HOSP IP/OBS HIGH 50: CPT

## 2023-04-05 RX ORDER — GABAPENTIN 400 MG/1
100 CAPSULE ORAL AT BEDTIME
Refills: 0 | Status: DISCONTINUED | OUTPATIENT
Start: 2023-04-05 | End: 2023-04-14

## 2023-04-05 RX ORDER — DEXTROSE 50 % IN WATER 50 %
25 SYRINGE (ML) INTRAVENOUS ONCE
Refills: 0 | Status: DISCONTINUED | OUTPATIENT
Start: 2023-04-05 | End: 2023-04-14

## 2023-04-05 RX ORDER — GLUCAGON INJECTION, SOLUTION 0.5 MG/.1ML
1 INJECTION, SOLUTION SUBCUTANEOUS ONCE
Refills: 0 | Status: DISCONTINUED | OUTPATIENT
Start: 2023-04-05 | End: 2023-04-14

## 2023-04-05 RX ORDER — PANTOPRAZOLE SODIUM 20 MG/1
40 TABLET, DELAYED RELEASE ORAL
Refills: 0 | Status: DISCONTINUED | OUTPATIENT
Start: 2023-04-05 | End: 2023-04-14

## 2023-04-05 RX ORDER — ACETAMINOPHEN 500 MG
650 TABLET ORAL EVERY 6 HOURS
Refills: 0 | Status: DISCONTINUED | OUTPATIENT
Start: 2023-04-05 | End: 2023-04-14

## 2023-04-05 RX ORDER — INSULIN LISPRO 100/ML
VIAL (ML) SUBCUTANEOUS
Refills: 0 | Status: DISCONTINUED | OUTPATIENT
Start: 2023-04-05 | End: 2023-04-14

## 2023-04-05 RX ORDER — SIMVASTATIN 20 MG/1
20 TABLET, FILM COATED ORAL AT BEDTIME
Refills: 0 | Status: DISCONTINUED | OUTPATIENT
Start: 2023-04-05 | End: 2023-04-14

## 2023-04-05 RX ORDER — SODIUM CHLORIDE 9 MG/ML
1000 INJECTION, SOLUTION INTRAVENOUS
Refills: 0 | Status: DISCONTINUED | OUTPATIENT
Start: 2023-04-05 | End: 2023-04-14

## 2023-04-05 RX ORDER — DEXTROSE 50 % IN WATER 50 %
15 SYRINGE (ML) INTRAVENOUS ONCE
Refills: 0 | Status: DISCONTINUED | OUTPATIENT
Start: 2023-04-05 | End: 2023-04-14

## 2023-04-05 RX ORDER — INSULIN LISPRO 100/ML
VIAL (ML) SUBCUTANEOUS AT BEDTIME
Refills: 0 | Status: DISCONTINUED | OUTPATIENT
Start: 2023-04-05 | End: 2023-04-14

## 2023-04-05 RX ORDER — FUROSEMIDE 40 MG
40 TABLET ORAL
Refills: 0 | Status: COMPLETED | OUTPATIENT
Start: 2023-04-05 | End: 2023-04-06

## 2023-04-05 RX ORDER — DEXTROSE 50 % IN WATER 50 %
12.5 SYRINGE (ML) INTRAVENOUS ONCE
Refills: 0 | Status: DISCONTINUED | OUTPATIENT
Start: 2023-04-05 | End: 2023-04-14

## 2023-04-05 RX ORDER — HEPARIN SODIUM 5000 [USP'U]/ML
5000 INJECTION INTRAVENOUS; SUBCUTANEOUS EVERY 12 HOURS
Refills: 0 | Status: DISCONTINUED | OUTPATIENT
Start: 2023-04-05 | End: 2023-04-14

## 2023-04-05 RX ORDER — LISINOPRIL 2.5 MG/1
20 TABLET ORAL DAILY
Refills: 0 | Status: DISCONTINUED | OUTPATIENT
Start: 2023-04-05 | End: 2023-04-05

## 2023-04-05 RX ADMIN — GABAPENTIN 100 MILLIGRAM(S): 400 CAPSULE ORAL at 21:27

## 2023-04-05 RX ADMIN — HEPARIN SODIUM 5000 UNIT(S): 5000 INJECTION INTRAVENOUS; SUBCUTANEOUS at 06:28

## 2023-04-05 RX ADMIN — PANTOPRAZOLE SODIUM 40 MILLIGRAM(S): 20 TABLET, DELAYED RELEASE ORAL at 06:28

## 2023-04-05 RX ADMIN — Medication 40 MILLIGRAM(S): at 06:28

## 2023-04-05 RX ADMIN — Medication 40 MILLIGRAM(S): at 14:57

## 2023-04-05 RX ADMIN — HEPARIN SODIUM 5000 UNIT(S): 5000 INJECTION INTRAVENOUS; SUBCUTANEOUS at 17:32

## 2023-04-05 RX ADMIN — SIMVASTATIN 20 MILLIGRAM(S): 20 TABLET, FILM COATED ORAL at 21:27

## 2023-04-05 NOTE — PROGRESS NOTE ADULT - PROBLEM SELECTOR PLAN 1
- causing progressive/worsening shortness of breath over the recent weeks, as well as edema involving the entire body, in the context of medication non-compliance due to functional incontinence causing embarrassment at times  - has orthopnea, PND, inability ambulate more than 3 feet before becoming exhausted, gait imbalance due to edema- - pCO2 = 5162, troponin = 65 with mixed acidosis  - self discontinued Tafamidis on 01/01/2023, but restarted in ~ mid March, but edema not resolved   - TTE of 05/2022 significant for severe pulmonary hypertension  - s/p furosemide 40 mg IV x one in the ED; prescribed 40 mg BID IV x 4 doses, then 40 mg PO BID   - holding lisinopril (from old meds list; unclear if still taking same), in the context of borderline BP  - f/u TTE   - HOB elevation 60 to 90 degrees  - intake/output, weight daily, fluid restriction of 1 liters daily  - continues on supplemental oxygen via nasal cannula  - patient counseled on the importance of medication compliance, fluid restriction.   - Cardiology Consulted Skip Edwards MD.   - may also benefit from Pulmonology consult; seen by Dakota Segovia DO in the past - causing progressive/worsening shortness of breath over the recent weeks, as well as edema involving the entire body, in the context of medication non-compliance due to functional incontinence causing embarrassment at times  - has orthopnea, PND, inability ambulate more than 3 feet before becoming exhausted, gait imbalance due to edema- - pCO2 = 5162, troponin = 65 with mixed acidosis  - self discontinued Tafamidis on 01/01/2023, but restarted in ~ mid March, but edema not resolved   - TTE of 05/2022 significant for severe pulmonary hypertension  - s/p furosemide 40 mg IV x one in the ED; prescribed 40 mg BID IV x 4 doses, then 40 mg PO BID   - holding lisinopril (from old meds list; unclear if still taking same), in the context of borderline BP  - f/u TTE   - HOB elevation 60 to 90 degrees  - intake/output, weight daily, fluid restriction of 1 liters daily  - continues on supplemental oxygen via nasal cannula  - patient counseled on the importance of medication compliance, fluid restriction.   - Cardiology Consulted Dr Jason Laurent MD is his cardiologist  - may also benefit from Pulmonology consult; seen by Dakota Segovia DO in the past

## 2023-04-05 NOTE — PROGRESS NOTE ADULT - PROBLEM SELECTOR PLAN 2
- noted on TTE of 05/2022  - with diastolic heart failure  - currently on supplemental oxygen via 2LNC  - HOB elevation  - diuretic as above and other management as above  - will consider Pulmonology consult if no improvement

## 2023-04-05 NOTE — PROGRESS NOTE ADULT - SUBJECTIVE AND OBJECTIVE BOX
Patient is a 82y old  Male who presents with a chief complaint of Shortness of breath (04 Apr 2023 21:54)      SUBJECTIVE / OVERNIGHT EVENTS:  Patient says his breathing has improved. Denies cp,  abdominal pain, N/V/D     MEDICATIONS  (STANDING):  dextrose 5%. 1000 milliLiter(s) (50 mL/Hr) IV Continuous <Continuous>  dextrose 5%. 1000 milliLiter(s) (100 mL/Hr) IV Continuous <Continuous>  dextrose 50% Injectable 25 Gram(s) IV Push once  dextrose 50% Injectable 12.5 Gram(s) IV Push once  dextrose 50% Injectable 25 Gram(s) IV Push once  furosemide   Injectable 40 milliGRAM(s) IV Push two times a day  gabapentin 100 milliGRAM(s) Oral at bedtime  glucagon  Injectable 1 milliGRAM(s) IntraMuscular once  heparin   Injectable 5000 Unit(s) SubCutaneous every 12 hours  insulin lispro (ADMELOG) corrective regimen sliding scale   SubCutaneous three times a day before meals  insulin lispro (ADMELOG) corrective regimen sliding scale   SubCutaneous at bedtime  pantoprazole    Tablet 40 milliGRAM(s) Oral before breakfast  simvastatin 20 milliGRAM(s) Oral at bedtime    MEDICATIONS  (PRN):  acetaminophen     Tablet .. 650 milliGRAM(s) Oral every 6 hours PRN Mild Pain (1 - 3)  dextrose Oral Gel 15 Gram(s) Oral once PRN Blood Glucose LESS THAN 70 milliGRAM(s)/deciliter      Vital Signs Last 24 Hrs  T(C): 36.5 (05 Apr 2023 09:46), Max: 37.4 (04 Apr 2023 17:15)  T(F): 97.7 (05 Apr 2023 09:46), Max: 99.3 (04 Apr 2023 17:15)  HR: 87 (05 Apr 2023 09:46) (87 - 98)  BP: 100/66 (05 Apr 2023 09:46) (100/66 - 127/85)  BP(mean): --  RR: 18 (05 Apr 2023 09:46) (17 - 26)  SpO2: 100% (05 Apr 2023 09:46) (100% - 100%)    Parameters below as of 05 Apr 2023 09:46  Patient On (Oxygen Delivery Method): nasal cannula  O2 Flow (L/min): 2    CAPILLARY BLOOD GLUCOSE      POCT Blood Glucose.: 129 mg/dL (05 Apr 2023 08:50)  POCT Blood Glucose.: 217 mg/dL (05 Apr 2023 00:12)  POCT Blood Glucose.: 212 mg/dL (04 Apr 2023 14:53)    I&O's Summary    04 Apr 2023 07:01  -  05 Apr 2023 07:00  --------------------------------------------------------  IN: 0 mL / OUT: 500 mL / NET: -500 mL    05 Apr 2023 07:01  -  05 Apr 2023 11:16  --------------------------------------------------------  IN: 0 mL / OUT: 2000 mL / NET: -2000 mL        PHYSICAL EXAM:   GENERAL: NAD, well-developed  HEAD:  Atraumatic, Normocephalic  EYES: EOMI, PERRLA, conjunctiva and sclera clear  NECK: Supple, No JVD  CHEST/LUNG: decreased BS bilaterally; No wheeze  HEART: Regular rate and rhythm; No murmurs, rubs, or gallops  ABDOMEN: Soft, Nontender, Nondistended; Bowel sounds present  EXTREMITIES:  1 + B/L LE edema 2+ Peripheral Pulses, No clubbing, cyanosis, or edema  PSYCH: AAOx3  NEUROLOGY: non-focal  SKIN: No rashes or lesions    LABS:                        11.3   5.00  )-----------( 279      ( 05 Apr 2023 05:20 )             35.2     04-05    140  |  105  |  22  ----------------------------<  127<H>  5.4<H>   |  22  |  1.05    Ca    8.5      05 Apr 2023 05:20  Phos  3.9     04-05  Mg     2.00     04-05    TPro  5.8<L>  /  Alb  3.1<L>  /  TBili  0.5  /  DBili  x   /  AST  40  /  ALT  31  /  AlkPhos  70  04-05              RADIOLOGY & ADDITIONAL TESTS:    Imaging Personally Reviewed:    Consultant(s) Notes Reviewed:      Care Discussed with Consultants/Other Providers:

## 2023-04-05 NOTE — PHARMACOTHERAPY INTERVENTION NOTE - COMMENTS
Medication history is saved as incomplete. Outpatient pharmacy was unable to confirm entirety of medication list in Outpatient Medication Review (OMR), as most of them were last filled in 2022 (last filled dates listed in OMR for reference).   Of Note:   - Pharmacy has Lisinopril prescription being discontinued in patient's profile; with it last being filled in July/2022 x 90 day supply.

## 2023-04-05 NOTE — PROGRESS NOTE ADULT - PROBLEM SELECTOR PLAN 4
- glucose = 105 on CMP  - on metformin PTA (held)  - L-ISS, per FS  - consistent carb diet  -monitoring FS

## 2023-04-05 NOTE — PATIENT PROFILE ADULT - FALL HARM RISK - HARM RISK INTERVENTIONS

## 2023-04-05 NOTE — PROGRESS NOTE ADULT - ASSESSMENT
82 year old male, w/ Hx  DM2, Prostate cancer - s/p radiation seeds implant, and Meniere's disease, severe Pulmonary hypertension, Chronic lower extremities edema p/w  secondary to dyspnea on exertion.

## 2023-04-06 LAB
ALBUMIN SERPL ELPH-MCNC: 2.9 G/DL — LOW (ref 3.3–5)
ALP SERPL-CCNC: 69 U/L — SIGNIFICANT CHANGE UP (ref 40–120)
ALT FLD-CCNC: 18 U/L — SIGNIFICANT CHANGE UP (ref 4–41)
ANION GAP SERPL CALC-SCNC: 13 MMOL/L — SIGNIFICANT CHANGE UP (ref 7–14)
AST SERPL-CCNC: 21 U/L — SIGNIFICANT CHANGE UP (ref 4–40)
BASOPHILS # BLD AUTO: 0.03 K/UL — SIGNIFICANT CHANGE UP (ref 0–0.2)
BASOPHILS NFR BLD AUTO: 0.4 % — SIGNIFICANT CHANGE UP (ref 0–2)
BILIRUB SERPL-MCNC: 0.6 MG/DL — SIGNIFICANT CHANGE UP (ref 0.2–1.2)
BUN SERPL-MCNC: 22 MG/DL — SIGNIFICANT CHANGE UP (ref 7–23)
CALCIUM SERPL-MCNC: 8.6 MG/DL — SIGNIFICANT CHANGE UP (ref 8.4–10.5)
CHLORIDE SERPL-SCNC: 105 MMOL/L — SIGNIFICANT CHANGE UP (ref 98–107)
CO2 SERPL-SCNC: 24 MMOL/L — SIGNIFICANT CHANGE UP (ref 22–31)
CREAT SERPL-MCNC: 1.25 MG/DL — SIGNIFICANT CHANGE UP (ref 0.5–1.3)
EGFR: 58 ML/MIN/1.73M2 — LOW
EOSINOPHIL # BLD AUTO: 0.16 K/UL — SIGNIFICANT CHANGE UP (ref 0–0.5)
EOSINOPHIL NFR BLD AUTO: 2.3 % — SIGNIFICANT CHANGE UP (ref 0–6)
GLUCOSE BLDC GLUCOMTR-MCNC: 130 MG/DL — HIGH (ref 70–99)
GLUCOSE BLDC GLUCOMTR-MCNC: 131 MG/DL — HIGH (ref 70–99)
GLUCOSE BLDC GLUCOMTR-MCNC: 159 MG/DL — HIGH (ref 70–99)
GLUCOSE BLDC GLUCOMTR-MCNC: 178 MG/DL — HIGH (ref 70–99)
GLUCOSE SERPL-MCNC: 152 MG/DL — HIGH (ref 70–99)
HCT VFR BLD CALC: 32.9 % — LOW (ref 39–50)
HGB BLD-MCNC: 10.7 G/DL — LOW (ref 13–17)
IANC: 4.8 K/UL — SIGNIFICANT CHANGE UP (ref 1.8–7.4)
IMM GRANULOCYTES NFR BLD AUTO: 0.4 % — SIGNIFICANT CHANGE UP (ref 0–0.9)
LYMPHOCYTES # BLD AUTO: 1.28 K/UL — SIGNIFICANT CHANGE UP (ref 1–3.3)
LYMPHOCYTES # BLD AUTO: 18.5 % — SIGNIFICANT CHANGE UP (ref 13–44)
MAGNESIUM SERPL-MCNC: 1.8 MG/DL — SIGNIFICANT CHANGE UP (ref 1.6–2.6)
MCHC RBC-ENTMCNC: 26.6 PG — LOW (ref 27–34)
MCHC RBC-ENTMCNC: 32.5 GM/DL — SIGNIFICANT CHANGE UP (ref 32–36)
MCV RBC AUTO: 81.8 FL — SIGNIFICANT CHANGE UP (ref 80–100)
MONOCYTES # BLD AUTO: 0.63 K/UL — SIGNIFICANT CHANGE UP (ref 0–0.9)
MONOCYTES NFR BLD AUTO: 9.1 % — SIGNIFICANT CHANGE UP (ref 2–14)
NEUTROPHILS # BLD AUTO: 4.8 K/UL — SIGNIFICANT CHANGE UP (ref 1.8–7.4)
NEUTROPHILS NFR BLD AUTO: 69.3 % — SIGNIFICANT CHANGE UP (ref 43–77)
NRBC # BLD: 0 /100 WBCS — SIGNIFICANT CHANGE UP (ref 0–0)
NRBC # FLD: 0 K/UL — SIGNIFICANT CHANGE UP (ref 0–0)
PHOSPHATE SERPL-MCNC: 3.9 MG/DL — SIGNIFICANT CHANGE UP (ref 2.5–4.5)
PLATELET # BLD AUTO: 253 K/UL — SIGNIFICANT CHANGE UP (ref 150–400)
POTASSIUM SERPL-MCNC: 4.1 MMOL/L — SIGNIFICANT CHANGE UP (ref 3.5–5.3)
POTASSIUM SERPL-SCNC: 4.1 MMOL/L — SIGNIFICANT CHANGE UP (ref 3.5–5.3)
PROT SERPL-MCNC: 5.1 G/DL — LOW (ref 6–8.3)
RBC # BLD: 4.02 M/UL — LOW (ref 4.2–5.8)
RBC # FLD: 16.4 % — HIGH (ref 10.3–14.5)
SODIUM SERPL-SCNC: 142 MMOL/L — SIGNIFICANT CHANGE UP (ref 135–145)
WBC # BLD: 6.93 K/UL — SIGNIFICANT CHANGE UP (ref 3.8–10.5)
WBC # FLD AUTO: 6.93 K/UL — SIGNIFICANT CHANGE UP (ref 3.8–10.5)

## 2023-04-06 PROCEDURE — 93306 TTE W/DOPPLER COMPLETE: CPT | Mod: 26

## 2023-04-06 PROCEDURE — 99222 1ST HOSP IP/OBS MODERATE 55: CPT | Mod: GC

## 2023-04-06 PROCEDURE — 99232 SBSQ HOSP IP/OBS MODERATE 35: CPT

## 2023-04-06 RX ORDER — FUROSEMIDE 40 MG
40 TABLET ORAL
Refills: 0 | Status: DISCONTINUED | OUTPATIENT
Start: 2023-04-06 | End: 2023-04-10

## 2023-04-06 RX ORDER — MAGNESIUM SULFATE 500 MG/ML
1 VIAL (ML) INJECTION ONCE
Refills: 0 | Status: COMPLETED | OUTPATIENT
Start: 2023-04-06 | End: 2023-04-06

## 2023-04-06 RX ADMIN — PANTOPRAZOLE SODIUM 40 MILLIGRAM(S): 20 TABLET, DELAYED RELEASE ORAL at 05:58

## 2023-04-06 RX ADMIN — Medication 1: at 12:04

## 2023-04-06 RX ADMIN — Medication 100 GRAM(S): at 19:29

## 2023-04-06 RX ADMIN — HEPARIN SODIUM 5000 UNIT(S): 5000 INJECTION INTRAVENOUS; SUBCUTANEOUS at 05:58

## 2023-04-06 RX ADMIN — SIMVASTATIN 20 MILLIGRAM(S): 20 TABLET, FILM COATED ORAL at 22:11

## 2023-04-06 RX ADMIN — GABAPENTIN 100 MILLIGRAM(S): 400 CAPSULE ORAL at 22:11

## 2023-04-06 RX ADMIN — Medication 1: at 07:52

## 2023-04-06 RX ADMIN — HEPARIN SODIUM 5000 UNIT(S): 5000 INJECTION INTRAVENOUS; SUBCUTANEOUS at 17:20

## 2023-04-06 NOTE — PROGRESS NOTE ADULT - SUBJECTIVE AND OBJECTIVE BOX
Pt got her ECHO moved to tomorrow at 1030 at [address] [place] [address]. She has an appt in [facility] at 1250. She will come in on Monday at 2pm for cooling cap fitting with Sofía. Patient is a 82y old  Male who presents with a chief complaint of Shortness of breath (05 Apr 2023 11:15)      SUBJECTIVE / OVERNIGHT EVENTS:    No events overnight. This AM, patient without n/v/d/cp/sob.      MEDICATIONS  (STANDING):  dextrose 5%. 1000 milliLiter(s) (50 mL/Hr) IV Continuous <Continuous>  dextrose 5%. 1000 milliLiter(s) (100 mL/Hr) IV Continuous <Continuous>  dextrose 50% Injectable 25 Gram(s) IV Push once  dextrose 50% Injectable 12.5 Gram(s) IV Push once  dextrose 50% Injectable 25 Gram(s) IV Push once  furosemide   Injectable 40 milliGRAM(s) IV Push two times a day  gabapentin 100 milliGRAM(s) Oral at bedtime  glucagon  Injectable 1 milliGRAM(s) IntraMuscular once  heparin   Injectable 5000 Unit(s) SubCutaneous every 12 hours  insulin lispro (ADMELOG) corrective regimen sliding scale   SubCutaneous three times a day before meals  insulin lispro (ADMELOG) corrective regimen sliding scale   SubCutaneous at bedtime  pantoprazole    Tablet 40 milliGRAM(s) Oral before breakfast  simvastatin 20 milliGRAM(s) Oral at bedtime    MEDICATIONS  (PRN):  acetaminophen     Tablet .. 650 milliGRAM(s) Oral every 6 hours PRN Mild Pain (1 - 3)  dextrose Oral Gel 15 Gram(s) Oral once PRN Blood Glucose LESS THAN 70 milliGRAM(s)/deciliter      PHYSICAL EXAM:  T(C): 36.4 (04-06-23 @ 10:10), Max: 37.3 (04-05-23 @ 21:25)  HR: 92 (04-06-23 @ 10:10) (83 - 92)  BP: 99/72 (04-06-23 @ 10:10) (99/72 - 112/65)  RR: 17 (04-06-23 @ 10:10) (16 - 18)  SpO2: 100% (04-06-23 @ 10:10) (99% - 100%)  I&O's Summary    05 Apr 2023 07:01  -  06 Apr 2023 07:00  --------------------------------------------------------  IN: 300 mL / OUT: 4120 mL / NET: -3820 mL      GENERAL: NAD, lying in bed  HEAD:  Atraumatic, Normocephalic, MMM  CHEST/LUNG: No use of accessory muscles, CTAB, breathing non-labored  COR: RR, no mrcg  ABD: Soft, ND/NT, +BS  PSYCH: AAOx3  NEUROLOGY: CN II-XII grossly intact, moving all extremities  SKIN: No rashes or lesions  EXT: wwp, 2+ edema to shins bilterally    LABS:  CAPILLARY BLOOD GLUCOSE      POCT Blood Glucose.: 159 mg/dL (06 Apr 2023 07:09)  POCT Blood Glucose.: 130 mg/dL (05 Apr 2023 21:35)  POCT Blood Glucose.: 130 mg/dL (05 Apr 2023 16:37)  POCT Blood Glucose.: 130 mg/dL (05 Apr 2023 14:37)  POCT Blood Glucose.: 155 mg/dL (05 Apr 2023 12:50)                          10.7   6.93  )-----------( 253      ( 06 Apr 2023 05:23 )             32.9     04-06    142  |  105  |  22  ----------------------------<  152<H>  4.1   |  24  |  1.25    Ca    8.6      06 Apr 2023 05:23  Phos  3.9     04-06  Mg     1.80     04-06    TPro  5.1<L>  /  Alb  2.9<L>  /  TBili  0.6  /  DBili  x   /  AST  21  /  ALT  18  /  AlkPhos  69  04-06              Culture - Blood (collected 04 Apr 2023 17:20)  Source: .Blood Blood-Peripheral  Preliminary Report (05 Apr 2023 22:02):    No growth to date.    Culture - Blood (collected 04 Apr 2023 17:15)  Source: .Blood Blood-Peripheral  Preliminary Report (05 Apr 2023 22:02):    No growth to date.        RADIOLOGY & ADDITIONAL TESTS:    Telemetry Personally Reviewed -     Imaging Personally Reviewed -     Imaging Reviewed -     Consultant(s) Notes Reviewed -       Care Discussed with Consultants/Other Providers -

## 2023-04-06 NOTE — PHYSICAL THERAPY INITIAL EVALUATION ADULT - BED MOBILITY LIMITATIONS, REHAB EVAL
RR increased to 38 breaths per minute during bed mobility,  bpm, SpO2 99% on 1.5L. Pt returned to 19 breaths per minute with ~3 minute seated break./decreased ability to use arms for pushing/pulling/decreased ability to use legs for bridging/pushing/impaired ability to control trunk for mobility

## 2023-04-06 NOTE — PHYSICAL THERAPY INITIAL EVALUATION ADULT - GAIT DEVIATIONS NOTED, PT EVAL
significant forward flexed posture; poor bilateral foot clearance/decreased maxine/decreased velocity of limb motion/decreased step length/decreased stride length/decreased weight-shifting ability

## 2023-04-06 NOTE — PROGRESS NOTE ADULT - PROBLEM SELECTOR PLAN 1
outpatient records review, patient with cardiac amyloidosis, follows w/Dr Laurent  -pt states has not been taking some medications due to urinary incontinence when he takes them  -self discontinued Tafamidis on 01/01/2023, but restarted in ~ mid March  - TTE of 05/2022 significant for severe pulmonary hypertension, but per pulm notes, RHC w/o evidence of pHTN  -will continue with IV furosemide 40mg BID  -house cardiology consulted today (spoke w/Dr LINH Mccall), will f/u recs  -lisinopril on hold as SBP 90s-110s  -TTE ordered  - intake/output, weight daily, fluid restriction of 1 liters daily  - continues on supplemental oxygen via nasal cannula

## 2023-04-06 NOTE — CONSULT NOTE ADULT - ATTENDING COMMENTS
Pedro Jurado is an 82-year-old man with history of cardiac amyloidosis, pHTN, DMII and prostate ca (s/p radiation seeds). He presented with dyspnea on exertion due to ADHF in the setting of cardiac amyloidosis, (followed by Dr. Laurent at [facility]). There is evidence of volume overload on examination. Initial plan is to start furosemide (Lasix) 40mg IV BID, with goal net negative fluid balance 1-2L. Maintain serum potassium > 4.0 mmol/L and serum magnesium > 2.0 mg/dL. Maintain lisinopril 20 mg daily and simvastatin 20 mg daily at bedtime.

## 2023-04-06 NOTE — PROGRESS NOTE ADULT - PROBLEM SELECTOR PLAN 2
- noted on TTE of 05/2022, but per pulm notes, no pHTN noted on RHC  - cardiology consulted as above, await recommendations

## 2023-04-06 NOTE — PROGRESS NOTE ADULT - PROBLEM SELECTOR PLAN 5
- Heparin SQ
- patient submits list dated 2020, but has been hospitalized since then  - Med-Hx Pharmacist e-mailed; please f/u and update Med-Rec

## 2023-04-06 NOTE — PHYSICAL THERAPY INITIAL EVALUATION ADULT - ADDITIONAL COMMENTS
Pt lives in a private house with his spouse; however, wife is currently at short-term rehab and pt is alone. There are 6 steps to enter and 1 flight to bedroom on second floor. Household ambulator without assistive device; community ambulator with single point cane.
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
## 2023-04-06 NOTE — CONSULT NOTE ADULT - TIME BILLING
82-year-old man with history of cardiac amyloidosis, pHTN, DMII and prostate ca (s/p radiation seeds), now with dyspnea on exertion due to acute decompensated systolic heart failure in the setting of cardiac amyloidosis, requiring IV diuretic 82-year-old man with history of cardiac amyloidosis, pHTN, DMII and prostate ca (s/p radiation seeds), now with dyspnea on exertion due to acute decompensated diastolic heart failure in the setting of cardiac amyloidosis, requiring IV diuretic

## 2023-04-06 NOTE — PHYSICAL THERAPY INITIAL EVALUATION ADULT - PERTINENT HX OF CURRENT PROBLEM, REHAB EVAL
Pt is an 82 year old male presenting with dyspnea on exertion, worsening edema involving the entire body, orthopnea, gait imbalance, and urinary frequency with functional incontinence. Pt was prescribed Tafamidis (Vyndamax 61 mg daily) at last Hospital admission, but due to urinary frequency with functional incontinence but decided to forgo taking the medications since January 1st 2023. CXR clear lungs. Pt admitted for acute on chronic diastolic congestive heart failure.

## 2023-04-06 NOTE — PHYSICAL THERAPY INITIAL EVALUATION ADULT - GENERAL OBSERVATIONS, REHAB EVAL
Upon entry, pt semi-supine in bed in NAD; + telemetry monitoring, + nasal cannula. HR 91 bpm SpO2 100% on 1.5L O2 via nasal cannula.

## 2023-04-06 NOTE — PROGRESS NOTE ADULT - PROBLEM SELECTOR PLAN 3
- in the context of being significantly fluid overloaded as sequela of medication non-compliance for treatment of diastolic heart failure   - on supplemental oxygen via NC with good O2 Sat  - follow pulse oximetry  - f/u for improvement per successful treatment of fluid overloaded state

## 2023-04-06 NOTE — CONSULT NOTE ADULT - SUBJECTIVE AND OBJECTIVE BOX
Cardiology Consult Note   [Please check [email or web address] password: "[email or web address]" for cardiology service schedule and contact information]    HPI:  82M w/ hx of cardiac amyloidosis, pHTN, DMII, prostate ca (s/p radiation seeds), presenting with dyspnea on exertion.    The pt reports for last 2-3 weeks has noticed worsening total body edema, including swelling in his legs, arms, scrotum, abdomen. Has also noticed dyspnea on exertion and orthopnea. Due to these symptoms, pt presented to ED for further care. Denies recent chest pain, palpitations, n/v, light-headed/dizzy/fainting.      PAST MEDICAL & SURGICAL HISTORY:  Malignant neoplasm of prostate      Meniere disease      Severe pulmonary hypertension      Diabetes mellitus, type 2      Edema of both lower legs      S/P Appendectomy      S/P colonoscopy with polypectomy  2011 - benign      H/O left inguinal hernia repair      Arm fracture, right  during childhood      S/P laparoscopic cholecystectomy  2009      History of brachytherapy        FAMILY HISTORY:  Family history of hypertension (Father, Mother, Sibling, Sibling)    Family history of diabetes mellitus (Father, Mother, Sibling, Sibling)      SOCIAL HISTORY:  unchanged    MEDICATIONS:  heparin   Injectable 5000 Unit(s) SubCutaneous every 12 hours        acetaminophen     Tablet .. 650 milliGRAM(s) Oral every 6 hours PRN  gabapentin 100 milliGRAM(s) Oral at bedtime    pantoprazole    Tablet 40 milliGRAM(s) Oral before breakfast    dextrose 50% Injectable 25 Gram(s) IV Push once  dextrose 50% Injectable 12.5 Gram(s) IV Push once  dextrose 50% Injectable 25 Gram(s) IV Push once  dextrose Oral Gel 15 Gram(s) Oral once PRN  glucagon  Injectable 1 milliGRAM(s) IntraMuscular once  insulin lispro (ADMELOG) corrective regimen sliding scale   SubCutaneous three times a day before meals  insulin lispro (ADMELOG) corrective regimen sliding scale   SubCutaneous at bedtime  simvastatin 20 milliGRAM(s) Oral at bedtime    dextrose 5%. 1000 milliLiter(s) IV Continuous <Continuous>  dextrose 5%. 1000 milliLiter(s) IV Continuous <Continuous>      REVIEW OF SYSTEMS:  CV: chest pain (-), palpitation (-), orthopnea (+), PND (-), edema (+)  PULM: SOB (+), cough (-), wheezing (-), hemoptysis (-).   CONST: fever (-), chills (-) or fatigue (-)  GI: abdominal distension (-), abdominal pain (-) , nausea/vomiting (-), hematemesis, (-), melena (-), hematochezia (-)  : dysuria (-), frequency (-), hematuria (-).   NEURO: numbness (-), weakness (-), dizziness (-)  SKIN: itching (-), rash (-)  HEENT:  visual changes (-); vertigo or throat pain (-);  neck stiffness (-)     All other review of systems is negative unless indicated above.   -------------------------------------------------------------------------------------------  PHYSICAL EXAM:  T(C): 36.6 (04-06-23 @ 16:13), Max: 37.3 (04-05-23 @ 21:25)  HR: 90 (04-06-23 @ 16:13) (84 - 92)  BP: 110/60 (04-06-23 @ 16:13) (99/72 - 112/65)  RR: 18 (04-06-23 @ 16:13) (17 - 18)  SpO2: 100% (04-06-23 @ 16:13) (99% - 100%)  Wt(kg): --  I&O's Summary    05 Apr 2023 07:01  -  06 Apr 2023 07:00  --------------------------------------------------------  IN: 300 mL / OUT: 4120 mL / NET: -3820 mL        GENERAL: NAD  HEAD: Atraumatic, Normocephalic.  EYES: EOMI, PERRLA, conjunctiva and sclera clear.  ENT: Moist mucous membranes.  NECK: Supple, No JVD.  CHEST/LUNG: Decreased breath sounds at bases b/l, +crackles  HEART: Regular rate and rhythm; No murmurs, rubs, or gallops.  ABDOMEN: Bowel sounds present; Soft, Nontender, Nondistended.   EXTREMITIES:  2+ Peripheral Pulses, brisk capillary refill. No clubbing, cyanosis. 1+ edema up to lower shins b/l  PSYCH: Normal affect.  SKIN: No rashes or lesions.    -------------------------------------------------------------------------------------------  LABS:                          10.7   6.93  )-----------( 253      ( 06 Apr 2023 05:23 )             32.9     04-06    142  |  105  |  22  ----------------------------<  152<H>  4.1   |  24  |  1.25    Ca    8.6      06 Apr 2023 05:23  Phos  3.9     04-06  Mg     1.80     04-06    TPro  5.1<L>  /  Alb  2.9<L>  /  TBili  0.6  /  DBili  x   /  AST  21  /  ALT  18  /  AlkPhos  69  04-06      CARDIAC MARKERS ( 05 Apr 2023 05:20 )  67 ng/L / x     / x     / x     / x     / x      CARDIAC MARKERS ( 04 Apr 2023 20:00 )  63 ng/L / x     / x     / x     / x     / x      CARDIAC MARKERS ( 04 Apr 2023 17:20 )  65 ng/L / x     / x     / x     / x     / x              acetaminophen     Tablet .. 650 milliGRAM(s) Oral every 6 hours PRN  gabapentin 100 milliGRAM(s) Oral at bedtime      -------------------------------------------------------------------------------------------  TTE:  Ejection Fraction (Modified Rizzo Rule): 53 %  ------------------------------------------------------------------------  OBSERVATIONS:  Mitral Valve: Normal mitral valve. Minimal mitral  regurgitation.  Aortic Root: Aortic Root: 2.8 cm.  Ascending Aorta: 3.2 cm.  Aortic Valve: Calcified trileaflet aortic valve with normal  opening. Peak transaortic valve gradient equals 15 mm Hg,  mean transaortic valve gradient equals 8 mm Hg. Minimal  aortic regurgitation.  Peak left ventricular outflow tract  gradient equals 2 mm Hg, mean gradient is equal to 1 mm Hg.  Left Atrium: Moderately dilated left atrium.  LA volume  index = 45 cc/m2.  Left Ventricle: Low normal left ventricular systolic  function.  Severe concentric left ventricular hypertrophy.  Normal left ventricular diastolic function.  Right Heart: Normal right atrium. Normal right ventricular  size with decreased right ventricular systolic function.  Normal tricuspid valve. Mild-moderate tricuspid  regurgitation. Normal pulmonic valve. Mild pulmonic  regurgitation.  Pericardium/PleuraNormal pericardium with no pericardial  effusion.  Hemodynamic: Estimated right ventricular systolic pressure  equals 53 mm Hg, assuming right atrial pressure equals 10  mm Hg, consistent with moderate pulmonary hypertension.  ------------------------------------------------------------------------  CONCLUSIONS:  1. Calcified trileaflet aortic valve with normal opening.  2. Moderately dilated left atrium.  LA volume index = 45  cc/m2.  3. Severe concentric left ventricular hypertrophy.  4. Low normal left ventricular systolic function.  5. Normal right ventricular size with decreased right  ventricular systolic function.  6. Estimated pulmonary artery systolic pressure equals 53  mm Hg, assuming right atrial pressure equals 10  mm Hg,  consistent with moderate pulmonary hypertension.      -------------------------------------------------------------------------------------------

## 2023-04-06 NOTE — CONSULT NOTE ADULT - ASSESSMENT
82M w/ hx of cardiac amyloidosis, pHTN, DMII, prostate ca (s/p radiation seeds), presenting with dyspnea on exertion, 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 Cardiology consulted for assistance in care.    #ADHF: hx of cardiac amyloidosis, follows with Dr. Laurent  - remains volume overloaded on exam  - start lasix 40mg IV BID, goal net negative 1-2L, if not at goal uptitrate  - monitor strict I/O's, daily standing weights  - replete K4Mg2  - c/w tele monitoring  - c/w home lisinopril 20mg daily  - c/w home simvastatin 20mg daily    Dandy Mccall MD  Cardiology Fellow - PGY4    Please check [email or web address] password: [email or web address]" for cardiology service schedule and contact information.

## 2023-04-07 ENCOUNTER — TRANSCRIPTION ENCOUNTER (OUTPATIENT)
Age: 83
End: 2023-04-07

## 2023-04-07 LAB
ANION GAP SERPL CALC-SCNC: 10 MMOL/L — SIGNIFICANT CHANGE UP (ref 7–14)
BUN SERPL-MCNC: 21 MG/DL — SIGNIFICANT CHANGE UP (ref 7–23)
CALCIUM SERPL-MCNC: 8.6 MG/DL — SIGNIFICANT CHANGE UP (ref 8.4–10.5)
CHLORIDE SERPL-SCNC: 102 MMOL/L — SIGNIFICANT CHANGE UP (ref 98–107)
CO2 SERPL-SCNC: 24 MMOL/L — SIGNIFICANT CHANGE UP (ref 22–31)
CREAT SERPL-MCNC: 0.96 MG/DL — SIGNIFICANT CHANGE UP (ref 0.5–1.3)
EGFR: 79 ML/MIN/1.73M2 — SIGNIFICANT CHANGE UP
GLUCOSE BLDC GLUCOMTR-MCNC: 128 MG/DL — HIGH (ref 70–99)
GLUCOSE BLDC GLUCOMTR-MCNC: 143 MG/DL — HIGH (ref 70–99)
GLUCOSE BLDC GLUCOMTR-MCNC: 162 MG/DL — HIGH (ref 70–99)
GLUCOSE BLDC GLUCOMTR-MCNC: 174 MG/DL — HIGH (ref 70–99)
GLUCOSE SERPL-MCNC: 232 MG/DL — HIGH (ref 70–99)
HCT VFR BLD CALC: 34.3 % — LOW (ref 39–50)
HGB BLD-MCNC: 11.2 G/DL — LOW (ref 13–17)
MAGNESIUM SERPL-MCNC: 1.9 MG/DL — SIGNIFICANT CHANGE UP (ref 1.6–2.6)
MCHC RBC-ENTMCNC: 27.4 PG — SIGNIFICANT CHANGE UP (ref 27–34)
MCHC RBC-ENTMCNC: 32.7 GM/DL — SIGNIFICANT CHANGE UP (ref 32–36)
MCV RBC AUTO: 83.9 FL — SIGNIFICANT CHANGE UP (ref 80–100)
NRBC # BLD: 0 /100 WBCS — SIGNIFICANT CHANGE UP (ref 0–0)
NRBC # FLD: 0 K/UL — SIGNIFICANT CHANGE UP (ref 0–0)
PHOSPHATE SERPL-MCNC: 3.4 MG/DL — SIGNIFICANT CHANGE UP (ref 2.5–4.5)
PLATELET # BLD AUTO: 239 K/UL — SIGNIFICANT CHANGE UP (ref 150–400)
POTASSIUM SERPL-MCNC: 3.9 MMOL/L — SIGNIFICANT CHANGE UP (ref 3.5–5.3)
POTASSIUM SERPL-SCNC: 3.9 MMOL/L — SIGNIFICANT CHANGE UP (ref 3.5–5.3)
RBC # BLD: 4.09 M/UL — LOW (ref 4.2–5.8)
RBC # FLD: 16.4 % — HIGH (ref 10.3–14.5)
SODIUM SERPL-SCNC: 136 MMOL/L — SIGNIFICANT CHANGE UP (ref 135–145)
WBC # BLD: 5.77 K/UL — SIGNIFICANT CHANGE UP (ref 3.8–10.5)
WBC # FLD AUTO: 5.77 K/UL — SIGNIFICANT CHANGE UP (ref 3.8–10.5)

## 2023-04-07 PROCEDURE — 99232 SBSQ HOSP IP/OBS MODERATE 35: CPT

## 2023-04-07 PROCEDURE — 99232 SBSQ HOSP IP/OBS MODERATE 35: CPT | Mod: GC

## 2023-04-07 PROCEDURE — 93010 ELECTROCARDIOGRAM REPORT: CPT

## 2023-04-07 RX ORDER — LISINOPRIL 2.5 MG/1
10 TABLET ORAL EVERY 24 HOURS
Refills: 0 | Status: DISCONTINUED | OUTPATIENT
Start: 2023-04-07 | End: 2023-04-10

## 2023-04-07 RX ADMIN — Medication 1: at 07:58

## 2023-04-07 RX ADMIN — GABAPENTIN 100 MILLIGRAM(S): 400 CAPSULE ORAL at 21:31

## 2023-04-07 RX ADMIN — HEPARIN SODIUM 5000 UNIT(S): 5000 INJECTION INTRAVENOUS; SUBCUTANEOUS at 18:01

## 2023-04-07 RX ADMIN — SIMVASTATIN 20 MILLIGRAM(S): 20 TABLET, FILM COATED ORAL at 21:31

## 2023-04-07 RX ADMIN — Medication 1: at 11:47

## 2023-04-07 RX ADMIN — HEPARIN SODIUM 5000 UNIT(S): 5000 INJECTION INTRAVENOUS; SUBCUTANEOUS at 06:25

## 2023-04-07 RX ADMIN — Medication 40 MILLIGRAM(S): at 06:25

## 2023-04-07 RX ADMIN — PANTOPRAZOLE SODIUM 40 MILLIGRAM(S): 20 TABLET, DELAYED RELEASE ORAL at 06:25

## 2023-04-07 NOTE — PROGRESS NOTE ADULT - SUBJECTIVE AND OBJECTIVE BOX
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 Cardiology consulted for assistance in care.    Patient seen and examined at bedside.    Overnight Events: VERONICA. Notes that his SOB is improving today, denies FC NV CP SOB palpitations. Had an episode of ?vertigo, where he woke up and the room was "tilted on its side" but states it only lasted a few seconds and resolved spontaneously.     Telemetry: NSR 70-90s, PACs, PVCs    Current Meds:  acetaminophen     Tablet .. 650 milliGRAM(s) Oral every 6 hours PRN  dextrose 5%. 1000 milliLiter(s) IV Continuous <Continuous>  dextrose 5%. 1000 milliLiter(s) IV Continuous <Continuous>  dextrose 50% Injectable 25 Gram(s) IV Push once  dextrose 50% Injectable 12.5 Gram(s) IV Push once  dextrose 50% Injectable 25 Gram(s) IV Push once  dextrose Oral Gel 15 Gram(s) Oral once PRN  furosemide   Injectable 40 milliGRAM(s) IV Push two times a day  gabapentin 100 milliGRAM(s) Oral at bedtime  glucagon  Injectable 1 milliGRAM(s) IntraMuscular once  heparin   Injectable 5000 Unit(s) SubCutaneous every 12 hours  insulin lispro (ADMELOG) corrective regimen sliding scale   SubCutaneous three times a day before meals  insulin lispro (ADMELOG) corrective regimen sliding scale   SubCutaneous at bedtime  pantoprazole    Tablet 40 milliGRAM(s) Oral before breakfast  simvastatin 20 milliGRAM(s) Oral at bedtime      Vitals:  T(F): 99.9 (04-07), Max: 99.9 (04-07)  HR: 88 (04-07) (84 - 92)  BP: 115/67 (04-07) (100/56 - 115/67)  RR: 18 (04-07)  SpO2: 100% (04-07)  I&O's Summary    06 Apr 2023 07:01  -  07 Apr 2023 07:00  --------------------------------------------------------  IN: 240 mL / OUT: 1250 mL / NET: -1010 mL        Physical Exam:  Appearance: No acute distress; well appearing  Eyes: PERRL, EOMI, pink conjunctiva  HEENT: Normal oral mucosa  Cardiovascular: RRR, S1, S2, no murmurs, rubs, or gallops; no edema; JVD to 12 cm   Respiratory: Bibasilar crackles, no inc WOB on 1L NC   Gastrointestinal: soft, non-tender, non-distended with normal bowel sounds  Musculoskeletal: No clubbing; no joint deformity   Neurologic: Non-focal  Lymphatic: No lymphadenopathy  Psychiatry: AAOx3, mood & affect appropriate  Skin: No rashes, ecchymoses, or cyanosis                          11.2   5.77  )-----------( 239      ( 07 Apr 2023 08:40 )             34.3     04-07    136  |  102  |  21  ----------------------------<  232<H>  3.9   |  24  |  0.96    Ca    8.6      07 Apr 2023 08:40  Phos  3.4     04-07  Mg     1.90     04-07    TPro  5.1<L>  /  Alb  2.9<L>  /  TBili  0.6  /  DBili  x   /  AST  21  /  ALT  18  /  AlkPhos  69  04-06      CARDIAC MARKERS ( 05 Apr 2023 05:20 )  67 ng/L / x     / x     / x     / x     / x      CARDIAC MARKERS ( 04 Apr 2023 20:00 )  63 ng/L / x     / x     / x     / x     / x      CARDIAC MARKERS ( 04 Apr 2023 17:20 )  65 ng/L / x     / x     / x     / x     / x          A/P  82M w/ hx of cardiac amyloidosis, pHTN, DMII, prostate ca (s/p radiation seeds), presenting with dyspnea on exertion, 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 Cardiology consulted for assistance in care.    #ADHF: hx of cardiac amyloidosis, follows with Dr. Laurent  - remains volume overloaded on exam  - Cont lasix 40mg IV BID, goal net negative 1-2L, if not at goal uptitrate  - monitor strict I/O's, daily standing weights  - replete K4Mg2  - c/w tele monitoring  - c/w home lisinopril 20mg daily  - c/w home simvastatin 20mg daily      Dave Muniz PGY4  Cardiology Fellow    [facility]

## 2023-04-07 NOTE — DISCHARGE NOTE PROVIDER - NSDCCPCAREPLAN_GEN_ALL_CORE_FT
PRINCIPAL DISCHARGE DIAGNOSIS  Diagnosis: Shortness of breath  Assessment and Plan of Treatment: Your shortness of breath was due to congestive heart failure related to cardiac amyloid disease. You were seen by the cardiology team. You were given medication to remove excess fluid from your body. ____     PRINCIPAL DISCHARGE DIAGNOSIS  Diagnosis: Shortness of breath  Assessment and Plan of Treatment: Your shortness of breath was due to congestive heart failure related to cardiac amyloid disease. You were seen by the cardiology team. You were given medication to remove excess fluid from your body. You will need to continue diuretics, Lasix 20mg daily. Monitor for fluid overload and over diuresis. Please follow up with Cardiologist within one week of discharge.      SECONDARY DISCHARGE DIAGNOSES  Diagnosis: Acute on chronic diastolic congestive heart failure  Assessment and Plan of Treatment: You have hx of Cardiac amyloidosis and  follows with Dr Laurent.   Please continue Tafamidis on 01/01/2023.   You had echocardiogram which showed concentric Left venticular hypertrophy, EF 53%.   You were started on IV furosemide 40mg BID and transitioned to oral Lasix 20mg daily.   Cardiology saw you and recommended continue diuretics. As per cardiology due to underlying Amyloidosis, you will have low BP but it is acceptable if your BP is over 90 and tolerating it.   You will need to monitor intake/output, daily weight, and you will need to follow  fluid restriction of 1L daily.   You were initially on supplemental oxygen and weaned off to room air.   Please follow up with cardiology and PCP. You will need repeat labs in one week.    Diagnosis: Type 2 diabetes mellitus treated without insulin  Assessment and Plan of Treatment: Please hold PO medications and continue insulin regimen. Continue Lantus and insulin sliding scale. Continue consistent carb diet  Please see your PCP  to have your A1c checked every 3 months. You will need to return at least once each year to have your feet checked. You will need an eye exam once a year to check for retinopathy. You will also need urine tests every year to check for kidney problems. You may need tests to monitor for heart disease such as an EKG, stress test, blood pressure monitoring, and blood tests. Write down your questions so you remember to ask them during your visits.  You will need to check your blood sugar level at least 3 times each day if you are on insulin. If you check your blood sugar level before a meal , it should be between 80 and 130 mg/dL. If you check your blood sugar level 1 to 2 hours after a meal , it should be less than 180 mg/dL.  Your blood sugar level is too low if it goes below 70 mg/dL. If the level is too low, eat or drink 15 grams of fast-acting carbohydrates, such as 1/2 cup fruit juice or 4 oz. regular soda. Check your blood sugar level 15 minutes later. If the level is still low (less than 100 mg/dL), drink another serving.   Do not skip meals. Your blood sugar level may drop too low if you have taken diabetes medicine and do not eat.  Please seek medical attention immediately if:  You have severe abdominal pain, or the pain spreads to your back. You may also be vomiting.  You have trouble staying awake or focusing.  You are shaking or sweating.  You have blurred or double vision.  Your breath has a fruity, sweet smell.  Your breathing is deep and labored, or rapid and shallow.  Your heartbeat is fast and weak.    Diagnosis: Severe pulmonary hypertension  Assessment and Plan of Treatment: Please follow up with Cardiologist and pulmonologist after discharge.

## 2023-04-07 NOTE — PROGRESS NOTE ADULT - SUBJECTIVE AND OBJECTIVE BOX
Patient is a 82y old  Male who presents with a chief complaint of Shortness of breath (07 Apr 2023 11:54)    SUBJECTIVE / OVERNIGHT EVENTS:    No events overnight. This AM, patient without n/v/d/cp/sob.      MEDICATIONS  (STANDING):  dextrose 5%. 1000 milliLiter(s) (50 mL/Hr) IV Continuous <Continuous>  dextrose 5%. 1000 milliLiter(s) (100 mL/Hr) IV Continuous <Continuous>  dextrose 50% Injectable 25 Gram(s) IV Push once  dextrose 50% Injectable 12.5 Gram(s) IV Push once  dextrose 50% Injectable 25 Gram(s) IV Push once  furosemide   Injectable 40 milliGRAM(s) IV Push two times a day  gabapentin 100 milliGRAM(s) Oral at bedtime  glucagon  Injectable 1 milliGRAM(s) IntraMuscular once  heparin   Injectable 5000 Unit(s) SubCutaneous every 12 hours  insulin lispro (ADMELOG) corrective regimen sliding scale   SubCutaneous three times a day before meals  insulin lispro (ADMELOG) corrective regimen sliding scale   SubCutaneous at bedtime  pantoprazole    Tablet 40 milliGRAM(s) Oral before breakfast  simvastatin 20 milliGRAM(s) Oral at bedtime    MEDICATIONS  (PRN):  acetaminophen     Tablet .. 650 milliGRAM(s) Oral every 6 hours PRN Mild Pain (1 - 3)  dextrose Oral Gel 15 Gram(s) Oral once PRN Blood Glucose LESS THAN 70 milliGRAM(s)/deciliter      PHYSICAL EXAM:  T(C): 37.7 (04-07-23 @ 09:54), Max: 37.7 (04-07-23 @ 09:54)  HR: 88 (04-07-23 @ 09:54) (84 - 92)  BP: 115/67 (04-07-23 @ 09:54) (100/56 - 115/67)  RR: 18 (04-07-23 @ 09:54) (17 - 18)  SpO2: 100% (04-07-23 @ 09:54) (99% - 100%)  I&O's Summary    06 Apr 2023 07:01  -  07 Apr 2023 07:00  --------------------------------------------------------  IN: 240 mL / OUT: 1250 mL / NET: -1010 mL    07 Apr 2023 07:01  -  07 Apr 2023 12:54  --------------------------------------------------------  IN: 240 mL / OUT: 120 mL / NET: 120 mL      GENERAL: NAD, lying in bed  HEAD:  Atraumatic, Normocephalic, MMM  CHEST/LUNG: No use of accessory muscles, CTAB, breathing non-labored  COR: RR, no mrcg  ABD: Soft, ND/NT, +BS  PSYCH: AAOx3  NEUROLOGY: CN II-XII grossly intact, moving all extremities  SKIN: No rashes or lesions  EXT: wwp, no cc, 2+ edema to shins bilaterally    LABS:  CAPILLARY BLOOD GLUCOSE      POCT Blood Glucose.: 174 mg/dL (07 Apr 2023 11:18)  POCT Blood Glucose.: 162 mg/dL (07 Apr 2023 07:39)  POCT Blood Glucose.: 130 mg/dL (06 Apr 2023 22:13)  POCT Blood Glucose.: 131 mg/dL (06 Apr 2023 16:28)                          11.2   5.77  )-----------( 239      ( 07 Apr 2023 08:40 )             34.3     04-07    136  |  102  |  21  ----------------------------<  232<H>  3.9   |  24  |  0.96    Ca    8.6      07 Apr 2023 08:40  Phos  3.4     04-07  Mg     1.90     04-07    TPro  5.1<L>  /  Alb  2.9<L>  /  TBili  0.6  /  DBili  x   /  AST  21  /  ALT  18  /  AlkPhos  69  04-06              Culture - Blood (collected 04 Apr 2023 17:20)  Source: .Blood Blood-Peripheral  Preliminary Report (05 Apr 2023 22:02):    No growth to date.    Culture - Blood (collected 04 Apr 2023 17:15)  Source: .Blood Blood-Peripheral  Preliminary Report (05 Apr 2023 22:02):    No growth to date.        RADIOLOGY & ADDITIONAL TESTS:    Telemetry Personally Reviewed - SR, PACs    Imaging Personally Reviewed -     Imaging Reviewed -     Consultant(s) Notes Reviewed -   cardio    Care Discussed with Consultants/Other Providers -

## 2023-04-07 NOTE — PROGRESS NOTE ADULT - PROBLEM SELECTOR PLAN 1
-patient with cardiac amyloidosis, follows w/Dr Laurent  -pt states has not been taking some medications due to urinary incontinence when he takes them  -self discontinued Tafamidis on 01/01/2023, but restarted in ~ mid March  -TTE of 05/2022 significant for severe pulmonary hypertension, but per pulm notes, RHC w/o evidence of pHTN  -house cardiology consulted, recs reviewed   -continue with IV furosemide 40mg BID  -will start lisinopril at reduced dose (10mg) daily  -TTE reviewed - concentric LVH, EF 53%  - monitor intake/output, weight daily, fluid restriction of 1 liters daily  - continues on supplemental oxygen via nasal cannula

## 2023-04-07 NOTE — DISCHARGE NOTE PROVIDER - NSFOLLOWUPCLINICS_GEN_ALL_ED_FT
[facility] Medicine Specialties at [place]  Internal Medicine  [address] [place] [address]  Phone: [phone]  Fax: [phone]    [facility] Pulmonolgy and Sleep Medicine  Pulmonology  [address] [place] [id number]  Phone: [phone]  Fax:

## 2023-04-07 NOTE — DISCHARGE NOTE PROVIDER - ATTENDING DISCHARGE PHYSICAL EXAMINATION:
GENERAL: NAD, elderly male resting in chair  HEAD:  Atraumatic, Normocephalic, MMM  CHEST/LUNG: No use of accessory muscles, CTA B/L, breathing non-labored  COR: RR, no m/r/c/g  ABD: Soft, ND/ NT, +BS  PSYCH: AAOx3  NEUROLOGY: CN II-XII grossly intact, moving all extremities  SKIN: No rashes or lesions  EXT: no LE edema noted B/L

## 2023-04-07 NOTE — DISCHARGE NOTE PROVIDER - NSDCFUSCHEDAPPT_GEN_ALL_CORE_FT
[facility]  VASCULAR [address] [facility]  Scheduled Appointment: 04/21/2023    Skip Zepeda  [facility]  VASCULAR [address] [facility]  Scheduled Appointment: 04/21/2023    Jason Laurent  [facility]  CARDIOLOGY [address] [facility]   Scheduled Appointment: 05/04/2023

## 2023-04-07 NOTE — DISCHARGE NOTE PROVIDER - NSDCFUADDAPPT_GEN_ALL_CORE_FT
Please follow up with PCP within one week of discharge.   Please follow up with cardiologist within one week.   Please follow up with endocrinologist and call the number provided to make an appointment.   Please follow up with pulmonologist within one week of discharge.

## 2023-04-07 NOTE — DISCHARGE NOTE PROVIDER - HOSPITAL COURSE
82 year old male, w/ Hx  DM2, Prostate cancer - s/p radiation seeds implant, and Meniere's disease, severe Pulmonary hypertension, Chronic lower extremities edema p/w  secondary to dyspnea on exertion.      Problem/Plan - 1:  ·  Problem: Acute on chronic diastolic congestive heart failure.   ·  Plan: -patient with cardiac amyloidosis, follows w/Dr Laurent  -pt states has not been taking some medications due to urinary incontinence when he takes them  -self discontinued Tafamidis on 01/01/2023, but restarted in ~ mid March  -TTE of 05/2022 significant for severe pulmonary hypertension, but per pulm notes, RHC w/o evidence of pHTN  -house cardiology consulted, patient was diuresed and ___  -TTE reviewed - concentric LVH, EF 53%     Problem/Plan - 2:  ·  Problem: Severe pulmonary hypertension.   ·  Plan: - noted on TTE of 05/2022, but per pulm notes, no pHTN noted on RHC  - cardiology consulted as above.   82 year old male, w/ Hx  DM2, Prostate cancer - s/p radiation seeds implant, and Meniere's disease, severe Pulmonary hypertension, Chronic lower extremities edema   presented with  dyspnea on exertion.   Admitted for further evaluation.     Pt with Acute on chronic diastolic congestive heart failure.   patient with cardiac amyloidosis and  follows with Dr Laurent.   not been taking medications due to urinary incontinence when he takes them  self discontinued Tafamidis on 01/01/2023, but restarted in ~ mid March  TTE of 05/2022 significant for severe pulmonary hypertension, but per pulm notes, RHC w/o evidence of pHTN  TTE reviewed - concentric LVH, EF 53%    Pt previously started on IV furosemide 40mg BID and transitioned to PO Lasix 40mg to 20mg daily.   Orthostatics ordered and were unremarkable. pt asymptomatic   house cardiology consulted and recommended continue diuretics and as per cardiology due to underlying Amyloidosis, pt will have low BP but it is acceptable if he is above SBP>90 and tolerating it.   started lisinopril at reduced dose (10mg) daily-> held due to low BP. Can restart if BP improves.   will need monitor intake/output, weight daily, fluid restriction of 1 liters daily  pt initiall yon supplemental oxygen and weaned off to RA  On admission pt had Otero placed and had OV on 4/11 and passed. Now voiding well and repeat bladder scan did not show pt was retaining urine.     Pt with Severe pulmonary hypertension.   pHTN noted on TTE of 05/2022, but per pulm notes, no pHTN noted on RHC   Cardiology consulted as above  pt will need outpt follow up with Pulm after discharge     Pt with Type 2 diabetes mellitus treated without insulin, HgbA1c 6.3%, on metformin PTA (held)  Continue Lantus/ ISS, per FS  Continue consistent carb diet  Monitoring FS and adjust as needed       Patient was seen and evaluated today. He reports feeling better and denies any acute complaints at this time. Pt states he wants to go to rehab so he can get stronger and then go home.   Discussed discharge medications, plan and outpatient follow up with patient. All questions answered and patient in agreement with discharge plan.   Patient is hemodynamically stable for discharge with outpatient follow up with PCP, Cardiology, Pulmonology and Endocrinology.

## 2023-04-07 NOTE — DISCHARGE NOTE PROVIDER - NSDCMRMEDTOKEN_GEN_ALL_CORE_FT
furosemide 40 mg oral tablet: 1 tab(s) orally once a day (Dispensed Nov/2022 x 90 days)?  gabapentin 100 mg oral capsule: 1 tab(s) orally once a day (at bedtime)  lisinopril 20 mg oral tablet: 1 tab(s) orally once a day  meloxicam 15 mg oral tablet: 1 tab(s) orally once a day  metFORMIN 500 mg oral tablet: 1 tab(s) orally 2 times a day  omeprazole 20 mg oral delayed release capsule: 1 tab(s) orally once a day  prednisoLONE acetate 1% ophthalmic suspension: 1 in the left eye 4 times a day  simvastatin 20 mg oral tablet: 1 tab(s) orally once a day (at bedtime)  Vyndamax 61 mg oral capsule: 1 orally once a day   furosemide 20 mg oral tablet: 1 tab(s) orally once a day  gabapentin 100 mg oral capsule: 1 tab(s) orally once a day (at bedtime)  insulin glargine 100 units/mL subcutaneous solution: 3 unit(s) subcutaneous once a day (at bedtime)  omeprazole 20 mg oral delayed release capsule: 1 tab(s) orally once a day  prednisoLONE acetate 1% ophthalmic suspension: 1 in the left eye 4 times a day  simvastatin 20 mg oral tablet: 1 tab(s) orally once a day (at bedtime)  Vyndamax 61 mg oral capsule: 1 orally once a day   furosemide 20 mg oral tablet: 1 tab(s) orally once a day increase to 40mg daily if BP tolerates  gabapentin 100 mg oral capsule: 1 tab(s) orally once a day (at bedtime)  insulin glargine 100 units/mL subcutaneous solution: 3 unit(s) subcutaneous once a day (at bedtime)  omeprazole 20 mg oral delayed release capsule: 1 tab(s) orally once a day  prednisoLONE acetate 1% ophthalmic suspension: 1 in the left eye 4 times a day  simvastatin 20 mg oral tablet: 1 tab(s) orally once a day (at bedtime)  Vyndamax 61 mg oral capsule: 1 orally once a day

## 2023-04-07 NOTE — PROGRESS NOTE ADULT - NS ATTEST RISK PROBLEM GEN_ALL_CORE FT
Pedro Jurado is an 82-year-old man with history of cardiac amyloidosis, pHTN, DMII and prostate ca (s/p radiation seeds). He presented with dyspnea on exertion due to ADHF (acute HFpEF) in the setting of cardiac amyloidosis, (followed by Dr. Laurent at [facility]). TTE 4/6/23 noted Low normal left ventricular systolic function.  Severe concentric left ventricular hypertrophy. LVEF 53%. Normal left ventricular diastolic function. Nevertheless, there is evidence of volume overload on examination. Tremfya Pregnancy And Lactation Text: The risk during pregnancy and breastfeeding is uncertain with this medication. Cimetidine Counseling:  I discussed with the patient the risks of Cimetidine including but not limited to gynecomastia, headache, diarrhea, nausea, drowsiness, arrhythmias, pancreatitis, skin rashes, psychosis, bone marrow suppression and kidney toxicity. Opioid Pregnancy And Lactation Text: These medications can lead to premature delivery and should be avoided during pregnancy. These medications are also present in breast milk in small amounts. Rhofade Counseling: Rhofade is a topical medication which can decrease superficial blood flow where applied. Side effects are uncommon and include stinging, redness and allergic reactions. Sski Pregnancy And Lactation Text: This medication is Pregnancy Category D and isn't considered safe during pregnancy. It is excreted in breast milk. Hydroxychloroquine Pregnancy And Lactation Text: This medication has been shown to cause fetal harm but it isn't assigned a Pregnancy Risk Category. There are small amounts excreted in breast milk. Dupixent Pregnancy And Lactation Text: This medication likely crosses the placenta but the risk for the fetus is uncertain. This medication is excreted in breast milk. Acitretin Counseling:  I discussed with the patient the risks of acitretin including but not limited to hair loss, dry lips/skin/eyes, liver damage, hyperlipidemia, depression/suicidal ideation, photosensitivity.  Serious rare side effects can include but are not limited to pancreatitis, pseudotumor cerebri, bony changes, clot formation/stroke/heart attack.  Patient understands that alcohol is contraindicated since it can result in liver toxicity and significantly prolong the elimination of the drug by many years. Azithromycin Counseling:  I discussed with the patient the risks of azithromycin including but not limited to GI upset, allergic reaction, drug rash, diarrhea, and yeast infections. Rifampin Counseling: I discussed with the patient the risks of rifampin including but not limited to liver damage, kidney damage, red-orange body fluids, nausea/vomiting and severe allergy. Drysol Counseling:  I discussed with the patient the risks of drysol/aluminum chloride including but not limited to skin rash, itching, irritation, burning. Azithromycin Pregnancy And Lactation Text: This medication is considered safe during pregnancy and is also secreted in breast milk. Xeljanz Counseling: I discussed with the patient the risks of Xeljanz therapy including increased risk of infection, liver issues, headache, diarrhea, or cold symptoms. Live vaccines should be avoided. They were instructed to call if they have any problems. Rifampin Pregnancy And Lactation Text: This medication is Pregnancy Category C and it isn't know if it is safe during pregnancy. It is also excreted in breast milk and should not be used if you are breast feeding. Niacinamide Counseling: I recommended taking niacin or niacinamide, also know as vitamin B3, twice daily. Recent evidence suggests that taking vitamin B3 (500 mg twice daily) can reduce the risk of actinic keratoses and non-melanoma skin cancers. Side effects of vitamin B3 include flushing and headache. Cimetidine Pregnancy And Lactation Text: This medication is Pregnancy Category B and is considered safe during pregnancy. It is also excreted in breast milk and breast feeding isn't recommended. Ilumya Counseling: I discussed with the patient the risks of tildrakizumab including but not limited to immunosuppression, malignancy, posterior leukoencephalopathy syndrome, and serious infections.  The patient understands that monitoring is required including a PPD at baseline and must alert us or the primary physician if symptoms of infection or other concerning signs are noted. Rhofade Pregnancy And Lactation Text: This medication has not been assigned a Pregnancy Risk Category. It is unknown if the medication is excreted in breast milk. Drysol Pregnancy And Lactation Text: This medication is considered safe during pregnancy and breast feeding. Doxepin Counseling:  Patient advised that the medication is sedating and not to drive a car after taking this medication. Patient informed of potential adverse effects including but not limited to dry mouth, urinary retention, and blurry vision.  The patient verbalized understanding of the proper use and possible adverse effects of doxepin.  All of the patient's questions and concerns were addressed. Acitretin Pregnancy And Lactation Text: This medication is Pregnancy Category X and should not be given to women who are pregnant or may become pregnant in the future. This medication is excreted in breast milk. Enbrel Counseling:  I discussed with the patient the risks of etanercept including but not limited to myelosuppression, immunosuppression, autoimmune hepatitis, demyelinating diseases, lymphoma, and infections.  The patient understands that monitoring is required including a PPD at baseline and must alert us or the primary physician if symptoms of infection or other concerning signs are noted. Arava Counseling:  Patient counseled regarding adverse effects of Arava including but not limited to nausea, vomiting, abnormalities in liver function tests. Patients may develop mouth sores, rash, diarrhea, and abnormalities in blood counts. The patient understands that monitoring is required including LFTs and blood counts.  There is a rare possibility of scarring of the liver and lung problems that can occur when taking methotrexate. Persistent nausea, loss of appetite, pale stools, dark urine, cough, and shortness of breath should be reported immediately. Patient advised to discontinue Arava treatment and consult with a physician prior to attempting conception. The patient will have to undergo a treatment to eliminate Arava from the body prior to conception. Xelmargarethz Pregnancy And Lactation Text: This medication is Pregnancy Category D and is not considered safe during pregnancy.  The risk during breast feeding is also uncertain. Enbrel Pregnancy And Lactation Text: This medication is Pregnancy Category B and is considered safe during pregnancy. It is unknown if this medication is excreted in breast milk. Solaraze Counseling:  I discussed with the patient the risks of Solaraze including but not limited to erythema, scaling, itching, weeping, crusting, and pain. Elidel Counseling: Patient may experience a mild burning sensation during topical application. Elidel is not approved in children less than 2 years of age. There have been case reports of hematologic and skin malignancies in patients using topical calcineurin inhibitors although causality is questionable. Bactrim Counseling:  I discussed with the patient the risks of sulfa antibiotics including but not limited to GI upset, allergic reaction, drug rash, diarrhea, dizziness, photosensitivity, and yeast infections.  Rarely, more serious reactions can occur including but not limited to aplastic anemia, agranulocytosis, methemoglobinemia, blood dyscrasias, liver or kidney failure, lung infiltrates or desquamative/blistering drug rashes. Sarecycline Counseling: Patient advised regarding possible photosensitivity and discoloration of the teeth, skin, lips, tongue and gums.  Patient instructed to avoid sunlight, if possible.  When exposed to sunlight, patients should wear protective clothing, sunglasses, and sunscreen.  The patient was instructed to call the office immediately if the following severe adverse effects occur:  hearing changes, easy bruising/bleeding, severe headache, or vision changes.  The patient verbalized understanding of the proper use and possible adverse effects of sarecycline.  All of the patient's questions and concerns were addressed. Niacinamide Pregnancy And Lactation Text: These medications are considered safe during pregnancy. Xolair Counseling:  Patient informed of potential adverse effects including but not limited to fever, muscle aches, rash and allergic reactions.  The patient verbalized understanding of the proper use and possible adverse effects of Xolair.  All of the patient's questions and concerns were addressed. Doxepin Pregnancy And Lactation Text: This medication is Pregnancy Category C and it isn't known if it is safe during pregnancy. It is also excreted in breast milk and breast feeding isn't recommended. Bexarotene Counseling:  I discussed with the patient the risks of bexarotene including but not limited to hair loss, dry lips/skin/eyes, liver abnormalities, hyperlipidemia, pancreatitis, depression/suicidal ideation, photosensitivity, drug rash/allergic reactions, hypothyroidism, anemia, leukopenia, infection, cataracts, and teratogenicity.  Patient understands that they will need regular blood tests to check lipid profile, liver function tests, white blood cell count, thyroid function tests and pregnancy test if applicable. Arava Pregnancy And Lactation Text: This medication is Pregnancy Category X and is absolutely contraindicated during pregnancy. It is unknown if it is excreted in breast milk. Solaraze Pregnancy And Lactation Text: This medication is Pregnancy Category B and is considered safe. There is some data to suggest avoiding during the third trimester. It is unknown if this medication is excreted in breast milk. Infliximab Counseling:  I discussed with the patient the risks of infliximab including but not limited to myelosuppression, immunosuppression, autoimmune hepatitis, demyelinating diseases, lymphoma, and serious infections.  The patient understands that monitoring is required including a PPD at baseline and must alert us or the primary physician if symptoms of infection or other concerning signs are noted. Bactrim Pregnancy And Lactation Text: This medication is Pregnancy Category D and is known to cause fetal risk.  It is also excreted in breast milk. Sarecycline Pregnancy And Lactation Text: This medication is Pregnancy Category D and not consider safe during pregnancy. It is also excreted in breast milk. Clofazimine Counseling:  I discussed with the patient the risks of clofazimine including but not limited to skin and eye pigmentation, liver damage, nausea/vomiting, gastrointestinal bleeding and allergy. Elidel Pregnancy And Lactation Text: This medication is Pregnancy Category C. It is unknown if this medication is excreted in breast milk. Tetracycline Counseling: Patient counseled regarding possible photosensitivity and increased risk for sunburn.  Patient instructed to avoid sunlight, if possible.  When exposed to sunlight, patients should wear protective clothing, sunglasses, and sunscreen.  The patient was instructed to call the office immediately if the following severe adverse effects occur:  hearing changes, easy bruising/bleeding, severe headache, or vision changes.  The patient verbalized understanding of the proper use and possible adverse effects of tetracycline.  All of the patient's questions and concerns were addressed. Patient understands to avoid pregnancy while on therapy due to potential birth defects. Nsaids Counseling: NSAID Counseling: I discussed with the patient that NSAIDs should be taken with food. Prolonged use of NSAIDs can result in the development of stomach ulcers.  Patient advised to stop taking NSAIDs if abdominal pain occurs.  The patient verbalized understanding of the proper use and possible adverse effects of NSAIDs.  All of the patient's questions and concerns were addressed. Bexarotene Pregnancy And Lactation Text: This medication is Pregnancy Category X and should not be given to women who are pregnant or may become pregnant. This medication should not be used if you are breast feeding. Hydroxyzine Counseling: Patient advised that the medication is sedating and not to drive a car after taking this medication.  Patient informed of potential adverse effects including but not limited to dry mouth, urinary retention, and blurry vision.  The patient verbalized understanding of the proper use and possible adverse effects of hydroxyzine.  All of the patient's questions and concerns were addressed. Xolair Pregnancy And Lactation Text: This medication is Pregnancy Category B and is considered safe during pregnancy. This medication is excreted in breast milk. Topical Retinoid counseling:  Patient advised to apply a pea-sized amount only at bedtime and wait 30 minutes after washing their face before applying.  If too drying, patient may add a non-comedogenic moisturizer. The patient verbalized understanding of the proper use and possible adverse effects of retinoids.  All of the patient's questions and concerns were addressed. Humira Counseling:  I discussed with the patient the risks of adalimumab including but not limited to myelosuppression, immunosuppression, autoimmune hepatitis, demyelinating diseases, lymphoma, and serious infections.  The patient understands that monitoring is required including a PPD at baseline and must alert us or the primary physician if symptoms of infection or other concerning signs are noted. Eucrisa Counseling: Patient may experience a mild burning sensation during topical application. Eucrisa is not approved in children less than 2 years of age. Hydroxyzine Pregnancy And Lactation Text: This medication is not safe during pregnancy and should not be taken. It is also excreted in breast milk and breast feeding isn't recommended. Isotretinoin Counseling: Patient should get monthly blood tests, not donate blood, not drive at night if vision affected, not share medication, and not undergo elective surgery for 6 months after tx completed. Side effects reviewed, pt to contact office should one occur. Nsaids Pregnancy And Lactation Text: These medications are considered safe up to 30 weeks gestation. It is excreted in breast milk. Cephalexin Counseling: I counseled the patient regarding use of cephalexin as an antibiotic for prophylactic and/or therapeutic purposes. Cephalexin (commonly prescribed under brand name Keflex) is a cephalosporin antibiotic which is active against numerous classes of bacteria, including most skin bacteria. Side effects may include nausea, diarrhea, gastrointestinal upset, rash, hives, yeast infections, and in rare cases, hepatitis, kidney disease, seizures, fever, confusion, neurologic symptoms, and others. Patients with severe allergies to penicillin medications are cautioned that there is about a 10% incidence of cross-reactivity with cephalosporins. When possible, patients with penicillin allergies should use alternatives to cephalosporins for antibiotic therapy. Rituxan Counseling:  I discussed with the patient the risks of Rituxan infusions. Side effects can include infusion reactions, severe drug rashes including mucocutaneous reactions, reactivation of latent hepatitis and other infections and rarely progressive multifocal leukoencephalopathy.  All of the patient's questions and concerns were addressed. Clofazimine Pregnancy And Lactation Text: This medication is Pregnancy Category C and isn't considered safe during pregnancy. It is excreted in breast milk. Eucrisa Pregnancy And Lactation Text: This medication has not been assigned a Pregnancy Risk Category but animal studies failed to show danger with the topical medication. It is unknown if the medication is excreted in breast milk. Odomzo Counseling- I discussed with the patient the risks of Odomzo including but not limited to nausea, vomiting, diarrhea, constipation, weight loss, changes in the sense of taste, decreased appetite, muscle spasms, and hair loss.  The patient verbalized understanding of the proper use and possible adverse effects of Odomzo.  All of the patient's questions and concerns were addressed. Isotretinoin Pregnancy And Lactation Text: This medication is Pregnancy Category X and is considered extremely dangerous during pregnancy. It is unknown if it is excreted in breast milk. Cephalexin Pregnancy And Lactation Text: This medication is Pregnancy Category B and considered safe during pregnancy.  It is also excreted in breast milk but can be used safely for shorter doses. Tazorac Counseling:  Patient advised that medication is irritating and drying.  Patient may need to apply sparingly and wash off after an hour before eventually leaving it on overnight.  The patient verbalized understanding of the proper use and possible adverse effects of tazorac.  All of the patient's questions and concerns were addressed. Rituxan Pregnancy And Lactation Text: This medication is Pregnancy Category C and it isn't know if it is safe during pregnancy. It is unknown if this medication is excreted in breast milk but similar antibodies are known to be excreted. Hydroquinone Counseling:  Patient advised that medication may result in skin irritation, lightening (hypopigmentation), dryness, and burning.  In the event of skin irritation, the patient was advised to reduce the amount of the drug applied or use it less frequently.  Rarely, spots that are treated with hydroquinone can become darker (pseudoochronosis).  Should this occur, patient instructed to stop medication and call the office. The patient verbalized understanding of the proper use and possible adverse effects of hydroquinone.  All of the patient's questions and concerns were addressed. Clindamycin Counseling: I counseled the patient regarding use of clindamycin as an antibiotic for prophylactic and/or therapeutic purposes. Clindamycin is active against numerous classes of bacteria, including skin bacteria. Side effects may include nausea, diarrhea, gastrointestinal upset, rash, hives, yeast infections, and in rare cases, colitis. Azathioprine Counseling:  I discussed with the patient the risks of azathioprine including but not limited to myelosuppression, immunosuppression, hepatotoxicity, lymphoma, and infections.  The patient understands that monitoring is required including baseline LFTs, Creatinine, possible TPMP genotyping and weekly CBCs for the first month and then every 2 weeks thereafter.  The patient verbalized understanding of the proper use and possible adverse effects of azathioprine.  All of the patient's questions and concerns were addressed. Colchicine Counseling:  Patient counseled regarding adverse effects including but not limited to stomach upset (nausea, vomiting, stomach pain, or diarrhea).  Patient instructed to limit alcohol consumption while taking this medication.  Colchicine may reduce blood counts especially with prolonged use.  The patient understands that monitoring of kidney function and blood counts may be required, especially at baseline. The patient verbalized understanding of the proper use and possible adverse effects of colchicine.  All of the patient's questions and concerns were addressed. Thalidomide Counseling: I discussed with the patient the risks of thalidomide including but not limited to birth defects, anxiety, weakness, chest pain, dizziness, cough and severe allergy. Albendazole Counseling:  I discussed with the patient the risks of albendazole including but not limited to cytopenia, kidney damage, nausea/vomiting and severe allergy.  The patient understands that this medication is being used in an off-label manner. High Dose Vitamin A Counseling: Side effects reviewed, pt to contact office should one occur. Fluconazole Counseling:  Patient counseled regarding adverse effects of fluconazole including but not limited to headache, diarrhea, nausea, upset stomach, liver function test abnormalities, taste disturbance, and stomach pain.  There is a rare possibility of liver failure that can occur when taking fluconazole.  The patient understands that monitoring of LFTs and kidney function test may be required, especially at baseline. The patient verbalized understanding of the proper use and possible adverse effects of fluconazole.  All of the patient's questions and concerns were addressed. Azathioprine Pregnancy And Lactation Text: This medication is Pregnancy Category D and isn't considered safe during pregnancy. It is unknown if this medication is excreted in breast milk. Siliq Counseling:  I discussed with the patient the risks of Siliq including but not limited to new or worsening depression, suicidal thoughts and behavior, immunosuppression, malignancy, posterior leukoencephalopathy syndrome, and serious infections.  The patient understands that monitoring is required including a PPD at baseline and must alert us or the primary physician if symptoms of infection or other concerning signs are noted. There is also a special program designed to monitor depression which is required with Siliq. Tazorac Pregnancy And Lactation Text: This medication is not safe during pregnancy. It is unknown if this medication is excreted in breast milk. High Dose Vitamin A Pregnancy And Lactation Text: High dose vitamin A therapy is contraindicated during pregnancy and breast feeding. Otezla Counseling: The side effects of Otezla were discussed with the patient, including but not limited to worsening or new depression, weight loss, diarrhea, nausea, upper respiratory tract infection, and headache. Patient instructed to call the office should any adverse effect occur.  The patient verbalized understanding of the proper use and possible adverse effects of Otezla.  All the patient's questions and concerns were addressed. Clindamycin Pregnancy And Lactation Text: This medication can be used in pregnancy if certain situations. Clindamycin is also present in breast milk. Dapsone Counseling: I discussed with the patient the risks of dapsone including but not limited to hemolytic anemia, agranulocytosis, rashes, methemoglobinemia, kidney failure, peripheral neuropathy, headaches, GI upset, and liver toxicity.  Patients who start dapsone require monitoring including baseline LFTs and weekly CBCs for the first month, then every month thereafter.  The patient verbalized understanding of the proper use and possible adverse effects of dapsone.  All of the patient's questions and concerns were addressed. Cellcept Counseling:  I discussed with the patient the risks of mycophenolate mofetil including but not limited to infection/immunosuppression, GI upset, hypokalemia, hypercholesterolemia, bone marrow suppression, lymphoproliferative disorders, malignancy, GI ulceration/bleed/perforation, colitis, interstitial lung disease, kidney failure, progressive multifocal leukoencephalopathy, and birth defects.  The patient understands that monitoring is required including a baseline creatinine and regular CBC testing. In addition, patient must alert us immediately if symptoms of infection or other concerning signs are noted. Albendazole Pregnancy And Lactation Text: This medication is Pregnancy Category C and it isn't known if it is safe during pregnancy. It is also excreted in breast milk. Tranexamic Acid Counseling:  Patient advised of the small risk of bleeding problems with tranexamic acid. They were also instructed to call if they developed any nausea, vomiting or diarrhea. All of the patient's questions and concerns were addressed. Topical Clindamycin Counseling: Patient counseled that this medication may cause skin irritation or allergic reactions.  In the event of skin irritation, the patient was advised to reduce the amount of the drug applied or use it less frequently.   The patient verbalized understanding of the proper use and possible adverse effects of clindamycin.  All of the patient's questions and concerns were addressed. Otezla Pregnancy And Lactation Text: This medication is Pregnancy Category C and it isn't known if it is safe during pregnancy. It is unknown if it is excreted in breast milk. Dapsone Pregnancy And Lactation Text: This medication is Pregnancy Category C and is not considered safe during pregnancy or breast feeding. Doxycycline Counseling:  Patient counseled regarding possible photosensitivity and increased risk for sunburn.  Patient instructed to avoid sunlight, if possible.  When exposed to sunlight, patients should wear protective clothing, sunglasses, and sunscreen.  The patient was instructed to call the office immediately if the following severe adverse effects occur:  hearing changes, easy bruising/bleeding, severe headache, or vision changes.  The patient verbalized understanding of the proper use and possible adverse effects of doxycycline.  All of the patient's questions and concerns were addressed. Fluconazole Pregnancy And Lactation Text: This medication is Pregnancy Category C and it isn't know if it is safe during pregnancy. It is also excreted in breast milk. Imiquimod Counseling:  I discussed with the patient the risks of imiquimod including but not limited to erythema, scaling, itching, weeping, crusting, and pain.  Patient understands that the inflammatory response to imiquimod is variable from person to person and was educated regarded proper titration schedule.  If flu-like symptoms develop, patient knows to discontinue the medication and contact us. Doxycycline Pregnancy And Lactation Text: This medication is Pregnancy Category D and not consider safe during pregnancy. It is also excreted in breast milk but is considered safe for shorter treatment courses. Griseofulvin Counseling:  I discussed with the patient the risks of griseofulvin including but not limited to photosensitivity, cytopenia, liver damage, nausea/vomiting and severe allergy.  The patient understands that this medication is best absorbed when taken with a fatty meal (e.g., ice cream or french fries). Ivermectin Counseling:  Patient instructed to take medication on an empty stomach with a full glass of water.  Patient informed of potential adverse effects including but not limited to nausea, diarrhea, dizziness, itching, and swelling of the extremities or lymph nodes.  The patient verbalized understanding of the proper use and possible adverse effects of ivermectin.  All of the patient's questions and concerns were addressed. Tranexamic Acid Pregnancy And Lactation Text: It is unknown if this medication is safe during pregnancy or breast feeding. Topical Clindamycin Pregnancy And Lactation Text: This medication is Pregnancy Category B and is considered safe during pregnancy. It is unknown if it is excreted in breast milk. Simponi Counseling:  I discussed with the patient the risks of golimumab including but not limited to myelosuppression, immunosuppression, autoimmune hepatitis, demyelinating diseases, lymphoma, and serious infections.  The patient understands that monitoring is required including a PPD at baseline and must alert us or the primary physician if symptoms of infection or other concerning signs are noted. Oxybutynin Counseling:  I discussed with the patient the risks of oxybutynin including but not limited to skin rash, drowsiness, dry mouth, difficulty urinating, and blurred vision. Erivedge Counseling- I discussed with the patient the risks of Erivedge including but not limited to nausea, vomiting, diarrhea, constipation, weight loss, changes in the sense of taste, decreased appetite, muscle spasms, and hair loss.  The patient verbalized understanding of the proper use and possible adverse effects of Erivedge.  All of the patient's questions and concerns were addressed. Cyclophosphamide Counseling:  I discussed with the patient the risks of cyclophosphamide including but not limited to hair loss, hormonal abnormalities, decreased fertility, abdominal pain, diarrhea, nausea and vomiting, bone marrow suppression and infection. The patient understands that monitoring is required while taking this medication. Griseofulvin Pregnancy And Lactation Text: This medication is Pregnancy Category X and is known to cause serious birth defects. It is unknown if this medication is excreted in breast milk but breast feeding should be avoided. Topical Sulfur Applications Counseling: Topical Sulfur Counseling: Patient counseled that this medication may cause skin irritation or allergic reactions.  In the event of skin irritation, the patient was advised to reduce the amount of the drug applied or use it less frequently.   The patient verbalized understanding of the proper use and possible adverse effects of topical sulfur application.  All of the patient's questions and concerns were addressed. Minoxidil Counseling: Minoxidil is a topical medication which can increase blood flow where it is applied. It is uncertain how this medication increases hair growth. Side effects are uncommon and include stinging and allergic reactions. Valtrex Counseling: I discussed with the patient the risks of valacyclovir including but not limited to kidney damage, nausea, vomiting and severe allergy.  The patient understands that if the infection seems to be worsening or is not improving, they are to call. Erythromycin Counseling:  I discussed with the patient the risks of erythromycin including but not limited to GI upset, allergic reaction, drug rash, diarrhea, increase in liver enzymes, and yeast infections. Cyclophosphamide Pregnancy And Lactation Text: This medication is Pregnancy Category D and it isn't considered safe during pregnancy. This medication is excreted in breast milk. Valtrex Pregnancy And Lactation Text: this medication is Pregnancy Category B and is considered safe during pregnancy. This medication is not directly found in breast milk but it's metabolite acyclovir is present. Topical Sulfur Applications Pregnancy And Lactation Text: This medication is Pregnancy Category C and has an unknown safety profile during pregnancy. It is unknown if this topical medication is excreted in breast milk. Skyrizi Counseling: I discussed with the patient the risks of risankizumab-rzaa including but not limited to immunosuppression, and serious infections.  The patient understands that monitoring is required including a PPD at baseline and must alert us or the primary physician if symptoms of infection or other concerning signs are noted. Benzoyl Peroxide Counseling: Patient counseled that medicine may cause skin irritation and bleach clothing.  In the event of skin irritation, the patient was advised to reduce the amount of the drug applied or use it less frequently.   The patient verbalized understanding of the proper use and possible adverse effects of benzoyl peroxide.  All of the patient's questions and concerns were addressed. Erythromycin Pregnancy And Lactation Text: This medication is Pregnancy Category B and is considered safe during pregnancy. It is also excreted in breast milk. Finasteride Counseling:  I discussed with the patient the risks of use of finasteride including but not limited to decreased libido, decreased ejaculate volume, gynecomastia, and depression. Women should not handle medication.  All of the patient's questions and concerns were addressed. Itraconazole Counseling:  I discussed with the patient the risks of itraconazole including but not limited to liver damage, nausea/vomiting, neuropathy, and severe allergy.  The patient understands that this medication is best absorbed when taken with acidic beverages such as non-diet cola or ginger ale.  The patient understands that monitoring is required including baseline LFTs and repeat LFTs at intervals.  The patient understands that they are to contact us or the primary physician if concerning signs are noted. Benzoyl Peroxide Pregnancy And Lactation Text: This medication is Pregnancy Category C. It is unknown if benzoyl peroxide is excreted in breast milk. Propranolol Counseling:  I discussed with the patient the risks of propranolol including but not limited to low heart rate, low blood pressure, low blood sugar, restlessness and increased cold sensitivity. They should call the office if they experience any of these side effects. Cyclosporine Counseling:  I discussed with the patient the risks of cyclosporine including but not limited to hypertension, gingival hyperplasia,myelosuppression, immunosuppression, liver damage, kidney damage, neurotoxicity, lymphoma, and serious infections. The patient understands that monitoring is required including baseline blood pressure, CBC, CMP, lipid panel and uric acid, and then 1-2 times monthly CMP and blood pressure. Use Enhanced Medication Counseling?: No Wartpeel Counseling:  I discussed with the patient the risks of Wartpeel including but not limited to erythema, scaling, itching, weeping, crusting, and pain. Mirvaso Counseling: Mirvaso is a topical medication which can decrease superficial blood flow where applied. Side effects are uncommon and include stinging, redness and allergic reactions. Carac Counseling:  I discussed with the patient the risks of Carac including but not limited to erythema, scaling, itching, weeping, crusting, and pain. Propranolol Pregnancy And Lactation Text: This medication is Pregnancy Category C and it isn't known if it is safe during pregnancy. It is excreted in breast milk. Detail Level: Simple Metronidazole Counseling:  I discussed with the patient the risks of metronidazole including but not limited to seizures, nausea/vomiting, a metallic taste in the mouth, nausea/vomiting and severe allergy. Finasteride Pregnancy And Lactation Text: This medication is absolutely contraindicated during pregnancy. It is unknown if it is excreted in breast milk. Stelara Counseling:  I discussed with the patient the risks of ustekinumab including but not limited to immunosuppression, malignancy, posterior leukoencephalopathy syndrome, and serious infections.  The patient understands that monitoring is required including a PPD at baseline and must alert us or the primary physician if symptoms of infection or other concerning signs are noted. Cyclosporine Pregnancy And Lactation Text: This medication is Pregnancy Category C and it isn't know if it is safe during pregnancy. This medication is excreted in breast milk. Wartpeel Pregnancy And Lactation Text: This medication is Pregnancy Category X and contraindicated in pregnancy and in women who may become pregnant. It is unknown if this medication is excreted in breast milk. Ketoconazole Counseling:   Patient counseled regarding improving absorption with orange juice.  Adverse effects include but are not limited to breast enlargement, headache, diarrhea, nausea, upset stomach, liver function test abnormalities, taste disturbance, and stomach pain.  There is a rare possibility of liver failure that can occur when taking ketoconazole. The patient understands that monitoring of LFTs may be required, especially at baseline. The patient verbalized understanding of the proper use and possible adverse effects of ketoconazole.  All of the patient's questions and concerns were addressed. Birth Control Pills Counseling: Birth Control Pill Counseling: I discussed with the patient the potential side effects of OCPs including but not limited to increased risk of stroke, heart attack, thrombophlebitis, deep venous thrombosis, hepatic adenomas, breast changes, GI upset, headaches, and depression.  The patient verbalized understanding of the proper use and possible adverse effects of OCPs. All of the patient's questions and concerns were addressed. Metronidazole Pregnancy And Lactation Text: This medication is Pregnancy Category B and considered safe during pregnancy.  It is also excreted in breast milk. Zyclara Counseling:  I discussed with the patient the risks of imiquimod including but not limited to erythema, scaling, itching, weeping, crusting, and pain.  Patient understands that the inflammatory response to imiquimod is variable from person to person and was educated regarded proper titration schedule.  If flu-like symptoms develop, patient knows to discontinue the medication and contact us. Methotrexate Counseling:  Patient counseled regarding adverse effects of methotrexate including but not limited to nausea, vomiting, abnormalities in liver function tests. Patients may develop mouth sores, rash, diarrhea, and abnormalities in blood counts. The patient understands that monitoring is required including LFT's and blood counts.  There is a rare possibility of scarring of the liver and lung problems that can occur when taking methotrexate. Persistent nausea, loss of appetite, pale stools, dark urine, cough, and shortness of breath should be reported immediately. Patient advised to discontinue methotrexate treatment at least three months before attempting to become pregnant.  I discussed the need for folate supplements while taking methotrexate.  These supplements can decrease side effects during methotrexate treatment. The patient verbalized understanding of the proper use and possible adverse effects of methotrexate.  All of the patient's questions and concerns were addressed. Minocycline Counseling: Patient advised regarding possible photosensitivity and discoloration of the teeth, skin, lips, tongue and gums.  Patient instructed to avoid sunlight, if possible.  When exposed to sunlight, patients should wear protective clothing, sunglasses, and sunscreen.  The patient was instructed to call the office immediately if the following severe adverse effects occur:  hearing changes, easy bruising/bleeding, severe headache, or vision changes.  The patient verbalized understanding of the proper use and possible adverse effects of minocycline.  All of the patient's questions and concerns were addressed. Gabapentin Counseling: I discussed with the patient the risks of gabapentin including but not limited to dizziness, somnolence, fatigue and ataxia. Ketoconazole Pregnancy And Lactation Text: This medication is Pregnancy Category C and it isn't know if it is safe during pregnancy. It is also excreted in breast milk and breast feeding isn't recommended. Cimzia Counseling:  I discussed with the patient the risks of Cimzia including but not limited to immunosuppression, allergic reactions and infections.  The patient understands that monitoring is required including a PPD at baseline and must alert us or the primary physician if symptoms of infection or other concerning signs are noted. Picato Counseling:  I discussed with the patient the risks of Picato including but not limited to erythema, scaling, itching, weeping, crusting, and pain. Calcipotriene Counseling:  I discussed with the patient the risks of calcipotriene including but not limited to erythema, scaling, itching, and irritation. Cimzia Pregnancy And Lactation Text: This medication crosses the placenta but can be considered safe in certain situations. Cimzia may be excreted in breast milk. Birth Control Pills Pregnancy And Lactation Text: This medication should be avoided if pregnant and for the first 30 days post-partum. Methotrexate Pregnancy And Lactation Text: This medication is Pregnancy Category X and is known to cause fetal harm. This medication is excreted in breast milk. Terbinafine Counseling: Patient counseling regarding adverse effects of terbinafine including but not limited to headache, diarrhea, rash, upset stomach, liver function test abnormalities, itching, taste/smell disturbance, nausea, abdominal pain, and flatulence.  There is a rare possibility of liver failure that can occur when taking terbinafine.  The patient understands that a baseline LFT and kidney function test may be required. The patient verbalized understanding of the proper use and possible adverse effects of terbinafine.  All of the patient's questions and concerns were addressed. Taltz Counseling: I discussed with the patient the risks of ixekizumab including but not limited to immunosuppression, serious infections, worsening of inflammatory bowel disease and drug reactions.  The patient understands that monitoring is required including a PPD at baseline and must alert us or the primary physician if symptoms of infection or other concerning signs are noted. Calcipotriene Pregnancy And Lactation Text: This medication has not been proven safe during pregnancy. It is unknown if this medication is excreted in breast milk. Spironolactone Counseling: Patient advised regarding risks of diarrhea, abdominal pain, hyperkalemia, birth defects (for female patients), liver toxicity and renal toxicity. The patient may need blood work to monitor liver and kidney function and potassium levels while on therapy. The patient verbalized understanding of the proper use and possible adverse effects of spironolactone.  All of the patient's questions and concerns were addressed. Glycopyrrolate Counseling:  I discussed with the patient the risks of glycopyrrolate including but not limited to skin rash, drowsiness, dry mouth, difficulty urinating, and blurred vision. Cosentyx Counseling:  I discussed with the patient the risks of Cosentyx including but not limited to worsening of Crohn's disease, immunosuppression, allergic reactions and infections.  The patient understands that monitoring is required including a PPD at baseline and must alert us or the primary physician if symptoms of infection or other concerning signs are noted. Prednisone Counseling:  I discussed with the patient the risks of prolonged use of prednisone including but not limited to weight gain, insomnia, osteoporosis, mood changes, diabetes, susceptibility to infection, glaucoma and high blood pressure.  In cases where prednisone use is prolonged, patients should be monitored with blood pressure checks, serum glucose levels and an eye exam.  Additionally, the patient may need to be placed on GI prophylaxis, PCP prophylaxis, and calcium and vitamin D supplementation and/or a bisphosphonate.  The patient verbalized understanding of the proper use and the possible adverse effects of prednisone.  All of the patient's questions and concerns were addressed. 5-Fu Counseling: 5-Fluorouracil Counseling:  I discussed with the patient the risks of 5-fluorouracil including but not limited to erythema, scaling, itching, weeping, crusting, and pain. Spironolactone Pregnancy And Lactation Text: This medication can cause feminization of the male fetus and should be avoided during pregnancy. The active metabolite is also found in breast milk. Quinolones Counseling:  I discussed with the patient the risks of fluoroquinolones including but not limited to GI upset, allergic reaction, drug rash, diarrhea, dizziness, photosensitivity, yeast infections, liver function test abnormalities, tendonitis/tendon rupture. Protopic Counseling: Patient may experience a mild burning sensation during topical application. Protopic is not approved in children less than 2 years of age. There have been case reports of hematologic and skin malignancies in patients using topical calcineurin inhibitors although causality is questionable. Glycopyrrolate Pregnancy And Lactation Text: This medication is Pregnancy Category B and is considered safe during pregnancy. It is unknown if it is excreted breast milk. Opioid Counseling: I discussed with the patient the potential side effects of opioids including but not limited to addiction, altered mental status, and depression. I stressed avoiding alcohol, benzodiazepines, muscle relaxants and sleep aids unless specifically okayed by a physician. The patient verbalized understanding of the proper use and possible adverse effects of opioids. All of the patient's questions and concerns were addressed. They were instructed to flush the remaining pills down the toilet if they did not need them for pain. Tremfya Counseling: I discussed with the patient the risks of guselkumab including but not limited to immunosuppression, serious infections, worsening of inflammatory bowel disease and drug reactions.  The patient understands that monitoring is required including a PPD at baseline and must alert us or the primary physician if symptoms of infection or other concerning signs are noted. Protopic Pregnancy And Lactation Text: This medication is Pregnancy Category C. It is unknown if this medication is excreted in breast milk when applied topically. Dupixent Counseling: I discussed with the patient the risks of dupilumab including but not limited to eye infection and irritation, cold sores, injection site reactions, worsening of asthma, allergic reactions and increased risk of parasitic infection.  Live vaccines should be avoided while taking dupilumab. Dupilumab will also interact with certain medications such as warfarin and cyclosporine. The patient understands that monitoring is required and they must alert us or the primary physician if symptoms of infection or other concerning signs are noted. SSKI Counseling:  I discussed with the patient the risks of SSKI including but not limited to thyroid abnormalities, metallic taste, GI upset, fever, headache, acne, arthralgias, paraesthesias, lymphadenopathy, easy bleeding, arrhythmias, and allergic reaction. Hydroxychloroquine Counseling:  I discussed with the patient that a baseline ophthalmologic exam is needed at the start of therapy and every year thereafter while on therapy. A CBC may also be warranted for monitoring.  The side effects of this medication were discussed with the patient, including but not limited to agranulocytosis, aplastic anemia, seizures, rashes, retinopathy, and liver toxicity. Patient instructed to call the office should any adverse effect occur.  The patient verbalized understanding of the proper use and possible adverse effects of Plaquenil.  All the patient's questions and concerns were addressed.

## 2023-04-07 NOTE — DISCHARGE NOTE PROVIDER - PROVIDER TOKENS
PROVIDER:[TOKEN:[[id number]],FOLLOWUP:[1 week],ESTABLISHEDPATIENT:[T]],FREE:[LAST:[Dami],FIRST:[Jason],PHONE:[(   )    -],FAX:[(   )    -],FOLLOWUP:[1 week]]

## 2023-04-07 NOTE — DISCHARGE NOTE PROVIDER - CARE PROVIDERS DIRECT ADDRESSES
,[email or web address]

## 2023-04-07 NOTE — PROGRESS NOTE ADULT - TIME BILLING
Pedro Jurado is an 82-year-old man with history of cardiac amyloidosis, pHTN, DMII and prostate ca (s/p radiation seeds). He presented with dyspnea on exertion due to ADHF (acute HFpEF) in the setting of cardiac amyloidosis, (followed by Dr. Laurent at [facility]). TTE 4/6/23 noted Low normal left ventricular systolic function.  Severe concentric left ventricular hypertrophy. LVEF 53%. Normal left ventricular diastolic function. Nevertheless, there is evidence of volume overload on examination.

## 2023-04-07 NOTE — PROGRESS NOTE ADULT - PROBLEM SELECTOR PLAN 2
- noted on TTE of 05/2022, but per pulm notes, no pHTN noted on [facility]  - cardiology consulted as above

## 2023-04-07 NOTE — DISCHARGE NOTE PROVIDER - CARE PROVIDER_API CALL
Jason Laurent (MD)  Cardiology; Internal Medicine  [address], [address]  [place] [address]  Phone: [phone]  Fax: [phone]  Established Patient  Follow Up Time: 1 week    Jason Lomax  Phone: (   )    -  Fax: (   )    -  Follow Up Time: 1 week

## 2023-04-08 LAB
ALBUMIN SERPL ELPH-MCNC: 2.9 G/DL — LOW (ref 3.3–5)
ALP SERPL-CCNC: 68 U/L — SIGNIFICANT CHANGE UP (ref 40–120)
ALT FLD-CCNC: 16 U/L — SIGNIFICANT CHANGE UP (ref 4–41)
ANION GAP SERPL CALC-SCNC: 12 MMOL/L — SIGNIFICANT CHANGE UP (ref 7–14)
AST SERPL-CCNC: 18 U/L — SIGNIFICANT CHANGE UP (ref 4–40)
BASOPHILS # BLD AUTO: 0.04 K/UL — SIGNIFICANT CHANGE UP (ref 0–0.2)
BASOPHILS NFR BLD AUTO: 0.7 % — SIGNIFICANT CHANGE UP (ref 0–2)
BILIRUB SERPL-MCNC: 0.5 MG/DL — SIGNIFICANT CHANGE UP (ref 0.2–1.2)
BUN SERPL-MCNC: 25 MG/DL — HIGH (ref 7–23)
CALCIUM SERPL-MCNC: 8.6 MG/DL — SIGNIFICANT CHANGE UP (ref 8.4–10.5)
CHLORIDE SERPL-SCNC: 102 MMOL/L — SIGNIFICANT CHANGE UP (ref 98–107)
CO2 SERPL-SCNC: 23 MMOL/L — SIGNIFICANT CHANGE UP (ref 22–31)
CREAT SERPL-MCNC: 1.07 MG/DL — SIGNIFICANT CHANGE UP (ref 0.5–1.3)
EGFR: 70 ML/MIN/1.73M2 — SIGNIFICANT CHANGE UP
EOSINOPHIL # BLD AUTO: 0.3 K/UL — SIGNIFICANT CHANGE UP (ref 0–0.5)
EOSINOPHIL NFR BLD AUTO: 4.9 % — SIGNIFICANT CHANGE UP (ref 0–6)
GLUCOSE BLDC GLUCOMTR-MCNC: 124 MG/DL — HIGH (ref 70–99)
GLUCOSE BLDC GLUCOMTR-MCNC: 135 MG/DL — HIGH (ref 70–99)
GLUCOSE BLDC GLUCOMTR-MCNC: 147 MG/DL — HIGH (ref 70–99)
GLUCOSE BLDC GLUCOMTR-MCNC: 157 MG/DL — HIGH (ref 70–99)
GLUCOSE SERPL-MCNC: 142 MG/DL — HIGH (ref 70–99)
HCT VFR BLD CALC: 33 % — LOW (ref 39–50)
HGB BLD-MCNC: 10.6 G/DL — LOW (ref 13–17)
IANC: 3.7 K/UL — SIGNIFICANT CHANGE UP (ref 1.8–7.4)
IMM GRANULOCYTES NFR BLD AUTO: 0.2 % — SIGNIFICANT CHANGE UP (ref 0–0.9)
LYMPHOCYTES # BLD AUTO: 1.57 K/UL — SIGNIFICANT CHANGE UP (ref 1–3.3)
LYMPHOCYTES # BLD AUTO: 25.6 % — SIGNIFICANT CHANGE UP (ref 13–44)
MAGNESIUM SERPL-MCNC: 2 MG/DL — SIGNIFICANT CHANGE UP (ref 1.6–2.6)
MCHC RBC-ENTMCNC: 27 PG — SIGNIFICANT CHANGE UP (ref 27–34)
MCHC RBC-ENTMCNC: 32.1 GM/DL — SIGNIFICANT CHANGE UP (ref 32–36)
MCV RBC AUTO: 84 FL — SIGNIFICANT CHANGE UP (ref 80–100)
MONOCYTES # BLD AUTO: 0.51 K/UL — SIGNIFICANT CHANGE UP (ref 0–0.9)
MONOCYTES NFR BLD AUTO: 8.3 % — SIGNIFICANT CHANGE UP (ref 2–14)
NEUTROPHILS # BLD AUTO: 3.7 K/UL — SIGNIFICANT CHANGE UP (ref 1.8–7.4)
NEUTROPHILS NFR BLD AUTO: 60.3 % — SIGNIFICANT CHANGE UP (ref 43–77)
NRBC # BLD: 0 /100 WBCS — SIGNIFICANT CHANGE UP (ref 0–0)
NRBC # FLD: 0 K/UL — SIGNIFICANT CHANGE UP (ref 0–0)
PHOSPHATE SERPL-MCNC: 3.5 MG/DL — SIGNIFICANT CHANGE UP (ref 2.5–4.5)
PLATELET # BLD AUTO: 253 K/UL — SIGNIFICANT CHANGE UP (ref 150–400)
POTASSIUM SERPL-MCNC: 4.4 MMOL/L — SIGNIFICANT CHANGE UP (ref 3.5–5.3)
POTASSIUM SERPL-SCNC: 4.4 MMOL/L — SIGNIFICANT CHANGE UP (ref 3.5–5.3)
PROT SERPL-MCNC: 5.5 G/DL — LOW (ref 6–8.3)
RBC # BLD: 3.93 M/UL — LOW (ref 4.2–5.8)
RBC # FLD: 16.4 % — HIGH (ref 10.3–14.5)
SODIUM SERPL-SCNC: 137 MMOL/L — SIGNIFICANT CHANGE UP (ref 135–145)
WBC # BLD: 6.13 K/UL — SIGNIFICANT CHANGE UP (ref 3.8–10.5)
WBC # FLD AUTO: 6.13 K/UL — SIGNIFICANT CHANGE UP (ref 3.8–10.5)

## 2023-04-08 PROCEDURE — 99232 SBSQ HOSP IP/OBS MODERATE 35: CPT

## 2023-04-08 RX ADMIN — PANTOPRAZOLE SODIUM 40 MILLIGRAM(S): 20 TABLET, DELAYED RELEASE ORAL at 05:28

## 2023-04-08 RX ADMIN — Medication 1: at 11:47

## 2023-04-08 RX ADMIN — SIMVASTATIN 20 MILLIGRAM(S): 20 TABLET, FILM COATED ORAL at 22:01

## 2023-04-08 RX ADMIN — HEPARIN SODIUM 5000 UNIT(S): 5000 INJECTION INTRAVENOUS; SUBCUTANEOUS at 05:27

## 2023-04-08 RX ADMIN — GABAPENTIN 100 MILLIGRAM(S): 400 CAPSULE ORAL at 22:01

## 2023-04-08 RX ADMIN — Medication 40 MILLIGRAM(S): at 05:28

## 2023-04-08 RX ADMIN — Medication 40 MILLIGRAM(S): at 11:46

## 2023-04-08 RX ADMIN — HEPARIN SODIUM 5000 UNIT(S): 5000 INJECTION INTRAVENOUS; SUBCUTANEOUS at 17:25

## 2023-04-08 NOTE — PROGRESS NOTE ADULT - SUBJECTIVE AND OBJECTIVE BOX
Patient is a 82y old  Male who presents with a chief complaint of Shortness of breath (07 Apr 2023 14:33)      SUBJECTIVE / OVERNIGHT EVENTS:    No events overnight. This AM, patient without n/v/d/cp/sob.      MEDICATIONS  (STANDING):  dextrose 5%. 1000 milliLiter(s) (50 mL/Hr) IV Continuous <Continuous>  dextrose 5%. 1000 milliLiter(s) (100 mL/Hr) IV Continuous <Continuous>  dextrose 50% Injectable 25 Gram(s) IV Push once  dextrose 50% Injectable 12.5 Gram(s) IV Push once  dextrose 50% Injectable 25 Gram(s) IV Push once  furosemide   Injectable 40 milliGRAM(s) IV Push two times a day  gabapentin 100 milliGRAM(s) Oral at bedtime  glucagon  Injectable 1 milliGRAM(s) IntraMuscular once  heparin   Injectable 5000 Unit(s) SubCutaneous every 12 hours  insulin lispro (ADMELOG) corrective regimen sliding scale   SubCutaneous three times a day before meals  insulin lispro (ADMELOG) corrective regimen sliding scale   SubCutaneous at bedtime  lisinopril 10 milliGRAM(s) Oral every 24 hours  pantoprazole    Tablet 40 milliGRAM(s) Oral before breakfast  simvastatin 20 milliGRAM(s) Oral at bedtime    MEDICATIONS  (PRN):  acetaminophen     Tablet .. 650 milliGRAM(s) Oral every 6 hours PRN Mild Pain (1 - 3)  dextrose Oral Gel 15 Gram(s) Oral once PRN Blood Glucose LESS THAN 70 milliGRAM(s)/deciliter      PHYSICAL EXAM:  T(C): 36.6 (04-08-23 @ 10:34), Max: 37.2 (04-07-23 @ 21:31)  HR: 85 (04-08-23 @ 10:34) (85 - 98)  BP: 96/61 (04-08-23 @ 10:34) (95/60 - 115/70)  RR: 18 (04-08-23 @ 10:34) (17 - 18)  SpO2: 100% (04-08-23 @ 10:34) (100% - 100%)  I&O's Summary    07 Apr 2023 07:01  -  08 Apr 2023 07:00  --------------------------------------------------------  IN: 550 mL / OUT: 770 mL / NET: -220 mL    08 Apr 2023 07:01  -  08 Apr 2023 11:39  --------------------------------------------------------  IN: 0 mL / OUT: 1500 mL / NET: -1500 mL      GENERAL: NAD, lying in bed  HEAD:  Atraumatic, Normocephalic, MMM  CHEST/LUNG: No use of accessory muscles, CTAB, breathing non-labored  COR: RR, no mrcg  ABD: Soft, ND/NT, +BS  PSYCH: AAOx3  NEUROLOGY: CN II-XII grossly intact, moving all extremities  SKIN: No rashes or lesions  EXT: wwp, no cc, 2+ pitting edema to shins    LABS:  CAPILLARY BLOOD GLUCOSE      POCT Blood Glucose.: 157 mg/dL (08 Apr 2023 11:35)  POCT Blood Glucose.: 135 mg/dL (08 Apr 2023 07:26)  POCT Blood Glucose.: 143 mg/dL (07 Apr 2023 23:13)  POCT Blood Glucose.: 128 mg/dL (07 Apr 2023 16:25)                          10.6   6.13  )-----------( 253      ( 08 Apr 2023 06:00 )             33.0     04-08    137  |  102  |  25<H>  ----------------------------<  142<H>  4.4   |  23  |  1.07    Ca    8.6      08 Apr 2023 06:00  Phos  3.5     04-08  Mg     2.00     04-08    TPro  5.5<L>  /  Alb  2.9<L>  /  TBili  0.5  /  DBili  x   /  AST  18  /  ALT  16  /  AlkPhos  68  04-08                RADIOLOGY & ADDITIONAL TESTS:    Telemetry Personally Reviewed - RN documented afib overnight, no clear afib per review, will await cardiology review    Imaging Personally Reviewed -     Imaging Reviewed -     Consultant(s) Notes Reviewed -       Care Discussed with Consultants/Other Providers -

## 2023-04-08 NOTE — PROGRESS NOTE ADULT - PROBLEM SELECTOR PLAN 1
-patient with cardiac amyloidosis, follows w/Dr Laurent  -pt states has not been taking some medications due to urinary incontinence when he takes them  -self discontinued Tafamidis on 01/01/2023, but restarted in ~ mid March  -TTE of 05/2022 significant for severe pulmonary hypertension, but per pulm notes, RHC w/o evidence of pHTN  -house cardiology consulted, recs reviewed   -continue with IV furosemide 40mg BID  -started lisinopril at reduced dose (10mg) daily  -TTE reviewed - concentric LVH, EF 53%  - monitor intake/output, weight daily, fluid restriction of 1 liters daily  - continues on supplemental oxygen via nasal cannula  - concern for afib on RN documentation overnight, no clear burst of afib on my review, will await cardiology recommendations; HR at goal

## 2023-04-09 LAB
ALBUMIN SERPL ELPH-MCNC: 3.1 G/DL — LOW (ref 3.3–5)
ALP SERPL-CCNC: 75 U/L — SIGNIFICANT CHANGE UP (ref 40–120)
ALT FLD-CCNC: 18 U/L — SIGNIFICANT CHANGE UP (ref 4–41)
ANION GAP SERPL CALC-SCNC: 12 MMOL/L — SIGNIFICANT CHANGE UP (ref 7–14)
AST SERPL-CCNC: 23 U/L — SIGNIFICANT CHANGE UP (ref 4–40)
BASOPHILS # BLD AUTO: 0.05 K/UL — SIGNIFICANT CHANGE UP (ref 0–0.2)
BASOPHILS NFR BLD AUTO: 0.9 % — SIGNIFICANT CHANGE UP (ref 0–2)
BILIRUB SERPL-MCNC: 0.6 MG/DL — SIGNIFICANT CHANGE UP (ref 0.2–1.2)
BUN SERPL-MCNC: 27 MG/DL — HIGH (ref 7–23)
CALCIUM SERPL-MCNC: 9 MG/DL — SIGNIFICANT CHANGE UP (ref 8.4–10.5)
CHLORIDE SERPL-SCNC: 100 MMOL/L — SIGNIFICANT CHANGE UP (ref 98–107)
CO2 SERPL-SCNC: 25 MMOL/L — SIGNIFICANT CHANGE UP (ref 22–31)
CREAT SERPL-MCNC: 1.18 MG/DL — SIGNIFICANT CHANGE UP (ref 0.5–1.3)
CULTURE RESULTS: SIGNIFICANT CHANGE UP
CULTURE RESULTS: SIGNIFICANT CHANGE UP
EGFR: 62 ML/MIN/1.73M2 — SIGNIFICANT CHANGE UP
EOSINOPHIL # BLD AUTO: 0.36 K/UL — SIGNIFICANT CHANGE UP (ref 0–0.5)
EOSINOPHIL NFR BLD AUTO: 6.2 % — HIGH (ref 0–6)
GLUCOSE BLDC GLUCOMTR-MCNC: 103 MG/DL — HIGH (ref 70–99)
GLUCOSE BLDC GLUCOMTR-MCNC: 118 MG/DL — HIGH (ref 70–99)
GLUCOSE BLDC GLUCOMTR-MCNC: 158 MG/DL — HIGH (ref 70–99)
GLUCOSE BLDC GLUCOMTR-MCNC: 181 MG/DL — HIGH (ref 70–99)
GLUCOSE SERPL-MCNC: 135 MG/DL — HIGH (ref 70–99)
HCT VFR BLD CALC: 37.1 % — LOW (ref 39–50)
HGB BLD-MCNC: 12 G/DL — LOW (ref 13–17)
IANC: 3.4 K/UL — SIGNIFICANT CHANGE UP (ref 1.8–7.4)
IMM GRANULOCYTES NFR BLD AUTO: 0.2 % — SIGNIFICANT CHANGE UP (ref 0–0.9)
LYMPHOCYTES # BLD AUTO: 1.46 K/UL — SIGNIFICANT CHANGE UP (ref 1–3.3)
LYMPHOCYTES # BLD AUTO: 25.1 % — SIGNIFICANT CHANGE UP (ref 13–44)
MAGNESIUM SERPL-MCNC: 1.9 MG/DL — SIGNIFICANT CHANGE UP (ref 1.6–2.6)
MCHC RBC-ENTMCNC: 26.7 PG — LOW (ref 27–34)
MCHC RBC-ENTMCNC: 32.3 GM/DL — SIGNIFICANT CHANGE UP (ref 32–36)
MCV RBC AUTO: 82.6 FL — SIGNIFICANT CHANGE UP (ref 80–100)
MONOCYTES # BLD AUTO: 0.53 K/UL — SIGNIFICANT CHANGE UP (ref 0–0.9)
MONOCYTES NFR BLD AUTO: 9.1 % — SIGNIFICANT CHANGE UP (ref 2–14)
NEUTROPHILS # BLD AUTO: 3.4 K/UL — SIGNIFICANT CHANGE UP (ref 1.8–7.4)
NEUTROPHILS NFR BLD AUTO: 58.5 % — SIGNIFICANT CHANGE UP (ref 43–77)
NRBC # BLD: 0 /100 WBCS — SIGNIFICANT CHANGE UP (ref 0–0)
NRBC # FLD: 0 K/UL — SIGNIFICANT CHANGE UP (ref 0–0)
PHOSPHATE SERPL-MCNC: 3.8 MG/DL — SIGNIFICANT CHANGE UP (ref 2.5–4.5)
PLATELET # BLD AUTO: 282 K/UL — SIGNIFICANT CHANGE UP (ref 150–400)
POTASSIUM SERPL-MCNC: 4.2 MMOL/L — SIGNIFICANT CHANGE UP (ref 3.5–5.3)
POTASSIUM SERPL-SCNC: 4.2 MMOL/L — SIGNIFICANT CHANGE UP (ref 3.5–5.3)
PROT SERPL-MCNC: 5.8 G/DL — LOW (ref 6–8.3)
RBC # BLD: 4.49 M/UL — SIGNIFICANT CHANGE UP (ref 4.2–5.8)
RBC # FLD: 16.1 % — HIGH (ref 10.3–14.5)
SODIUM SERPL-SCNC: 137 MMOL/L — SIGNIFICANT CHANGE UP (ref 135–145)
SPECIMEN SOURCE: SIGNIFICANT CHANGE UP
SPECIMEN SOURCE: SIGNIFICANT CHANGE UP
VIT B1 SERPL-MCNC: 267.5 NMOL/L — HIGH (ref 66.5–200)
WBC # BLD: 5.81 K/UL — SIGNIFICANT CHANGE UP (ref 3.8–10.5)
WBC # FLD AUTO: 5.81 K/UL — SIGNIFICANT CHANGE UP (ref 3.8–10.5)

## 2023-04-09 PROCEDURE — 99232 SBSQ HOSP IP/OBS MODERATE 35: CPT

## 2023-04-09 RX ORDER — MAGNESIUM SULFATE 500 MG/ML
2 VIAL (ML) INJECTION ONCE
Refills: 0 | Status: COMPLETED | OUTPATIENT
Start: 2023-04-09 | End: 2023-04-09

## 2023-04-09 RX ADMIN — HEPARIN SODIUM 5000 UNIT(S): 5000 INJECTION INTRAVENOUS; SUBCUTANEOUS at 17:57

## 2023-04-09 RX ADMIN — PANTOPRAZOLE SODIUM 40 MILLIGRAM(S): 20 TABLET, DELAYED RELEASE ORAL at 06:12

## 2023-04-09 RX ADMIN — SIMVASTATIN 20 MILLIGRAM(S): 20 TABLET, FILM COATED ORAL at 22:35

## 2023-04-09 RX ADMIN — Medication 1: at 12:05

## 2023-04-09 RX ADMIN — Medication 40 MILLIGRAM(S): at 06:14

## 2023-04-09 RX ADMIN — Medication 25 GRAM(S): at 13:42

## 2023-04-09 RX ADMIN — Medication 40 MILLIGRAM(S): at 13:42

## 2023-04-09 RX ADMIN — LISINOPRIL 10 MILLIGRAM(S): 2.5 TABLET ORAL at 13:50

## 2023-04-09 RX ADMIN — GABAPENTIN 100 MILLIGRAM(S): 400 CAPSULE ORAL at 22:35

## 2023-04-09 RX ADMIN — HEPARIN SODIUM 5000 UNIT(S): 5000 INJECTION INTRAVENOUS; SUBCUTANEOUS at 06:15

## 2023-04-09 NOTE — PROGRESS NOTE ADULT - PROBLEM SELECTOR PLAN 1
-patient with cardiac amyloidosis, follows w/Dr Laurent  -pt states has not been taking some medications due to urinary incontinence when he takes them  -self discontinued Tafamidis on 01/01/2023, but restarted in ~ mid March  -TTE of 05/2022 significant for severe pulmonary hypertension, but per pulm notes, RHC w/o evidence of pHTN  -house cardiology consulted, recs reviewed   -continue with IV furosemide 40mg BID, edema is improving  -started lisinopril at reduced dose (10mg) daily  -TTE reviewed - concentric LVH, EF 53%  - monitor intake/output, weight daily, fluid restriction of 1 liters daily  - continues on supplemental oxygen via nasal cannula  - concern for afib on RN documentation, however, no clear atrial fibrillation on my review; appears NSR. Will also await cardiology's review of telemetry. HR at goal.

## 2023-04-09 NOTE — PROGRESS NOTE ADULT - SUBJECTIVE AND OBJECTIVE BOX
Patient is a 82y old  Male who presents with a chief complaint of Shortness of breath (08 Apr 2023 11:39)      SUBJECTIVE / OVERNIGHT EVENTS:    No events overnight. This AM, patient without n/v/d/cp/sob.      MEDICATIONS  (STANDING):  dextrose 5%. 1000 milliLiter(s) (50 mL/Hr) IV Continuous <Continuous>  dextrose 5%. 1000 milliLiter(s) (100 mL/Hr) IV Continuous <Continuous>  dextrose 50% Injectable 25 Gram(s) IV Push once  dextrose 50% Injectable 12.5 Gram(s) IV Push once  dextrose 50% Injectable 25 Gram(s) IV Push once  furosemide   Injectable 40 milliGRAM(s) IV Push two times a day  gabapentin 100 milliGRAM(s) Oral at bedtime  glucagon  Injectable 1 milliGRAM(s) IntraMuscular once  heparin   Injectable 5000 Unit(s) SubCutaneous every 12 hours  insulin lispro (ADMELOG) corrective regimen sliding scale   SubCutaneous three times a day before meals  insulin lispro (ADMELOG) corrective regimen sliding scale   SubCutaneous at bedtime  lisinopril 10 milliGRAM(s) Oral every 24 hours  magnesium sulfate  IVPB 2 Gram(s) IV Intermittent once  pantoprazole    Tablet 40 milliGRAM(s) Oral before breakfast  simvastatin 20 milliGRAM(s) Oral at bedtime    MEDICATIONS  (PRN):  acetaminophen     Tablet .. 650 milliGRAM(s) Oral every 6 hours PRN Mild Pain (1 - 3)  dextrose Oral Gel 15 Gram(s) Oral once PRN Blood Glucose LESS THAN 70 milliGRAM(s)/deciliter      PHYSICAL EXAM:  T(C): 36.8 (04-09-23 @ 06:08), Max: 36.9 (04-08-23 @ 21:57)  HR: 91 (04-09-23 @ 06:08) (85 - 100)  BP: 103/75 (04-09-23 @ 06:08) (96/57 - 118/65)  RR: 18 (04-09-23 @ 06:08) (18 - 20)  SpO2: 100% (04-09-23 @ 06:08) (100% - 100%)  I&O's Summary    08 Apr 2023 07:01  -  09 Apr 2023 07:00  --------------------------------------------------------  IN: 700 mL / OUT: 4475 mL / NET: -3775 mL      GENERAL: NAD, lying in bed  HEAD:  Atraumatic, Normocephalic, MMM  CHEST/LUNG: No use of accessory muscles, CTAB, breathing non-labored  COR: RR, no mrcg  ABD: Soft, ND/NT, +BS  PSYCH: AAOx3  NEUROLOGY: CN II-XII grossly intact, moving all extremities  SKIN: No rashes or lesions  EXT: wwp, 1+ edema to shins    LABS:  CAPILLARY BLOOD GLUCOSE      POCT Blood Glucose.: 118 mg/dL (09 Apr 2023 07:17)  POCT Blood Glucose.: 147 mg/dL (08 Apr 2023 21:11)  POCT Blood Glucose.: 124 mg/dL (08 Apr 2023 16:36)  POCT Blood Glucose.: 157 mg/dL (08 Apr 2023 11:35)                          12.0   5.81  )-----------( 282      ( 09 Apr 2023 06:19 )             37.1     04-09    137  |  100  |  27<H>  ----------------------------<  135<H>  4.2   |  25  |  1.18    Ca    9.0      09 Apr 2023 06:19  Phos  3.8     04-09  Mg     1.90     04-09    TPro  5.8<L>  /  Alb  3.1<L>  /  TBili  0.6  /  DBili  x   /  AST  23  /  ALT  18  /  AlkPhos  75  04-09                RADIOLOGY & ADDITIONAL TESTS:    Telemetry Personally Reviewed - NSR    Imaging Personally Reviewed -     Imaging Reviewed -     Consultant(s) Notes Reviewed -       Care Discussed with Consultants/Other Providers -

## 2023-04-10 LAB
ALBUMIN SERPL ELPH-MCNC: 3 G/DL — LOW (ref 3.3–5)
ALP SERPL-CCNC: 75 U/L — SIGNIFICANT CHANGE UP (ref 40–120)
ALT FLD-CCNC: 19 U/L — SIGNIFICANT CHANGE UP (ref 4–41)
ANION GAP SERPL CALC-SCNC: 11 MMOL/L — SIGNIFICANT CHANGE UP (ref 7–14)
AST SERPL-CCNC: 22 U/L — SIGNIFICANT CHANGE UP (ref 4–40)
BASOPHILS # BLD AUTO: 0.05 K/UL — SIGNIFICANT CHANGE UP (ref 0–0.2)
BASOPHILS NFR BLD AUTO: 0.9 % — SIGNIFICANT CHANGE UP (ref 0–2)
BILIRUB SERPL-MCNC: 0.4 MG/DL — SIGNIFICANT CHANGE UP (ref 0.2–1.2)
BUN SERPL-MCNC: 31 MG/DL — HIGH (ref 7–23)
CALCIUM SERPL-MCNC: 9 MG/DL — SIGNIFICANT CHANGE UP (ref 8.4–10.5)
CHLORIDE SERPL-SCNC: 98 MMOL/L — SIGNIFICANT CHANGE UP (ref 98–107)
CO2 SERPL-SCNC: 29 MMOL/L — SIGNIFICANT CHANGE UP (ref 22–31)
CREAT SERPL-MCNC: 1.36 MG/DL — HIGH (ref 0.5–1.3)
EGFR: 52 ML/MIN/1.73M2 — LOW
EOSINOPHIL # BLD AUTO: 0.43 K/UL — SIGNIFICANT CHANGE UP (ref 0–0.5)
EOSINOPHIL NFR BLD AUTO: 7.4 % — HIGH (ref 0–6)
GLUCOSE BLDC GLUCOMTR-MCNC: 115 MG/DL — HIGH (ref 70–99)
GLUCOSE BLDC GLUCOMTR-MCNC: 125 MG/DL — HIGH (ref 70–99)
GLUCOSE BLDC GLUCOMTR-MCNC: 154 MG/DL — HIGH (ref 70–99)
GLUCOSE BLDC GLUCOMTR-MCNC: 228 MG/DL — HIGH (ref 70–99)
GLUCOSE SERPL-MCNC: 124 MG/DL — HIGH (ref 70–99)
HCT VFR BLD CALC: 36.5 % — LOW (ref 39–50)
HGB BLD-MCNC: 11.9 G/DL — LOW (ref 13–17)
IANC: 3.19 K/UL — SIGNIFICANT CHANGE UP (ref 1.8–7.4)
IMM GRANULOCYTES NFR BLD AUTO: 0.3 % — SIGNIFICANT CHANGE UP (ref 0–0.9)
LYMPHOCYTES # BLD AUTO: 1.55 K/UL — SIGNIFICANT CHANGE UP (ref 1–3.3)
LYMPHOCYTES # BLD AUTO: 26.5 % — SIGNIFICANT CHANGE UP (ref 13–44)
MAGNESIUM SERPL-MCNC: 2.1 MG/DL — SIGNIFICANT CHANGE UP (ref 1.6–2.6)
MCHC RBC-ENTMCNC: 26.9 PG — LOW (ref 27–34)
MCHC RBC-ENTMCNC: 32.6 GM/DL — SIGNIFICANT CHANGE UP (ref 32–36)
MCV RBC AUTO: 82.6 FL — SIGNIFICANT CHANGE UP (ref 80–100)
MONOCYTES # BLD AUTO: 0.6 K/UL — SIGNIFICANT CHANGE UP (ref 0–0.9)
MONOCYTES NFR BLD AUTO: 10.3 % — SIGNIFICANT CHANGE UP (ref 2–14)
NEUTROPHILS # BLD AUTO: 3.19 K/UL — SIGNIFICANT CHANGE UP (ref 1.8–7.4)
NEUTROPHILS NFR BLD AUTO: 54.6 % — SIGNIFICANT CHANGE UP (ref 43–77)
NRBC # BLD: 0 /100 WBCS — SIGNIFICANT CHANGE UP (ref 0–0)
NRBC # FLD: 0 K/UL — SIGNIFICANT CHANGE UP (ref 0–0)
PHOSPHATE SERPL-MCNC: 4.1 MG/DL — SIGNIFICANT CHANGE UP (ref 2.5–4.5)
PLATELET # BLD AUTO: 273 K/UL — SIGNIFICANT CHANGE UP (ref 150–400)
POTASSIUM SERPL-MCNC: 4.6 MMOL/L — SIGNIFICANT CHANGE UP (ref 3.5–5.3)
POTASSIUM SERPL-SCNC: 4.6 MMOL/L — SIGNIFICANT CHANGE UP (ref 3.5–5.3)
PROT SERPL-MCNC: 5.7 G/DL — LOW (ref 6–8.3)
RBC # BLD: 4.42 M/UL — SIGNIFICANT CHANGE UP (ref 4.2–5.8)
RBC # FLD: 15.9 % — HIGH (ref 10.3–14.5)
SODIUM SERPL-SCNC: 138 MMOL/L — SIGNIFICANT CHANGE UP (ref 135–145)
WBC # BLD: 5.84 K/UL — SIGNIFICANT CHANGE UP (ref 3.8–10.5)
WBC # FLD AUTO: 5.84 K/UL — SIGNIFICANT CHANGE UP (ref 3.8–10.5)

## 2023-04-10 PROCEDURE — 99232 SBSQ HOSP IP/OBS MODERATE 35: CPT

## 2023-04-10 RX ORDER — INSULIN GLARGINE 100 [IU]/ML
3 INJECTION, SOLUTION SUBCUTANEOUS AT BEDTIME
Refills: 0 | Status: DISCONTINUED | OUTPATIENT
Start: 2023-04-10 | End: 2023-04-14

## 2023-04-10 RX ADMIN — GABAPENTIN 100 MILLIGRAM(S): 400 CAPSULE ORAL at 21:39

## 2023-04-10 RX ADMIN — Medication 2: at 11:25

## 2023-04-10 RX ADMIN — SIMVASTATIN 20 MILLIGRAM(S): 20 TABLET, FILM COATED ORAL at 21:39

## 2023-04-10 RX ADMIN — HEPARIN SODIUM 5000 UNIT(S): 5000 INJECTION INTRAVENOUS; SUBCUTANEOUS at 05:45

## 2023-04-10 RX ADMIN — PANTOPRAZOLE SODIUM 40 MILLIGRAM(S): 20 TABLET, DELAYED RELEASE ORAL at 05:45

## 2023-04-10 RX ADMIN — HEPARIN SODIUM 5000 UNIT(S): 5000 INJECTION INTRAVENOUS; SUBCUTANEOUS at 17:12

## 2023-04-10 RX ADMIN — INSULIN GLARGINE 3 UNIT(S): 100 INJECTION, SOLUTION SUBCUTANEOUS at 22:26

## 2023-04-10 NOTE — PROGRESS NOTE ADULT - PROBLEM SELECTOR PLAN 1
-patient with cardiac amyloidosis, follows w/Dr Laurent  -pt states has not been taking some medications due to urinary incontinence when he takes them  -self discontinued Tafamidis on 01/01/2023, but restarted in ~ mid March  -TTE of 05/2022 significant for severe pulmonary hypertension, but per pulm notes, RHC w/o evidence of pHTN  -house cardiology consulted, recs reviewed   -continue with IV furosemide 40mg BID, edema is improving  -started lisinopril at reduced dose (10mg) daily  -TTE reviewed - concentric LVH, EF 53%  - monitor intake/output, weight daily, fluid restriction of 1 liters daily  - continues on supplemental oxygen via nasal cannula  - concern for afib on RN documentation, however, no clear atrial fibrillation on my review; appears NSR. Will also await cardiology's review of telemetry. HR at goal. patient with cardiac amyloidosis and  follows with Dr Laurent.   not been taking some medications due to urinary incontinence when he takes them  self discontinued Tafamidis on 01/01/2023, but restarted in ~ mid March  TTE of 05/2022 significant for severe pulmonary hypertension, but per pulm notes, RHC w/o evidence of pHTN  TTE reviewed - concentric LVH, EF 53%    - Previously started on IV furosemide 40mg BID-> will hold for today  - house cardiology consulted, recs reviewed  - started lisinopril at reduced dose (10mg) daily-> will hold for today due to low BP   - monitor intake/output, weight daily, fluid restriction of 1 liters daily  - continue supplemental oxygen  - concern for afib on RN documentation previously, however, no clear atrial fibrillation on my review; appears NSR. Will follow up cardiology's review of telemetry. HR at goal at this time

## 2023-04-10 NOTE — PROGRESS NOTE ADULT - ASSESSMENT
82 year old male, w/ Hx  DM2, Prostate cancer - s/p radiation seeds implant, and Meniere's disease, severe Pulmonary hypertension, Chronic lower extremities edema p/w  secondary to dyspnea on exertion.      82 year old male, w/ Hx  DM2, Prostate cancer - s/p radiation seeds implant, and Meniere's disease, severe Pulmonary hypertension, Chronic lower extremities edema presented with  dyspnea on exertion.   Admitted for further evaluation.

## 2023-04-10 NOTE — PROGRESS NOTE ADULT - PROBLEM SELECTOR PLAN 3
A1c 6.3%  - on metformin PTA (held)  - L-ISS, per FS  - consistent carb diet  -monitoring FS A1c 6.3%, on metformin PTA (held)  - Continue Lantus/ISS, per FS  - consistent carb diet  - monitoring FS

## 2023-04-10 NOTE — PROVIDER CONTACT NOTE (OTHER) - ASSESSMENT
HR in 80-90. pt on 2L NC, sating well >95%. pt asymptomatic, no dizziness, lightheadedness and sob reported
patient was resting on bed, denied chest pain and SOB, O2 sat maintained above 92% with oxygen 2L/min, patient is asymptomatic at this time.

## 2023-04-10 NOTE — PROVIDER CONTACT NOTE (OTHER) - ACTION/TREATMENT ORDERED:
acp notified. CTM as pt is getting diuresed, cant provide fluids as per acp. next bp check for am meds and will reasess the bp. safety maintained.
provider aware with order of 12 lead EKG.

## 2023-04-10 NOTE — PROGRESS NOTE ADULT - SUBJECTIVE AND OBJECTIVE BOX
Patient is a 82y old  Male who presents with a chief complaint of Shortness of breath (09 Apr 2023 10:14)      SUBJECTIVE / OVERNIGHT EVENTS:    No events overnight. This AM, patient without n/v/d/cp/sob.      MEDICATIONS  (STANDING):  dextrose 5%. 1000 milliLiter(s) (50 mL/Hr) IV Continuous <Continuous>  dextrose 5%. 1000 milliLiter(s) (100 mL/Hr) IV Continuous <Continuous>  dextrose 50% Injectable 25 Gram(s) IV Push once  dextrose 50% Injectable 12.5 Gram(s) IV Push once  dextrose 50% Injectable 25 Gram(s) IV Push once  gabapentin 100 milliGRAM(s) Oral at bedtime  glucagon  Injectable 1 milliGRAM(s) IntraMuscular once  heparin   Injectable 5000 Unit(s) SubCutaneous every 12 hours  insulin glargine Injectable (LANTUS) 3 Unit(s) SubCutaneous at bedtime  insulin lispro (ADMELOG) corrective regimen sliding scale   SubCutaneous three times a day before meals  insulin lispro (ADMELOG) corrective regimen sliding scale   SubCutaneous at bedtime  pantoprazole    Tablet 40 milliGRAM(s) Oral before breakfast  simvastatin 20 milliGRAM(s) Oral at bedtime    MEDICATIONS  (PRN):  acetaminophen     Tablet .. 650 milliGRAM(s) Oral every 6 hours PRN Mild Pain (1 - 3)  dextrose Oral Gel 15 Gram(s) Oral once PRN Blood Glucose LESS THAN 70 milliGRAM(s)/deciliter      PHYSICAL EXAM:  T(C): 36.2 (04-10-23 @ 10:10), Max: 36.7 (04-09-23 @ 22:30)  HR: 67 (04-10-23 @ 10:10) (67 - 89)  BP: 81/56 (04-10-23 @ 10:10) (81/56 - 93/61)  RR: 18 (04-10-23 @ 10:10) (17 - 18)  SpO2: 100% (04-10-23 @ 10:10) (100% - 100%)  I&O's Summary    09 Apr 2023 07:01  -  10 Apr 2023 07:00  --------------------------------------------------------  IN: 150 mL / OUT: 2350 mL / NET: -2200 mL    10 Apr 2023 07:01  -  10 Apr 2023 15:07  --------------------------------------------------------  IN: 360 mL / OUT: 375 mL / NET: -15 mL      GENERAL: NAD, well-developed  HEAD:  Atraumatic, Normocephalic, MMM  CHEST/LUNG: No use of accessory muscles, CTAB, breathing non-labored  COR: RR, no mrcg  ABD: Soft, ND/NT, +BS  PSYCH: AAOx3  NEUROLOGY: CN II-XII grossly intact, moving all extremities  SKIN: No rashes or lesions  EXT: wwp, no cce    LABS:  CAPILLARY BLOOD GLUCOSE      POCT Blood Glucose.: 228 mg/dL (10 Apr 2023 11:08)  POCT Blood Glucose.: 125 mg/dL (10 Apr 2023 07:07)  POCT Blood Glucose.: 181 mg/dL (09 Apr 2023 21:28)  POCT Blood Glucose.: 103 mg/dL (09 Apr 2023 16:48)                          11.9   5.84  )-----------( 273      ( 10 Apr 2023 06:00 )             36.5     04-10    138  |  98  |  31<H>  ----------------------------<  124<H>  4.6   |  29  |  1.36<H>    Ca    9.0      10 Apr 2023 06:00  Phos  4.1     04-10  Mg     2.10     04-10    TPro  5.7<L>  /  Alb  3.0<L>  /  TBili  0.4  /  DBili  x   /  AST  22  /  ALT  19  /  AlkPhos  75  04-10                RADIOLOGY & ADDITIONAL TESTS:    Telemetry Personally Reviewed -     Imaging Personally Reviewed -     Imaging Reviewed -     Consultant(s) Notes Reviewed -       Care Discussed with Consultants/Other Providers -  Patient is a 82y old  Male who presents with a chief complaint of Shortness of breath (09 Apr 2023 10:14)      SUBJECTIVE / OVERNIGHT EVENTS:    No events overnight. This AM, patient without n/v/d/cp/sob.  Patient reports breathing has improved and denies any complaints.     MEDICATIONS  (STANDING):  dextrose 5%. 1000 milliLiter(s) (50 mL/Hr) IV Continuous <Continuous>  dextrose 5%. 1000 milliLiter(s) (100 mL/Hr) IV Continuous <Continuous>  dextrose 50% Injectable 25 Gram(s) IV Push once  dextrose 50% Injectable 12.5 Gram(s) IV Push once  dextrose 50% Injectable 25 Gram(s) IV Push once  gabapentin 100 milliGRAM(s) Oral at bedtime  glucagon  Injectable 1 milliGRAM(s) IntraMuscular once  heparin   Injectable 5000 Unit(s) SubCutaneous every 12 hours  insulin glargine Injectable (LANTUS) 3 Unit(s) SubCutaneous at bedtime  insulin lispro (ADMELOG) corrective regimen sliding scale   SubCutaneous three times a day before meals  insulin lispro (ADMELOG) corrective regimen sliding scale   SubCutaneous at bedtime  pantoprazole    Tablet 40 milliGRAM(s) Oral before breakfast  simvastatin 20 milliGRAM(s) Oral at bedtime    MEDICATIONS  (PRN):  acetaminophen     Tablet .. 650 milliGRAM(s) Oral every 6 hours PRN Mild Pain (1 - 3)  dextrose Oral Gel 15 Gram(s) Oral once PRN Blood Glucose LESS THAN 70 milliGRAM(s)/deciliter      PHYSICAL EXAM:  T(C): 36.2 (04-10-23 @ 10:10), Max: 36.7 (04-09-23 @ 22:30)  HR: 67 (04-10-23 @ 10:10) (67 - 89)  BP: 81/56 (04-10-23 @ 10:10) (81/56 - 93/61)  RR: 18 (04-10-23 @ 10:10) (17 - 18)  SpO2: 100% (04-10-23 @ 10:10) (100% - 100%)  I&O's Summary    09 Apr 2023 07:01  -  10 Apr 2023 07:00  --------------------------------------------------------  IN: 150 mL / OUT: 2350 mL / NET: -2200 mL    10 Apr 2023 07:01  -  10 Apr 2023 15:07  --------------------------------------------------------  IN: 360 mL / OUT: 375 mL / NET: -15 mL      GENERAL: NAD, elderly male resting in bed.   HEAD:  Atraumatic, Normocephalic, MMM  CHEST/LUNG: No use of accessory muscles, CTA B/L, breathing non-labored  COR: RR, no m/r/c/g  ABD: Soft, ND/ NT, +BS  PSYCH: AAOx3  NEUROLOGY: CN II-XII grossly intact, moving all extremities  SKIN: No rashes or lesions  EXT: no LE edema noted B/L     LABS:  CAPILLARY BLOOD GLUCOSE      POCT Blood Glucose.: 228 mg/dL (10 Apr 2023 11:08)  POCT Blood Glucose.: 125 mg/dL (10 Apr 2023 07:07)  POCT Blood Glucose.: 181 mg/dL (09 Apr 2023 21:28)  POCT Blood Glucose.: 103 mg/dL (09 Apr 2023 16:48)                          11.9   5.84  )-----------( 273      ( 10 Apr 2023 06:00 )             36.5     04-10    138  |  98  |  31<H>  ----------------------------<  124<H>  4.6   |  29  |  1.36<H>    Ca    9.0      10 Apr 2023 06:00  Phos  4.1     04-10  Mg     2.10     04-10    TPro  5.7<L>  /  Alb  3.0<L>  /  TBili  0.4  /  DBili  x   /  AST  22  /  ALT  19  /  AlkPhos  75  04-10                RADIOLOGY & ADDITIONAL TESTS:    Telemetry Personally Reviewed - NSR     Imaging Personally Reviewed -     Imaging Reviewed -     Consultant(s) Notes Reviewed -       Care Discussed with Consultants/Other Providers -

## 2023-04-10 NOTE — PROGRESS NOTE ADULT - PROBLEM SELECTOR PLAN 2
- noted on TTE of 05/2022, but per pulm notes, no pHTN noted on RHC  - cardiology consulted as above pHTN noted on TTE of 05/2022, but per pulm notes, no pHTN noted on RHC    - Cardiology consulted as above

## 2023-04-11 LAB
ALBUMIN SERPL ELPH-MCNC: 3 G/DL — LOW (ref 3.3–5)
ALP SERPL-CCNC: 69 U/L — SIGNIFICANT CHANGE UP (ref 40–120)
ALT FLD-CCNC: 17 U/L — SIGNIFICANT CHANGE UP (ref 4–41)
ANION GAP SERPL CALC-SCNC: 11 MMOL/L — SIGNIFICANT CHANGE UP (ref 7–14)
AST SERPL-CCNC: 22 U/L — SIGNIFICANT CHANGE UP (ref 4–40)
BASOPHILS # BLD AUTO: 0.06 K/UL — SIGNIFICANT CHANGE UP (ref 0–0.2)
BASOPHILS NFR BLD AUTO: 1.1 % — SIGNIFICANT CHANGE UP (ref 0–2)
BILIRUB SERPL-MCNC: 0.4 MG/DL — SIGNIFICANT CHANGE UP (ref 0.2–1.2)
BUN SERPL-MCNC: 36 MG/DL — HIGH (ref 7–23)
CALCIUM SERPL-MCNC: 8.5 MG/DL — SIGNIFICANT CHANGE UP (ref 8.4–10.5)
CHLORIDE SERPL-SCNC: 98 MMOL/L — SIGNIFICANT CHANGE UP (ref 98–107)
CO2 SERPL-SCNC: 25 MMOL/L — SIGNIFICANT CHANGE UP (ref 22–31)
CREAT SERPL-MCNC: 1.06 MG/DL — SIGNIFICANT CHANGE UP (ref 0.5–1.3)
EGFR: 71 ML/MIN/1.73M2 — SIGNIFICANT CHANGE UP
EOSINOPHIL # BLD AUTO: 0.43 K/UL — SIGNIFICANT CHANGE UP (ref 0–0.5)
EOSINOPHIL NFR BLD AUTO: 7.9 % — HIGH (ref 0–6)
GLUCOSE BLDC GLUCOMTR-MCNC: 109 MG/DL — HIGH (ref 70–99)
GLUCOSE BLDC GLUCOMTR-MCNC: 134 MG/DL — HIGH (ref 70–99)
GLUCOSE BLDC GLUCOMTR-MCNC: 138 MG/DL — HIGH (ref 70–99)
GLUCOSE BLDC GLUCOMTR-MCNC: 141 MG/DL — HIGH (ref 70–99)
GLUCOSE SERPL-MCNC: 117 MG/DL — HIGH (ref 70–99)
HCT VFR BLD CALC: 33.4 % — LOW (ref 39–50)
HGB BLD-MCNC: 11 G/DL — LOW (ref 13–17)
IANC: 2.77 K/UL — SIGNIFICANT CHANGE UP (ref 1.8–7.4)
IMM GRANULOCYTES NFR BLD AUTO: 0.6 % — SIGNIFICANT CHANGE UP (ref 0–0.9)
LYMPHOCYTES # BLD AUTO: 1.63 K/UL — SIGNIFICANT CHANGE UP (ref 1–3.3)
LYMPHOCYTES # BLD AUTO: 30.1 % — SIGNIFICANT CHANGE UP (ref 13–44)
MAGNESIUM SERPL-MCNC: 2.1 MG/DL — SIGNIFICANT CHANGE UP (ref 1.6–2.6)
MCHC RBC-ENTMCNC: 26.6 PG — LOW (ref 27–34)
MCHC RBC-ENTMCNC: 32.9 GM/DL — SIGNIFICANT CHANGE UP (ref 32–36)
MCV RBC AUTO: 80.9 FL — SIGNIFICANT CHANGE UP (ref 80–100)
MONOCYTES # BLD AUTO: 0.49 K/UL — SIGNIFICANT CHANGE UP (ref 0–0.9)
MONOCYTES NFR BLD AUTO: 9.1 % — SIGNIFICANT CHANGE UP (ref 2–14)
NEUTROPHILS # BLD AUTO: 2.77 K/UL — SIGNIFICANT CHANGE UP (ref 1.8–7.4)
NEUTROPHILS NFR BLD AUTO: 51.2 % — SIGNIFICANT CHANGE UP (ref 43–77)
NRBC # BLD: 0 /100 WBCS — SIGNIFICANT CHANGE UP (ref 0–0)
NRBC # FLD: 0 K/UL — SIGNIFICANT CHANGE UP (ref 0–0)
PHOSPHATE SERPL-MCNC: 3.3 MG/DL — SIGNIFICANT CHANGE UP (ref 2.5–4.5)
PLATELET # BLD AUTO: 272 K/UL — SIGNIFICANT CHANGE UP (ref 150–400)
POTASSIUM SERPL-MCNC: 4.1 MMOL/L — SIGNIFICANT CHANGE UP (ref 3.5–5.3)
POTASSIUM SERPL-SCNC: 4.1 MMOL/L — SIGNIFICANT CHANGE UP (ref 3.5–5.3)
PROT SERPL-MCNC: 5.7 G/DL — LOW (ref 6–8.3)
RBC # BLD: 4.13 M/UL — LOW (ref 4.2–5.8)
RBC # FLD: 15.7 % — HIGH (ref 10.3–14.5)
SODIUM SERPL-SCNC: 134 MMOL/L — LOW (ref 135–145)
WBC # BLD: 5.41 K/UL — SIGNIFICANT CHANGE UP (ref 3.8–10.5)
WBC # FLD AUTO: 5.41 K/UL — SIGNIFICANT CHANGE UP (ref 3.8–10.5)

## 2023-04-11 PROCEDURE — 99232 SBSQ HOSP IP/OBS MODERATE 35: CPT

## 2023-04-11 PROCEDURE — 99232 SBSQ HOSP IP/OBS MODERATE 35: CPT | Mod: GC

## 2023-04-11 RX ORDER — FUROSEMIDE 40 MG
40 TABLET ORAL DAILY
Refills: 0 | Status: DISCONTINUED | OUTPATIENT
Start: 2023-04-11 | End: 2023-04-14

## 2023-04-11 RX ADMIN — INSULIN GLARGINE 3 UNIT(S): 100 INJECTION, SOLUTION SUBCUTANEOUS at 22:43

## 2023-04-11 RX ADMIN — SIMVASTATIN 20 MILLIGRAM(S): 20 TABLET, FILM COATED ORAL at 22:02

## 2023-04-11 RX ADMIN — GABAPENTIN 100 MILLIGRAM(S): 400 CAPSULE ORAL at 22:02

## 2023-04-11 RX ADMIN — Medication 40 MILLIGRAM(S): at 17:59

## 2023-04-11 RX ADMIN — HEPARIN SODIUM 5000 UNIT(S): 5000 INJECTION INTRAVENOUS; SUBCUTANEOUS at 17:58

## 2023-04-11 RX ADMIN — PANTOPRAZOLE SODIUM 40 MILLIGRAM(S): 20 TABLET, DELAYED RELEASE ORAL at 06:52

## 2023-04-11 RX ADMIN — HEPARIN SODIUM 5000 UNIT(S): 5000 INJECTION INTRAVENOUS; SUBCUTANEOUS at 06:52

## 2023-04-11 NOTE — DIETITIAN INITIAL EVALUATION ADULT - PERTINENT LABORATORY DATA
04-11    134<L>  |  98  |  36<H>  ----------------------------<  117<H>  4.1   |  25  |  1.06    Ca    8.5      11 Apr 2023 06:55  Phos  3.3     04-11  Mg     2.10     04-11    TPro  5.7<L>  /  Alb  3.0<L>  /  TBili  0.4  /  DBili  x   /  AST  22  /  ALT  17  /  AlkPhos  69  04-11  POCT Blood Glucose.: 141 mg/dL (04-11-23 @ 16:23)  A1C with Estimated Average Glucose Result: 6.3 % (04-05-23 @ 05:20)  A1C with Estimated Average Glucose Result: 5.9 % (05-06-22 @ 06:57)

## 2023-04-11 NOTE — PROGRESS NOTE ADULT - PROBLEM SELECTOR PLAN 1
patient with cardiac amyloidosis and  follows with Dr Laurent.   not been taking some medications due to urinary incontinence when he takes them  self discontinued Tafamidis on 01/01/2023, but restarted in ~ mid March  TTE of 05/2022 significant for severe pulmonary hypertension, but per pulm notes, RHC w/o evidence of pHTN  TTE reviewed - concentric LVH, EF 53%    - Previously started on IV furosemide 40mg BID-> will hold for today  - house cardiology consulted, recs reviewed  - started lisinopril at reduced dose (10mg) daily-> will hold for today due to low BP   - monitor intake/output, weight daily, fluid restriction of 1 liters daily  - continue supplemental oxygen  - concern for afib on RN documentation previously, however, no clear atrial fibrillation on my review; appears NSR. Will follow up cardiology's review of telemetry. HR at goal at this time patient with cardiac amyloidosis and  follows with Dr Laurent.   not been taking some medications due to urinary incontinence when he takes them  self discontinued Tafamidis on 01/01/2023, but restarted in ~ mid March  TTE of 05/2022 significant for severe pulmonary hypertension, but per pulm notes, RHC w/o evidence of pHTN  TTE reviewed - concentric LVH, EF 53%    - Previously started on IV furosemide 40mg BID-> will transition to PO Lasix 40mg daily   - house cardiology consulted, recs reviewed  - started lisinopril at reduced dose (10mg) daily-> will hold for now due to low BP   - monitor intake/output, weight daily, fluid restriction of 1 liters daily  - continue supplemental oxygen  - will attempt TOV tonight

## 2023-04-11 NOTE — PROGRESS NOTE ADULT - SUBJECTIVE AND OBJECTIVE BOX
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 Cardiology consulted for assistance in care.    Patient seen and examined at bedside.    Overnight Events: NAEON. Feeling much better today, states he may be going to rehab soon to recover. Denies FC NV CP SOB. No LE edema orthopnea or PND. No longer on lasix     Telemetry: NSR    Current Meds:  acetaminophen     Tablet .. 650 milliGRAM(s) Oral every 6 hours PRN  dextrose 5%. 1000 milliLiter(s) IV Continuous <Continuous>  dextrose 5%. 1000 milliLiter(s) IV Continuous <Continuous>  dextrose 50% Injectable 25 Gram(s) IV Push once  dextrose 50% Injectable 12.5 Gram(s) IV Push once  dextrose 50% Injectable 25 Gram(s) IV Push once  dextrose Oral Gel 15 Gram(s) Oral once PRN  gabapentin 100 milliGRAM(s) Oral at bedtime  glucagon  Injectable 1 milliGRAM(s) IntraMuscular once  heparin   Injectable 5000 Unit(s) SubCutaneous every 12 hours  insulin glargine Injectable (LANTUS) 3 Unit(s) SubCutaneous at bedtime  insulin lispro (ADMELOG) corrective regimen sliding scale   SubCutaneous three times a day before meals  insulin lispro (ADMELOG) corrective regimen sliding scale   SubCutaneous at bedtime  pantoprazole    Tablet 40 milliGRAM(s) Oral before breakfast  simvastatin 20 milliGRAM(s) Oral at bedtime      Vitals:  T(F): 98 (04-11), Max: 98.6 (04-10)  HR: 88 (04-11) (82 - 97)  BP: 111/58 (04-11) (94/57 - 111/58)  RR: 18 (04-11)  SpO2: 100% (04-11)  I&O's Summary    10 Apr 2023 07:01  -  11 Apr 2023 07:00  --------------------------------------------------------  IN: 480 mL / OUT: 975 mL / NET: -495 mL        Physical Exam:  Appearance: No acute distress; well appearing  Eyes: PERRL, EOMI, pink conjunctiva  HEENT: Normal oral mucosa  Cardiovascular: RRR, S1, S2, no murmurs, rubs, or gallops; no edema; no JVD  Respiratory: Clear to auscultation bilaterally  Gastrointestinal: soft, non-tender, non-distended with normal bowel sounds  Musculoskeletal: No clubbing; no joint deformity   Neurologic: Non-focal  Lymphatic: No lymphadenopathy  Psychiatry: AAOx3, mood & affect appropriate  Skin: No rashes, ecchymoses, or cyanosis                          11.0   5.41  )-----------( 272      ( 11 Apr 2023 06:55 )             33.4     04-11    134<L>  |  98  |  36<H>  ----------------------------<  117<H>  4.1   |  25  |  1.06    Ca    8.5      11 Apr 2023 06:55  Phos  3.3     04-11  Mg     2.10     04-11    TPro  5.7<L>  /  Alb  3.0<L>  /  TBili  0.4  /  DBili  x   /  AST  22  /  ALT  17  /  AlkPhos  69  04-11      CARDIAC MARKERS ( 05 Apr 2023 05:20 )  67 ng/L / x     / x     / x     / x     / x      CARDIAC MARKERS ( 04 Apr 2023 20:00 )  63 ng/L / x     / x     / x     / x     / x      CARDIAC MARKERS ( 04 Apr 2023 17:20 )  65 ng/L / x     / x     / x     / x     / x        A/P    82M w/ hx of cardiac amyloidosis, pHTN, DMII, prostate ca (s/p radiation seeds), presenting with dyspnea on exertion, 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 Cardiology consulted for assistance in care.    #ADHF: hx of cardiac amyloidosis, follows with Dr. Laurent  - Now euvolemic on exam  - Primary team stopped IV lasix    > Woud keep pt on maintenance lasix 40 PO QDay  - monitor strict I/O's, daily standing weights  - replete K4Mg2  - c/w tele monitoring  - c/w home lisinopril 20mg daily  - c/w home simvastatin 20mg daily    Pt euvolemic and well appearing, cardiology to sign off at this time. Please endure F/U obtained with Dr. Laurent upon discharge    Dave Muniz PGY4  Cardiology Fellow    GALLO Muniz PGY4  Cardiology Fellow    GALLO

## 2023-04-11 NOTE — PROGRESS NOTE ADULT - SUBJECTIVE AND OBJECTIVE BOX
Patient is a 82y old  Male who presents with a chief complaint of Shortness of breath (11 Apr 2023 14:04)      SUBJECTIVE / OVERNIGHT EVENTS:    No events overnight. This AM, patient without n/v/d/cp/sob.      MEDICATIONS  (STANDING):  dextrose 5%. 1000 milliLiter(s) (50 mL/Hr) IV Continuous <Continuous>  dextrose 5%. 1000 milliLiter(s) (100 mL/Hr) IV Continuous <Continuous>  dextrose 50% Injectable 25 Gram(s) IV Push once  dextrose 50% Injectable 12.5 Gram(s) IV Push once  dextrose 50% Injectable 25 Gram(s) IV Push once  gabapentin 100 milliGRAM(s) Oral at bedtime  glucagon  Injectable 1 milliGRAM(s) IntraMuscular once  heparin   Injectable 5000 Unit(s) SubCutaneous every 12 hours  insulin glargine Injectable (LANTUS) 3 Unit(s) SubCutaneous at bedtime  insulin lispro (ADMELOG) corrective regimen sliding scale   SubCutaneous three times a day before meals  insulin lispro (ADMELOG) corrective regimen sliding scale   SubCutaneous at bedtime  pantoprazole    Tablet 40 milliGRAM(s) Oral before breakfast  simvastatin 20 milliGRAM(s) Oral at bedtime    MEDICATIONS  (PRN):  acetaminophen     Tablet .. 650 milliGRAM(s) Oral every 6 hours PRN Mild Pain (1 - 3)  dextrose Oral Gel 15 Gram(s) Oral once PRN Blood Glucose LESS THAN 70 milliGRAM(s)/deciliter      PHYSICAL EXAM:  T(C): 36.7 (04-11-23 @ 10:22), Max: 37 (04-10-23 @ 16:15)  HR: 88 (04-11-23 @ 10:22) (82 - 97)  BP: 111/58 (04-11-23 @ 10:22) (94/57 - 111/58)  RR: 18 (04-11-23 @ 10:22) (17 - 18)  SpO2: 100% (04-11-23 @ 10:22) (99% - 100%)  I&O's Summary    10 Apr 2023 07:01  -  11 Apr 2023 07:00  --------------------------------------------------------  IN: 480 mL / OUT: 975 mL / NET: -495 mL      GENERAL: NAD, well-developed  HEAD:  Atraumatic, Normocephalic, MMM  CHEST/LUNG: No use of accessory muscles, CTAB, breathing non-labored  COR: RR, no mrcg  ABD: Soft, ND/NT, +BS  PSYCH: AAOx3  NEUROLOGY: CN II-XII grossly intact, moving all extremities  SKIN: No rashes or lesions  EXT: wwp, no cce    LABS:  CAPILLARY BLOOD GLUCOSE      POCT Blood Glucose.: 138 mg/dL (11 Apr 2023 11:27)  POCT Blood Glucose.: 109 mg/dL (11 Apr 2023 07:24)  POCT Blood Glucose.: 154 mg/dL (10 Apr 2023 22:23)  POCT Blood Glucose.: 115 mg/dL (10 Apr 2023 16:33)                          11.0   5.41  )-----------( 272      ( 11 Apr 2023 06:55 )             33.4     04-11    134<L>  |  98  |  36<H>  ----------------------------<  117<H>  4.1   |  25  |  1.06    Ca    8.5      11 Apr 2023 06:55  Phos  3.3     04-11  Mg     2.10     04-11    TPro  5.7<L>  /  Alb  3.0<L>  /  TBili  0.4  /  DBili  x   /  AST  22  /  ALT  17  /  AlkPhos  69  04-11                RADIOLOGY & ADDITIONAL TESTS:    Telemetry Personally Reviewed -     Imaging Personally Reviewed -     Imaging Reviewed -     Consultant(s) Notes Reviewed -       Care Discussed with Consultants/Other Providers -  Patient is a 82y old  Male who presents with a chief complaint of Shortness of breath (11 Apr 2023 14:04)      SUBJECTIVE / OVERNIGHT EVENTS:    No events overnight. This AM, patient without n/v/d/cp/sob.  Patient reports feeling sad and misses his family. Otherwise denies any acute complaints.   Denies SI/HI.     MEDICATIONS  (STANDING):  dextrose 5%. 1000 milliLiter(s) (50 mL/Hr) IV Continuous <Continuous>  dextrose 5%. 1000 milliLiter(s) (100 mL/Hr) IV Continuous <Continuous>  dextrose 50% Injectable 25 Gram(s) IV Push once  dextrose 50% Injectable 12.5 Gram(s) IV Push once  dextrose 50% Injectable 25 Gram(s) IV Push once  gabapentin 100 milliGRAM(s) Oral at bedtime  glucagon  Injectable 1 milliGRAM(s) IntraMuscular once  heparin   Injectable 5000 Unit(s) SubCutaneous every 12 hours  insulin glargine Injectable (LANTUS) 3 Unit(s) SubCutaneous at bedtime  insulin lispro (ADMELOG) corrective regimen sliding scale   SubCutaneous three times a day before meals  insulin lispro (ADMELOG) corrective regimen sliding scale   SubCutaneous at bedtime  pantoprazole    Tablet 40 milliGRAM(s) Oral before breakfast  simvastatin 20 milliGRAM(s) Oral at bedtime    MEDICATIONS  (PRN):  acetaminophen     Tablet .. 650 milliGRAM(s) Oral every 6 hours PRN Mild Pain (1 - 3)  dextrose Oral Gel 15 Gram(s) Oral once PRN Blood Glucose LESS THAN 70 milliGRAM(s)/deciliter      PHYSICAL EXAM:  T(C): 36.7 (04-11-23 @ 10:22), Max: 37 (04-10-23 @ 16:15)  HR: 88 (04-11-23 @ 10:22) (82 - 97)  BP: 111/58 (04-11-23 @ 10:22) (94/57 - 111/58)  RR: 18 (04-11-23 @ 10:22) (17 - 18)  SpO2: 100% (04-11-23 @ 10:22) (99% - 100%)  I&O's Summary    10 Apr 2023 07:01  -  11 Apr 2023 07:00  --------------------------------------------------------  IN: 480 mL / OUT: 975 mL / NET: -495 mL      GENERAL: NAD, elderly male resting in bed   HEAD:  Atraumatic, Normocephalic, MMM  CHEST/LUNG: No use of accessory muscles, CTAB, breathing non-labored  COR: RR, no mrcg  ABD: Soft, ND/NT, +BS  PSYCH: AAOx3  NEUROLOGY: CN II-XII grossly intact, moving all extremities  SKIN: No rashes or lesions  EXT: no LE edema noted B/L     LABS:  CAPILLARY BLOOD GLUCOSE      POCT Blood Glucose.: 138 mg/dL (11 Apr 2023 11:27)  POCT Blood Glucose.: 109 mg/dL (11 Apr 2023 07:24)  POCT Blood Glucose.: 154 mg/dL (10 Apr 2023 22:23)  POCT Blood Glucose.: 115 mg/dL (10 Apr 2023 16:33)                          11.0   5.41  )-----------( 272      ( 11 Apr 2023 06:55 )             33.4     04-11    134<L>  |  98  |  36<H>  ----------------------------<  117<H>  4.1   |  25  |  1.06    Ca    8.5      11 Apr 2023 06:55  Phos  3.3     04-11  Mg     2.10     04-11    TPro  5.7<L>  /  Alb  3.0<L>  /  TBili  0.4  /  DBili  x   /  AST  22  /  ALT  17  /  AlkPhos  69  04-11                RADIOLOGY & ADDITIONAL TESTS:    Telemetry Personally Reviewed - NSR    Imaging Personally Reviewed -     Imaging Reviewed -     Consultant(s) Notes Reviewed -       Care Discussed with Consultants/Other Providers -

## 2023-04-11 NOTE — PROGRESS NOTE ADULT - ASSESSMENT
82 year old male, w/ Hx  DM2, Prostate cancer - s/p radiation seeds implant, and Meniere's disease, severe Pulmonary hypertension, Chronic lower extremities edema presented with  dyspnea on exertion.   Admitted for further evaluation.

## 2023-04-11 NOTE — DIETITIAN INITIAL EVALUATION ADULT - OTHER INFO
82 year old male, w/ Hx  DM2, Prostate cancer - s/p radiation seeds implant, and Meniere's disease, severe Pulmonary hypertension, Chronic lower extremities edema presented with  dyspnea on exertion. Admitted for further evaluation, per chart.     Patient reports good appetite during stay. As per observation during visit, patient consumed >75% of lunch. Patient is able to feed self with tray setup. Denies any difficulty chewing/swallowing, any nausea, vomiting, diarrhea, constipation during visit. Reports last bowel movement 4/11/2023. Current weight: 61.8kg /136.2lbs (4/11/2023, per RN flow sheet). Compared to the reported usual body weight 150lbs, noted with fluid related weight loss of -13.8lbs/-9.2%BW x 1week. Noted patient has 1+ generalized edema and 1+ edema to left leg, per RN flow sheet. Fluid shift might cause weight changes, will continue to monitor weight trend. Noted [id number]-9.3% (4/5/2023), RD provided verbal and written nutrition education on consistent carb, heart healthy nutrition therapy, including source of carbohydrates, nutrition labels, Plate Method. Encouraged patient to avoid food high in saturated/trans fat, sodium, concentrated sweets. Patient is receptive to information provided.

## 2023-04-11 NOTE — DIETITIAN INITIAL EVALUATION ADULT - ADD RECOMMEND
1. Monitor weight, labs, po intake and tolerance, bowel movement, skin integrity.  2. Encourage PO intake and honor food preferences as able.

## 2023-04-11 NOTE — DIETITIAN INITIAL EVALUATION ADULT - PERTINENT MEDS FT
MEDICATIONS  (STANDING):  dextrose 5%. 1000 milliLiter(s) (50 mL/Hr) IV Continuous <Continuous>  dextrose 5%. 1000 milliLiter(s) (100 mL/Hr) IV Continuous <Continuous>  dextrose 50% Injectable 25 Gram(s) IV Push once  dextrose 50% Injectable 12.5 Gram(s) IV Push once  dextrose 50% Injectable 25 Gram(s) IV Push once  furosemide    Tablet 40 milliGRAM(s) Oral daily  gabapentin 100 milliGRAM(s) Oral at bedtime  glucagon  Injectable 1 milliGRAM(s) IntraMuscular once  heparin   Injectable 5000 Unit(s) SubCutaneous every 12 hours  insulin glargine Injectable (LANTUS) 3 Unit(s) SubCutaneous at bedtime  insulin lispro (ADMELOG) corrective regimen sliding scale   SubCutaneous three times a day before meals  insulin lispro (ADMELOG) corrective regimen sliding scale   SubCutaneous at bedtime  pantoprazole    Tablet 40 milliGRAM(s) Oral before breakfast  simvastatin 20 milliGRAM(s) Oral at bedtime    MEDICATIONS  (PRN):  acetaminophen     Tablet .. 650 milliGRAM(s) Oral every 6 hours PRN Mild Pain (1 - 3)  dextrose Oral Gel 15 Gram(s) Oral once PRN Blood Glucose LESS THAN 70 milliGRAM(s)/deciliter

## 2023-04-11 NOTE — PROGRESS NOTE ADULT - ATTENDING COMMENTS
The patient was seen and examined with the Cardiology Consultation Teaching Service.     No overnight events    No chest pain  No dyspnea, orthopnea or PND  No palpitations or dizziness     The patient reports feeling fairly well    PMH/PSH:  Cardiac amyloidosis  Pulmonary hypertension  Diabetes  Prostate cancer    Comfortable-appearing man in no acute distress  Alert and oriented  Afebrile  Vital signs stable  JVP is not elevated  Clear lungs  Normal heart sounds  Extremities are warm and perfused  No peripheral edema     Stable normocytic anemia  Mild hyponatremia  GFR 71    Impression and Recommendations:   82-year-old man with cardiac amyloidosis who presented with increasing dyspnea on exertion found to be in acute decompensated heart failure.     The patient appears euvolemic at this time, and his symptoms have resolved. I agree with the above recommendations that I would be very reluctant to discontinue furosemide completely, and that the patient will require some amount of maintenance diuretic. The patient admits that he discontinued his diuretics after the new year, which resulted in this hospitalization. He was previously taking furosemide 40mg daily.     Please continue outpatient lisinopril and simvastatin.  Please call cardiology with additional questions or concerns.    Frankie Sanders MD  Cardiology  [phone]
Pedro Jurado is an 82-year-old man with history of cardiac amyloidosis, pHTN, DMII and prostate ca (s/p radiation seeds). He presented with dyspnea on exertion due to ADHF (acute HFpEF) in the setting of cardiac amyloidosis, (followed by Dr. Laurent at [facility]). TTE 4/6/23 noted Low normal left ventricular systolic function.  Severe concentric left ventricular hypertrophy. LVEF 53%. Normal left ventricular diastolic function. Nevertheless, there is evidence of volume overload on examination. Initial plan is to start furosemide (Lasix) 40mg IV BID, with goal net negative fluid balance 1-2L. Maintain serum potassium > 4.0 mmol/L and serum magnesium > 2.0 mg/dL. Maintain lisinopril 20 mg daily and simvastatin 20 mg daily at bedtime

## 2023-04-11 NOTE — DIETITIAN INITIAL EVALUATION ADULT - DIET TYPE
Fluid needs per MD discretion./DASH/TLC (sodium and cholesterol restricted diet)/consistent carbohydrate (evening snack)

## 2023-04-11 NOTE — DIETITIAN INITIAL EVALUATION ADULT - ORAL INTAKE PTA/DIET HISTORY
Patient reports usual body weight 150lbs, 1 week ago. Patient has no known food allergies, does not follow any special diet at home. Patient denies any weight loss or appetite change, denies any difficulty obtaining food/preparing meals prior to hospitalization.

## 2023-04-11 NOTE — DIETITIAN INITIAL EVALUATION ADULT - NSICDXPASTMEDICALHX_GEN_ALL_CORE_FT
PAST MEDICAL HISTORY:  Diabetes mellitus, type 2     Edema of both lower legs     Malignant neoplasm of prostate     Meniere disease     Severe pulmonary hypertension

## 2023-04-11 NOTE — DIETITIAN INITIAL EVALUATION ADULT - NS FNS DIET ORDER
Diet, Regular:   Consistent Carbohydrate {Evening Snack} (CSTCHOSN)  DASH/TLC {Sodium & Cholesterol Restricted} (DASH)  Low Fat (LOWFAT)  1000mL Fluid Restriction ([id number]) (04-05-23 @ 06:12)

## 2023-04-11 NOTE — PROGRESS NOTE ADULT - PROBLEM SELECTOR PLAN 3
A1c 6.3%, on metformin PTA (held)  - Continue Lantus/ISS, per FS  - consistent carb diet  - monitoring FS A1c 6.3%, on metformin PTA (held)    - Continue Lantus/ ISS, per FS  - consistent carb diet  - monitoring FS

## 2023-04-11 NOTE — PROGRESS NOTE ADULT - PROBLEM SELECTOR PLAN 2
pHTN noted on TTE of 05/2022, but per pulm notes, no pHTN noted on RHC    - Cardiology consulted as above pHTN noted on TTE of 05/2022, but per pulm notes, no pHTN noted on RHC    - Cardiology consulted as above  - will need outpt follow up with Pulm

## 2023-04-12 LAB
ANION GAP SERPL CALC-SCNC: 10 MMOL/L — SIGNIFICANT CHANGE UP (ref 7–14)
BUN SERPL-MCNC: 40 MG/DL — HIGH (ref 7–23)
CALCIUM SERPL-MCNC: 8.8 MG/DL — SIGNIFICANT CHANGE UP (ref 8.4–10.5)
CHLORIDE SERPL-SCNC: 101 MMOL/L — SIGNIFICANT CHANGE UP (ref 98–107)
CO2 SERPL-SCNC: 25 MMOL/L — SIGNIFICANT CHANGE UP (ref 22–31)
CREAT SERPL-MCNC: 1.15 MG/DL — SIGNIFICANT CHANGE UP (ref 0.5–1.3)
EGFR: 64 ML/MIN/1.73M2 — SIGNIFICANT CHANGE UP
GLUCOSE BLDC GLUCOMTR-MCNC: 106 MG/DL — HIGH (ref 70–99)
GLUCOSE BLDC GLUCOMTR-MCNC: 115 MG/DL — HIGH (ref 70–99)
GLUCOSE BLDC GLUCOMTR-MCNC: 145 MG/DL — HIGH (ref 70–99)
GLUCOSE BLDC GLUCOMTR-MCNC: 258 MG/DL — HIGH (ref 70–99)
GLUCOSE BLDC GLUCOMTR-MCNC: 273 MG/DL — HIGH (ref 70–99)
GLUCOSE BLDC GLUCOMTR-MCNC: 276 MG/DL — HIGH (ref 70–99)
GLUCOSE SERPL-MCNC: 113 MG/DL — HIGH (ref 70–99)
HCT VFR BLD CALC: 34.6 % — LOW (ref 39–50)
HGB BLD-MCNC: 11.2 G/DL — LOW (ref 13–17)
MAGNESIUM SERPL-MCNC: 2 MG/DL — SIGNIFICANT CHANGE UP (ref 1.6–2.6)
MCHC RBC-ENTMCNC: 26.3 PG — LOW (ref 27–34)
MCHC RBC-ENTMCNC: 32.4 GM/DL — SIGNIFICANT CHANGE UP (ref 32–36)
MCV RBC AUTO: 81.2 FL — SIGNIFICANT CHANGE UP (ref 80–100)
NRBC # BLD: 0 /100 WBCS — SIGNIFICANT CHANGE UP (ref 0–0)
NRBC # FLD: 0 K/UL — SIGNIFICANT CHANGE UP (ref 0–0)
PHOSPHATE SERPL-MCNC: 3.5 MG/DL — SIGNIFICANT CHANGE UP (ref 2.5–4.5)
PLATELET # BLD AUTO: 301 K/UL — SIGNIFICANT CHANGE UP (ref 150–400)
POTASSIUM SERPL-MCNC: 4.4 MMOL/L — SIGNIFICANT CHANGE UP (ref 3.5–5.3)
POTASSIUM SERPL-SCNC: 4.4 MMOL/L — SIGNIFICANT CHANGE UP (ref 3.5–5.3)
RBC # BLD: 4.26 M/UL — SIGNIFICANT CHANGE UP (ref 4.2–5.8)
RBC # FLD: 15.7 % — HIGH (ref 10.3–14.5)
SARS-COV-2 RNA SPEC QL NAA+PROBE: SIGNIFICANT CHANGE UP
SODIUM SERPL-SCNC: 136 MMOL/L — SIGNIFICANT CHANGE UP (ref 135–145)
WBC # BLD: 5.6 K/UL — SIGNIFICANT CHANGE UP (ref 3.8–10.5)
WBC # FLD AUTO: 5.6 K/UL — SIGNIFICANT CHANGE UP (ref 3.8–10.5)

## 2023-04-12 PROCEDURE — 99232 SBSQ HOSP IP/OBS MODERATE 35: CPT

## 2023-04-12 RX ADMIN — Medication 1: at 21:50

## 2023-04-12 RX ADMIN — SIMVASTATIN 20 MILLIGRAM(S): 20 TABLET, FILM COATED ORAL at 21:22

## 2023-04-12 RX ADMIN — HEPARIN SODIUM 5000 UNIT(S): 5000 INJECTION INTRAVENOUS; SUBCUTANEOUS at 17:22

## 2023-04-12 RX ADMIN — GABAPENTIN 100 MILLIGRAM(S): 400 CAPSULE ORAL at 21:23

## 2023-04-12 RX ADMIN — PANTOPRAZOLE SODIUM 40 MILLIGRAM(S): 20 TABLET, DELAYED RELEASE ORAL at 05:30

## 2023-04-12 RX ADMIN — Medication 40 MILLIGRAM(S): at 05:30

## 2023-04-12 RX ADMIN — HEPARIN SODIUM 5000 UNIT(S): 5000 INJECTION INTRAVENOUS; SUBCUTANEOUS at 05:30

## 2023-04-12 RX ADMIN — INSULIN GLARGINE 3 UNIT(S): 100 INJECTION, SOLUTION SUBCUTANEOUS at 21:50

## 2023-04-12 NOTE — PROGRESS NOTE ADULT - PROBLEM SELECTOR PLAN 3
A1c 6.3%, on metformin PTA (held)    - Continue Lantus/ ISS, per FS  - consistent carb diet  - monitoring FS

## 2023-04-12 NOTE — PROGRESS NOTE ADULT - SUBJECTIVE AND OBJECTIVE BOX
Patient is a 82y old  Male who presents with a chief complaint of Shortness of breath (11 Apr 2023 14:13)      SUBJECTIVE / OVERNIGHT EVENTS:    No events overnight. This AM, patient without n/v/d/cp/sob.      MEDICATIONS  (STANDING):  dextrose 5%. 1000 milliLiter(s) (50 mL/Hr) IV Continuous <Continuous>  dextrose 5%. 1000 milliLiter(s) (100 mL/Hr) IV Continuous <Continuous>  dextrose 50% Injectable 25 Gram(s) IV Push once  dextrose 50% Injectable 12.5 Gram(s) IV Push once  dextrose 50% Injectable 25 Gram(s) IV Push once  furosemide    Tablet 40 milliGRAM(s) Oral daily  gabapentin 100 milliGRAM(s) Oral at bedtime  glucagon  Injectable 1 milliGRAM(s) IntraMuscular once  heparin   Injectable 5000 Unit(s) SubCutaneous every 12 hours  insulin glargine Injectable (LANTUS) 3 Unit(s) SubCutaneous at bedtime  insulin lispro (ADMELOG) corrective regimen sliding scale   SubCutaneous three times a day before meals  insulin lispro (ADMELOG) corrective regimen sliding scale   SubCutaneous at bedtime  pantoprazole    Tablet 40 milliGRAM(s) Oral before breakfast  simvastatin 20 milliGRAM(s) Oral at bedtime    MEDICATIONS  (PRN):  acetaminophen     Tablet .. 650 milliGRAM(s) Oral every 6 hours PRN Mild Pain (1 - 3)  dextrose Oral Gel 15 Gram(s) Oral once PRN Blood Glucose LESS THAN 70 milliGRAM(s)/deciliter      PHYSICAL EXAM:  T(C): 36.2 (04-12-23 @ 09:20), Max: 37 (04-11-23 @ 16:20)  HR: 86 (04-12-23 @ 09:20) (83 - 93)  BP: 101/54 (04-12-23 @ 09:20) (101/54 - 113/69)  RR: 18 (04-12-23 @ 09:20) (16 - 18)  SpO2: 100% (04-12-23 @ 09:20) (98% - 100%)  I&O's Summary    11 Apr 2023 07:01  -  12 Apr 2023 07:00  --------------------------------------------------------  IN: 480 mL / OUT: 1650 mL / NET: -1170 mL    12 Apr 2023 07:01  -  12 Apr 2023 16:01  --------------------------------------------------------  IN: 0 mL / OUT: 350 mL / NET: -350 mL      GENERAL: NAD, well-developed  HEAD:  Atraumatic, Normocephalic, MMM  CHEST/LUNG: No use of accessory muscles, CTAB, breathing non-labored  COR: RR, no mrcg  ABD: Soft, ND/NT, +BS  PSYCH: AAOx3  NEUROLOGY: CN II-XII grossly intact, moving all extremities  SKIN: No rashes or lesions  EXT: wwp, no cce    LABS:  CAPILLARY BLOOD GLUCOSE      POCT Blood Glucose.: 145 mg/dL (12 Apr 2023 11:26)  POCT Blood Glucose.: 115 mg/dL (12 Apr 2023 07:18)  POCT Blood Glucose.: 134 mg/dL (11 Apr 2023 22:32)  POCT Blood Glucose.: 141 mg/dL (11 Apr 2023 16:23)                          11.2   5.60  )-----------( 301      ( 12 Apr 2023 04:53 )             34.6     04-12    136  |  101  |  40<H>  ----------------------------<  113<H>  4.4   |  25  |  1.15    Ca    8.8      12 Apr 2023 04:53  Phos  3.5     04-12  Mg     2.00     04-12    TPro  5.7<L>  /  Alb  3.0<L>  /  TBili  0.4  /  DBili  x   /  AST  22  /  ALT  17  /  AlkPhos  69  04-11                RADIOLOGY & ADDITIONAL TESTS:    Telemetry Personally Reviewed -     Imaging Personally Reviewed -     Imaging Reviewed -     Consultant(s) Notes Reviewed -       Care Discussed with Consultants/Other Providers -  Patient is a 82y old  Male who presents with a chief complaint of Shortness of breath (11 Apr 2023 14:13)      SUBJECTIVE / OVERNIGHT EVENTS:    No events overnight. This AM, patient without n/v/d/cp/sob.  Pt reports feeling well and denies any acute complaints at this time.     MEDICATIONS  (STANDING):  dextrose 5%. 1000 milliLiter(s) (50 mL/Hr) IV Continuous <Continuous>  dextrose 5%. 1000 milliLiter(s) (100 mL/Hr) IV Continuous <Continuous>  dextrose 50% Injectable 25 Gram(s) IV Push once  dextrose 50% Injectable 12.5 Gram(s) IV Push once  dextrose 50% Injectable 25 Gram(s) IV Push once  furosemide    Tablet 40 milliGRAM(s) Oral daily  gabapentin 100 milliGRAM(s) Oral at bedtime  glucagon  Injectable 1 milliGRAM(s) IntraMuscular once  heparin   Injectable 5000 Unit(s) SubCutaneous every 12 hours  insulin glargine Injectable (LANTUS) 3 Unit(s) SubCutaneous at bedtime  insulin lispro (ADMELOG) corrective regimen sliding scale   SubCutaneous three times a day before meals  insulin lispro (ADMELOG) corrective regimen sliding scale   SubCutaneous at bedtime  pantoprazole    Tablet 40 milliGRAM(s) Oral before breakfast  simvastatin 20 milliGRAM(s) Oral at bedtime    MEDICATIONS  (PRN):  acetaminophen     Tablet .. 650 milliGRAM(s) Oral every 6 hours PRN Mild Pain (1 - 3)  dextrose Oral Gel 15 Gram(s) Oral once PRN Blood Glucose LESS THAN 70 milliGRAM(s)/deciliter      PHYSICAL EXAM:  T(C): 36.2 (04-12-23 @ 09:20), Max: 37 (04-11-23 @ 16:20)  HR: 86 (04-12-23 @ 09:20) (83 - 93)  BP: 101/54 (04-12-23 @ 09:20) (101/54 - 113/69)  RR: 18 (04-12-23 @ 09:20) (16 - 18)  SpO2: 100% (04-12-23 @ 09:20) (98% - 100%)  I&O's Summary    11 Apr 2023 07:01  -  12 Apr 2023 07:00  --------------------------------------------------------  IN: 480 mL / OUT: 1650 mL / NET: -1170 mL    12 Apr 2023 07:01  -  12 Apr 2023 16:01  --------------------------------------------------------  IN: 0 mL / OUT: 350 mL / NET: -350 mL      GENERAL: NAD, elderly male resting in bed   HEAD:  Atraumatic, Normocephalic, MMM  CHEST/LUNG: No use of accessory muscles, CTAB, breathing non-labored  COR: RR, no mrcg  ABD: Soft, ND/NT, +BS  PSYCH: AAOx3  NEUROLOGY: CN II-XII grossly intact, moving all extremities  SKIN: No rashes or lesions  EXT: no LE edema noted B/L     LABS:  CAPILLARY BLOOD GLUCOSE      POCT Blood Glucose.: 145 mg/dL (12 Apr 2023 11:26)  POCT Blood Glucose.: 115 mg/dL (12 Apr 2023 07:18)  POCT Blood Glucose.: 134 mg/dL (11 Apr 2023 22:32)  POCT Blood Glucose.: 141 mg/dL (11 Apr 2023 16:23)                          11.2   5.60  )-----------( 301      ( 12 Apr 2023 04:53 )             34.6     04-12    136  |  101  |  40<H>  ----------------------------<  113<H>  4.4   |  25  |  1.15    Ca    8.8      12 Apr 2023 04:53  Phos  3.5     04-12  Mg     2.00     04-12    TPro  5.7<L>  /  Alb  3.0<L>  /  TBili  0.4  /  DBili  x   /  AST  22  /  ALT  17  /  AlkPhos  69  04-11                RADIOLOGY & ADDITIONAL TESTS:    Telemetry Personally Reviewed -     Imaging Personally Reviewed -     Imaging Reviewed -     Consultant(s) Notes Reviewed -       Care Discussed with Consultants/Other Providers -

## 2023-04-12 NOTE — PROGRESS NOTE ADULT - PROBLEM SELECTOR PLAN 2
pHTN noted on TTE of 05/2022, but per pulm notes, no pHTN noted on RHC    - Cardiology consulted as above  - will need outpt follow up with Pulm

## 2023-04-12 NOTE — PROGRESS NOTE ADULT - PROBLEM SELECTOR PLAN 1
patient with cardiac amyloidosis and  follows with Dr Laurent.   not been taking some medications due to urinary incontinence when he takes them  self discontinued Tafamidis on 01/01/2023, but restarted in ~ mid March  TTE of 05/2022 significant for severe pulmonary hypertension, but per pulm notes, RHC w/o evidence of pHTN  TTE reviewed - concentric LVH, EF 53%    - Previously started on IV furosemide 40mg BID-> will transition to PO Lasix 40mg daily   - house cardiology consulted, recs reviewed  - started lisinopril at reduced dose (10mg) daily-> will hold for now due to low BP   - monitor intake/output, weight daily, fluid restriction of 1 liters daily  - continue supplemental oxygen  - will attempt TOV tonight patient with cardiac amyloidosis and  follows with Dr Laurent.   not been taking some medications due to urinary incontinence when he takes them  self discontinued Tafamidis on 01/01/2023, but restarted in ~ mid March  TTE of 05/2022 significant for severe pulmonary hypertension, but per pulm notes, RHC w/o evidence of pHTN  TTE reviewed - concentric LVH, EF 53%    - Previously started on IV furosemide 40mg BID-> will transition to PO Lasix 40mg daily   - Orthostatics ordered and follow up   - house cardiology consulted, recs reviewed  - started lisinopril at reduced dose (10mg) daily-> will hold for now due to low BP   - monitor intake/output, weight daily, fluid restriction of 1 liters daily  - continue supplemental oxygen  - had TOV on 4/11 and passed. Now voiding well

## 2023-04-13 LAB
ANION GAP SERPL CALC-SCNC: 10 MMOL/L — SIGNIFICANT CHANGE UP (ref 7–14)
BUN SERPL-MCNC: 35 MG/DL — HIGH (ref 7–23)
CALCIUM SERPL-MCNC: 9 MG/DL — SIGNIFICANT CHANGE UP (ref 8.4–10.5)
CHLORIDE SERPL-SCNC: 102 MMOL/L — SIGNIFICANT CHANGE UP (ref 98–107)
CO2 SERPL-SCNC: 25 MMOL/L — SIGNIFICANT CHANGE UP (ref 22–31)
CREAT SERPL-MCNC: 1.03 MG/DL — SIGNIFICANT CHANGE UP (ref 0.5–1.3)
EGFR: 73 ML/MIN/1.73M2 — SIGNIFICANT CHANGE UP
GLUCOSE BLDC GLUCOMTR-MCNC: 109 MG/DL — HIGH (ref 70–99)
GLUCOSE BLDC GLUCOMTR-MCNC: 124 MG/DL — HIGH (ref 70–99)
GLUCOSE BLDC GLUCOMTR-MCNC: 132 MG/DL — HIGH (ref 70–99)
GLUCOSE BLDC GLUCOMTR-MCNC: 206 MG/DL — HIGH (ref 70–99)
GLUCOSE SERPL-MCNC: 94 MG/DL — SIGNIFICANT CHANGE UP (ref 70–99)
HCT VFR BLD CALC: 35.6 % — LOW (ref 39–50)
HGB BLD-MCNC: 11.4 G/DL — LOW (ref 13–17)
MAGNESIUM SERPL-MCNC: 2 MG/DL — SIGNIFICANT CHANGE UP (ref 1.6–2.6)
MCHC RBC-ENTMCNC: 26.3 PG — LOW (ref 27–34)
MCHC RBC-ENTMCNC: 32 GM/DL — SIGNIFICANT CHANGE UP (ref 32–36)
MCV RBC AUTO: 82 FL — SIGNIFICANT CHANGE UP (ref 80–100)
NRBC # BLD: 0 /100 WBCS — SIGNIFICANT CHANGE UP (ref 0–0)
NRBC # FLD: 0 K/UL — SIGNIFICANT CHANGE UP (ref 0–0)
PHOSPHATE SERPL-MCNC: 3.6 MG/DL — SIGNIFICANT CHANGE UP (ref 2.5–4.5)
PLATELET # BLD AUTO: 298 K/UL — SIGNIFICANT CHANGE UP (ref 150–400)
POTASSIUM SERPL-MCNC: 4.2 MMOL/L — SIGNIFICANT CHANGE UP (ref 3.5–5.3)
POTASSIUM SERPL-SCNC: 4.2 MMOL/L — SIGNIFICANT CHANGE UP (ref 3.5–5.3)
RBC # BLD: 4.34 M/UL — SIGNIFICANT CHANGE UP (ref 4.2–5.8)
RBC # FLD: 15.8 % — HIGH (ref 10.3–14.5)
SODIUM SERPL-SCNC: 137 MMOL/L — SIGNIFICANT CHANGE UP (ref 135–145)
WBC # BLD: 4.65 K/UL — SIGNIFICANT CHANGE UP (ref 3.8–10.5)
WBC # FLD AUTO: 4.65 K/UL — SIGNIFICANT CHANGE UP (ref 3.8–10.5)

## 2023-04-13 PROCEDURE — 99232 SBSQ HOSP IP/OBS MODERATE 35: CPT

## 2023-04-13 RX ORDER — SODIUM CHLORIDE 9 MG/ML
250 INJECTION, SOLUTION INTRAVENOUS
Refills: 0 | Status: DISCONTINUED | OUTPATIENT
Start: 2023-04-13 | End: 2023-04-14

## 2023-04-13 RX ADMIN — HEPARIN SODIUM 5000 UNIT(S): 5000 INJECTION INTRAVENOUS; SUBCUTANEOUS at 05:16

## 2023-04-13 RX ADMIN — Medication 40 MILLIGRAM(S): at 05:16

## 2023-04-13 RX ADMIN — PANTOPRAZOLE SODIUM 40 MILLIGRAM(S): 20 TABLET, DELAYED RELEASE ORAL at 05:15

## 2023-04-13 RX ADMIN — SODIUM CHLORIDE 50 MILLILITER(S): 9 INJECTION, SOLUTION INTRAVENOUS at 17:11

## 2023-04-13 RX ADMIN — HEPARIN SODIUM 5000 UNIT(S): 5000 INJECTION INTRAVENOUS; SUBCUTANEOUS at 17:02

## 2023-04-13 RX ADMIN — SIMVASTATIN 20 MILLIGRAM(S): 20 TABLET, FILM COATED ORAL at 21:39

## 2023-04-13 RX ADMIN — INSULIN GLARGINE 3 UNIT(S): 100 INJECTION, SOLUTION SUBCUTANEOUS at 21:39

## 2023-04-13 RX ADMIN — GABAPENTIN 100 MILLIGRAM(S): 400 CAPSULE ORAL at 21:39

## 2023-04-13 NOTE — PROGRESS NOTE ADULT - PROBLEM SELECTOR PLAN 1
patient with cardiac amyloidosis and  follows with Dr Laurent.   not been taking some medications due to urinary incontinence when he takes them  self discontinued Tafamidis on 01/01/2023, but restarted in ~ mid March  TTE of 05/2022 significant for severe pulmonary hypertension, but per pulm notes, RHC w/o evidence of pHTN  TTE reviewed - concentric LVH, EF 53%    - Previously started on IV furosemide 40mg BID-> will transition to PO Lasix 40mg daily   - Orthostatics ordered and follow up   - house cardiology consulted, recs reviewed  - started lisinopril at reduced dose (10mg) daily-> will hold for now due to low BP   - monitor intake/output, weight daily, fluid restriction of 1 liters daily  - continue supplemental oxygen  - had TOV on 4/11 and passed. Now voiding well patient with cardiac amyloidosis and  follows with Dr Laurent.   not been taking some medications due to urinary incontinence when he takes them  self discontinued Tafamidis on 01/01/2023, but restarted in ~ mid March  TTE of 05/2022 significant for severe pulmonary hypertension, but per pulm notes, RHC w/o evidence of pHTN  TTE reviewed - concentric LVH, EF 53%  Noted to have low BP today but remained asymptomatic    - gave gentle IVF- 250cc @50/hr for 5 hrs as discussed with cardiology  - Previously started on IV furosemide 40mg BID-> will transition to PO Lasix 40mg daily   - Orthostatics ordered and follow up -> unremarkable and pt asymptomatic   - house cardiology consulted, recs reviewed  - started lisinopril at reduced dose (10mg) daily-> will hold for now due to low BP   - monitor intake/output, weight daily, fluid restriction of 1 liters daily  - continue supplemental oxygen  - had TOV on 4/11 and passed. Now voiding well

## 2023-04-13 NOTE — PROGRESS NOTE ADULT - SUBJECTIVE AND OBJECTIVE BOX
Patient is a 82y old  Male who presents with a chief complaint of Shortness of breath (12 Apr 2023 14:01)      SUBJECTIVE / OVERNIGHT EVENTS:    No events overnight. This AM, patient without n/v/d/cp/sob.      MEDICATIONS  (STANDING):  dextrose 5%. 1000 milliLiter(s) (100 mL/Hr) IV Continuous <Continuous>  dextrose 5%. 1000 milliLiter(s) (50 mL/Hr) IV Continuous <Continuous>  dextrose 50% Injectable 25 Gram(s) IV Push once  dextrose 50% Injectable 12.5 Gram(s) IV Push once  dextrose 50% Injectable 25 Gram(s) IV Push once  furosemide    Tablet 40 milliGRAM(s) Oral daily  gabapentin 100 milliGRAM(s) Oral at bedtime  glucagon  Injectable 1 milliGRAM(s) IntraMuscular once  heparin   Injectable 5000 Unit(s) SubCutaneous every 12 hours  insulin glargine Injectable (LANTUS) 3 Unit(s) SubCutaneous at bedtime  insulin lispro (ADMELOG) corrective regimen sliding scale   SubCutaneous three times a day before meals  insulin lispro (ADMELOG) corrective regimen sliding scale   SubCutaneous at bedtime  pantoprazole    Tablet 40 milliGRAM(s) Oral before breakfast  simvastatin 20 milliGRAM(s) Oral at bedtime    MEDICATIONS  (PRN):  acetaminophen     Tablet .. 650 milliGRAM(s) Oral every 6 hours PRN Mild Pain (1 - 3)  dextrose Oral Gel 15 Gram(s) Oral once PRN Blood Glucose LESS THAN 70 milliGRAM(s)/deciliter      PHYSICAL EXAM:  T(C): 36.6 (04-13-23 @ 09:00), Max: 36.9 (04-12-23 @ 17:07)  HR: 88 (04-13-23 @ 09:00) (82 - 94)  BP: 99/57 (04-13-23 @ 09:00) (98/67 - 111/64)  RR: 18 (04-13-23 @ 09:00) (17 - 18)  SpO2: 100% (04-13-23 @ 09:00) (100% - 100%)  I&O's Summary    12 Apr 2023 07:01  -  13 Apr 2023 07:00  --------------------------------------------------------  IN: 880 mL / OUT: 1250 mL / NET: -370 mL    13 Apr 2023 07:01  -  13 Apr 2023 14:18  --------------------------------------------------------  IN: 200 mL / OUT: 300 mL / NET: -100 mL      GENERAL: NAD, well-developed  HEAD:  Atraumatic, Normocephalic, MMM  CHEST/LUNG: No use of accessory muscles, CTAB, breathing non-labored  COR: RR, no mrcg  ABD: Soft, ND/NT, +BS  PSYCH: AAOx3  NEUROLOGY: CN II-XII grossly intact, moving all extremities  SKIN: No rashes or lesions  EXT: wwp, no cce    LABS:  CAPILLARY BLOOD GLUCOSE      POCT Blood Glucose.: 132 mg/dL (13 Apr 2023 11:32)  POCT Blood Glucose.: 109 mg/dL (13 Apr 2023 07:27)  POCT Blood Glucose.: 273 mg/dL (12 Apr 2023 21:47)  POCT Blood Glucose.: 276 mg/dL (12 Apr 2023 21:22)  POCT Blood Glucose.: 258 mg/dL (12 Apr 2023 21:19)  POCT Blood Glucose.: 106 mg/dL (12 Apr 2023 16:26)                          11.4   4.65  )-----------( 298      ( 13 Apr 2023 05:05 )             35.6     04-13    137  |  102  |  35<H>  ----------------------------<  94  4.2   |  25  |  1.03    Ca    9.0      13 Apr 2023 05:05  Phos  3.6     04-13  Mg     2.00     04-13                  RADIOLOGY & ADDITIONAL TESTS:    Telemetry Personally Reviewed -     Imaging Personally Reviewed -     Imaging Reviewed -     Consultant(s) Notes Reviewed -       Care Discussed with Consultants/Other Providers -  Patient is a 82y old  Male who presents with a chief complaint of Shortness of breath (12 Apr 2023 14:01)      SUBJECTIVE / OVERNIGHT EVENTS:    No events overnight. This AM, patient without n/v/d/cp/sob.  Pt reports feeling well and has no complaints at this time. Noted to be walking with PT in the hallway.     MEDICATIONS  (STANDING):  dextrose 5%. 1000 milliLiter(s) (100 mL/Hr) IV Continuous <Continuous>  dextrose 5%. 1000 milliLiter(s) (50 mL/Hr) IV Continuous <Continuous>  dextrose 50% Injectable 25 Gram(s) IV Push once  dextrose 50% Injectable 12.5 Gram(s) IV Push once  dextrose 50% Injectable 25 Gram(s) IV Push once  furosemide    Tablet 40 milliGRAM(s) Oral daily  gabapentin 100 milliGRAM(s) Oral at bedtime  glucagon  Injectable 1 milliGRAM(s) IntraMuscular once  heparin   Injectable 5000 Unit(s) SubCutaneous every 12 hours  insulin glargine Injectable (LANTUS) 3 Unit(s) SubCutaneous at bedtime  insulin lispro (ADMELOG) corrective regimen sliding scale   SubCutaneous three times a day before meals  insulin lispro (ADMELOG) corrective regimen sliding scale   SubCutaneous at bedtime  pantoprazole    Tablet 40 milliGRAM(s) Oral before breakfast  simvastatin 20 milliGRAM(s) Oral at bedtime    MEDICATIONS  (PRN):  acetaminophen     Tablet .. 650 milliGRAM(s) Oral every 6 hours PRN Mild Pain (1 - 3)  dextrose Oral Gel 15 Gram(s) Oral once PRN Blood Glucose LESS THAN 70 milliGRAM(s)/deciliter      PHYSICAL EXAM:  T(C): 36.6 (04-13-23 @ 09:00), Max: 36.9 (04-12-23 @ 17:07)  HR: 88 (04-13-23 @ 09:00) (82 - 94)  BP: 99/57 (04-13-23 @ 09:00) (98/67 - 111/64)  RR: 18 (04-13-23 @ 09:00) (17 - 18)  SpO2: 100% (04-13-23 @ 09:00) (100% - 100%)  I&O's Summary    12 Apr 2023 07:01  -  13 Apr 2023 07:00  --------------------------------------------------------  IN: 880 mL / OUT: 1250 mL / NET: -370 mL    13 Apr 2023 07:01  -  13 Apr 2023 14:18  --------------------------------------------------------  IN: 200 mL / OUT: 300 mL / NET: -100 mL      GENERAL: NAD, elderly male resting in chair  HEAD:  Atraumatic, Normocephalic, MMM  CHEST/LUNG: No use of accessory muscles, CTAB, breathing non-labored  COR: RR, no mrcg  ABD: Soft, ND/NT, +BS  PSYCH: AAOx3  NEUROLOGY: CN II-XII grossly intact, moving all extremities  SKIN: No rashes or lesions  EXT: no LE edema noted B/L     LABS:  CAPILLARY BLOOD GLUCOSE      POCT Blood Glucose.: 132 mg/dL (13 Apr 2023 11:32)  POCT Blood Glucose.: 109 mg/dL (13 Apr 2023 07:27)  POCT Blood Glucose.: 273 mg/dL (12 Apr 2023 21:47)  POCT Blood Glucose.: 276 mg/dL (12 Apr 2023 21:22)  POCT Blood Glucose.: 258 mg/dL (12 Apr 2023 21:19)  POCT Blood Glucose.: 106 mg/dL (12 Apr 2023 16:26)                          11.4   4.65  )-----------( 298      ( 13 Apr 2023 05:05 )             35.6     04-13    137  |  102  |  35<H>  ----------------------------<  94  4.2   |  25  |  1.03    Ca    9.0      13 Apr 2023 05:05  Phos  3.6     04-13  Mg     2.00     04-13                  RADIOLOGY & ADDITIONAL TESTS:    Telemetry Personally Reviewed - PVCs, NSR    Imaging Personally Reviewed -     Imaging Reviewed -     Consultant(s) Notes Reviewed -       Care Discussed with Consultants/Other Providers -

## 2023-04-13 NOTE — PROGRESS NOTE ADULT - NSPROGADDITIONALINFOA_GEN_ALL_CORE
Dispo: [facility] once medically optimized; likely later this week Dispo: [facility] once medically optimized; likely tomorrow morning once pt's BP improves

## 2023-04-14 ENCOUNTER — TRANSCRIPTION ENCOUNTER (OUTPATIENT)
Age: 83
End: 2023-04-14

## 2023-04-14 VITALS
RESPIRATION RATE: 18 BRPM | SYSTOLIC BLOOD PRESSURE: 104 MMHG | HEART RATE: 82 BPM | DIASTOLIC BLOOD PRESSURE: 57 MMHG | OXYGEN SATURATION: 100 % | TEMPERATURE: 98 F

## 2023-04-14 LAB
ANION GAP SERPL CALC-SCNC: 10 MMOL/L — SIGNIFICANT CHANGE UP (ref 7–14)
BUN SERPL-MCNC: 38 MG/DL — HIGH (ref 7–23)
CALCIUM SERPL-MCNC: 9.5 MG/DL — SIGNIFICANT CHANGE UP (ref 8.4–10.5)
CHLORIDE SERPL-SCNC: 100 MMOL/L — SIGNIFICANT CHANGE UP (ref 98–107)
CO2 SERPL-SCNC: 26 MMOL/L — SIGNIFICANT CHANGE UP (ref 22–31)
CREAT SERPL-MCNC: 1.25 MG/DL — SIGNIFICANT CHANGE UP (ref 0.5–1.3)
EGFR: 58 ML/MIN/1.73M2 — LOW
GLUCOSE BLDC GLUCOMTR-MCNC: 124 MG/DL — HIGH (ref 70–99)
GLUCOSE BLDC GLUCOMTR-MCNC: 176 MG/DL — HIGH (ref 70–99)
GLUCOSE SERPL-MCNC: 139 MG/DL — HIGH (ref 70–99)
HCT VFR BLD CALC: 37.9 % — LOW (ref 39–50)
HGB BLD-MCNC: 12.3 G/DL — LOW (ref 13–17)
MAGNESIUM SERPL-MCNC: 2 MG/DL — SIGNIFICANT CHANGE UP (ref 1.6–2.6)
MCHC RBC-ENTMCNC: 27 PG — SIGNIFICANT CHANGE UP (ref 27–34)
MCHC RBC-ENTMCNC: 32.5 GM/DL — SIGNIFICANT CHANGE UP (ref 32–36)
MCV RBC AUTO: 83.1 FL — SIGNIFICANT CHANGE UP (ref 80–100)
NRBC # BLD: 0 /100 WBCS — SIGNIFICANT CHANGE UP (ref 0–0)
NRBC # FLD: 0 K/UL — SIGNIFICANT CHANGE UP (ref 0–0)
PHOSPHATE SERPL-MCNC: 4 MG/DL — SIGNIFICANT CHANGE UP (ref 2.5–4.5)
PLATELET # BLD AUTO: 306 K/UL — SIGNIFICANT CHANGE UP (ref 150–400)
POTASSIUM SERPL-MCNC: 4.7 MMOL/L — SIGNIFICANT CHANGE UP (ref 3.5–5.3)
POTASSIUM SERPL-SCNC: 4.7 MMOL/L — SIGNIFICANT CHANGE UP (ref 3.5–5.3)
RBC # BLD: 4.56 M/UL — SIGNIFICANT CHANGE UP (ref 4.2–5.8)
RBC # FLD: 15.9 % — HIGH (ref 10.3–14.5)
SODIUM SERPL-SCNC: 136 MMOL/L — SIGNIFICANT CHANGE UP (ref 135–145)
WBC # BLD: 5.18 K/UL — SIGNIFICANT CHANGE UP (ref 3.8–10.5)
WBC # FLD AUTO: 5.18 K/UL — SIGNIFICANT CHANGE UP (ref 3.8–10.5)

## 2023-04-14 PROCEDURE — 99239 HOSP IP/OBS DSCHRG MGMT >30: CPT

## 2023-04-14 RX ORDER — SODIUM CHLORIDE 9 MG/ML
120 INJECTION, SOLUTION INTRAVENOUS
Refills: 0 | Status: DISCONTINUED | OUTPATIENT
Start: 2023-04-14 | End: 2023-04-14

## 2023-04-14 RX ORDER — INSULIN GLARGINE 100 [IU]/ML
3 INJECTION, SOLUTION SUBCUTANEOUS
Qty: 0 | Refills: 0 | DISCHARGE
Start: 2023-04-14

## 2023-04-14 RX ORDER — MELOXICAM 15 MG/1
1 TABLET ORAL
Qty: 0 | Refills: 0 | DISCHARGE

## 2023-04-14 RX ORDER — FUROSEMIDE 40 MG
1 TABLET ORAL
Qty: 0 | Refills: 0 | DISCHARGE
Start: 2023-04-14

## 2023-04-14 RX ORDER — METFORMIN HYDROCHLORIDE 850 MG/1
1 TABLET ORAL
Qty: 0 | Refills: 0 | DISCHARGE

## 2023-04-14 RX ORDER — FUROSEMIDE 40 MG
20 TABLET ORAL DAILY
Refills: 0 | Status: DISCONTINUED | OUTPATIENT
Start: 2023-04-15 | End: 2023-04-14

## 2023-04-14 RX ORDER — LISINOPRIL 2.5 MG/1
1 TABLET ORAL
Qty: 0 | Refills: 0 | DISCHARGE

## 2023-04-14 RX ADMIN — Medication 40 MILLIGRAM(S): at 06:06

## 2023-04-14 RX ADMIN — HEPARIN SODIUM 5000 UNIT(S): 5000 INJECTION INTRAVENOUS; SUBCUTANEOUS at 06:07

## 2023-04-14 RX ADMIN — Medication 1: at 11:32

## 2023-04-14 RX ADMIN — PANTOPRAZOLE SODIUM 40 MILLIGRAM(S): 20 TABLET, DELAYED RELEASE ORAL at 06:07

## 2023-04-14 RX ADMIN — SODIUM CHLORIDE 40 MILLILITER(S): 9 INJECTION, SOLUTION INTRAVENOUS at 10:51

## 2023-04-14 NOTE — CHART NOTE - NSCHARTNOTEFT_GEN_A_CORE
Discussed with cardiology. Goal systolic BP should be  with target MAP from 60-65. Can trial decreasing lasix from 40mg to 20mg and increase dose per tolerance. Discussed with MD Ocampo, Pt not symptomatic, cleared for d/c today to [facility].    Belen Carter NP  [id number]

## 2023-04-14 NOTE — PROGRESS NOTE ADULT - PROBLEM SELECTOR PROBLEM 4
Prophylactic measure
Prophylactic measure
Type 2 diabetes mellitus treated without insulin
Prophylactic measure
Type 2 diabetes mellitus treated without insulin
Prophylactic measure

## 2023-04-14 NOTE — PROGRESS NOTE ADULT - PROVIDER SPECIALTY LIST ADULT
Cardiology
Cardiology
Hospitalist

## 2023-04-14 NOTE — DISCHARGE NOTE NURSING/CASE MANAGEMENT/SOCIAL WORK - NSPROMEDSDISPOSITION_GEN_A_NUR
Patient's father is returning call from message below. Father is looking to get an appointment for patient. Father states that he was informed by MD, that if the symptoms continued he was to look into getting an appointment with MD. Father stats the symptoms have persisted x6 days. Additionally, father states the patient is complaining of leg pain. Please return call. Writer unable to further assist.    bedside

## 2023-04-14 NOTE — PROGRESS NOTE ADULT - PROBLEM/PLAN-3
3
DISPLAY PLAN FREE TEXT

## 2023-04-14 NOTE — PROGRESS NOTE ADULT - PROBLEM SELECTOR PROBLEM 2
Severe pulmonary hypertension

## 2023-04-14 NOTE — PROGRESS NOTE ADULT - PROBLEM SELECTOR PROBLEM 3
Type 2 diabetes mellitus treated without insulin
Respiratory acidosis with metabolic acidosis
Type 2 diabetes mellitus treated without insulin
Respiratory acidosis with metabolic acidosis
Type 2 diabetes mellitus treated without insulin

## 2023-04-14 NOTE — PROGRESS NOTE ADULT - PROBLEM SELECTOR PLAN 1
patient with cardiac amyloidosis and  follows with Dr Laurent.   not been taking some medications due to urinary incontinence when he takes them  self discontinued Tafamidis on 01/01/2023, but restarted in ~ mid March  TTE of 05/2022 significant for severe pulmonary hypertension, but per pulm notes, RHC w/o evidence of pHTN  TTE reviewed - concentric LVH, EF 53%  Noted to have low BP today but remained asymptomatic    - gave gentle IVF- 250cc @50/hr for 5 hrs as discussed with cardiology  - Previously started on IV furosemide 40mg BID-> will transition to PO Lasix 40mg daily   - Orthostatics ordered and follow up -> unremarkable and pt asymptomatic   - house cardiology consulted, recs reviewed  - started lisinopril at reduced dose (10mg) daily-> will hold for now due to low BP   - monitor intake/output, weight daily, fluid restriction of 1 liters daily  - continue supplemental oxygen  - had TOV on 4/11 and passed. Now voiding well

## 2023-04-14 NOTE — DISCHARGE NOTE NURSING/CASE MANAGEMENT/SOCIAL WORK - PATIENT PORTAL LINK FT
You can access the FollowMyHealth Patient Portal offered by [facility] by registering at the following website: [email or web address]. By joining [facility]’s FollowMyHealth portal, you will also be able to view your health information using other applications (apps) compatible with our system.

## 2023-04-14 NOTE — PROGRESS NOTE ADULT - SUBJECTIVE AND OBJECTIVE BOX
Patient is a 82y old  Male who presents with a chief complaint of Shortness of breath (13 Apr 2023 14:18)      SUBJECTIVE / OVERNIGHT EVENTS:    No events overnight. This AM, patient without n/v/d/cp/sob.      MEDICATIONS  (STANDING):  dextrose 5%. 1000 milliLiter(s) (100 mL/Hr) IV Continuous <Continuous>  dextrose 5%. 1000 milliLiter(s) (50 mL/Hr) IV Continuous <Continuous>  dextrose 50% Injectable 25 Gram(s) IV Push once  dextrose 50% Injectable 12.5 Gram(s) IV Push once  dextrose 50% Injectable 25 Gram(s) IV Push once  gabapentin 100 milliGRAM(s) Oral at bedtime  glucagon  Injectable 1 milliGRAM(s) IntraMuscular once  heparin   Injectable 5000 Unit(s) SubCutaneous every 12 hours  insulin glargine Injectable (LANTUS) 3 Unit(s) SubCutaneous at bedtime  insulin lispro (ADMELOG) corrective regimen sliding scale   SubCutaneous three times a day before meals  insulin lispro (ADMELOG) corrective regimen sliding scale   SubCutaneous at bedtime  pantoprazole    Tablet 40 milliGRAM(s) Oral before breakfast  simvastatin 20 milliGRAM(s) Oral at bedtime  sodium chloride 0.45%. 120 milliLiter(s) (40 mL/Hr) IV Continuous <Continuous>    MEDICATIONS  (PRN):  acetaminophen     Tablet .. 650 milliGRAM(s) Oral every 6 hours PRN Mild Pain (1 - 3)  dextrose Oral Gel 15 Gram(s) Oral once PRN Blood Glucose LESS THAN 70 milliGRAM(s)/deciliter      PHYSICAL EXAM:  T(C): 36.8 (04-14-23 @ 09:25), Max: 36.8 (04-14-23 @ 09:25)  HR: 84 (04-14-23 @ 09:25) (79 - 94)  BP: 101/52 (04-14-23 @ 09:25) (100/54 - 120/74)  RR: 18 (04-14-23 @ 09:25) (16 - 20)  SpO2: 100% (04-14-23 @ 09:25) (99% - 100%)  I&O's Summary    13 Apr 2023 07:01  -  14 Apr 2023 07:00  --------------------------------------------------------  IN: 770 mL / OUT: 1600 mL / NET: -830 mL    14 Apr 2023 07:01  -  14 Apr 2023 13:49  --------------------------------------------------------  IN: 150 mL / OUT: 500 mL / NET: -350 mL      GENERAL: NAD, well-developed  HEAD:  Atraumatic, Normocephalic, MMM  CHEST/LUNG: No use of accessory muscles, CTAB, breathing non-labored  COR: RR, no mrcg  ABD: Soft, ND/NT, +BS  PSYCH: AAOx3  NEUROLOGY: CN II-XII grossly intact, moving all extremities  SKIN: No rashes or lesions  EXT: wwp, no cce    LABS:  CAPILLARY BLOOD GLUCOSE      POCT Blood Glucose.: 176 mg/dL (14 Apr 2023 11:09)  POCT Blood Glucose.: 124 mg/dL (14 Apr 2023 07:01)  POCT Blood Glucose.: 206 mg/dL (13 Apr 2023 21:38)  POCT Blood Glucose.: 124 mg/dL (13 Apr 2023 16:35)                          12.3   5.18  )-----------( 306      ( 14 Apr 2023 05:09 )             37.9     04-14    136  |  100  |  38<H>  ----------------------------<  139<H>  4.7   |  26  |  1.25    Ca    9.5      14 Apr 2023 05:09  Phos  4.0     04-14  Mg     2.00     04-14                  RADIOLOGY & ADDITIONAL TESTS:    Telemetry Personally Reviewed -     Imaging Personally Reviewed -     Imaging Reviewed -     Consultant(s) Notes Reviewed -       Care Discussed with Consultants/Other Providers -

## 2023-04-21 ENCOUNTER — APPOINTMENT (OUTPATIENT)
Dept: VASCULAR SURGERY | Facility: CLINIC | Age: 83
End: 2023-04-21

## 2023-05-04 ENCOUNTER — APPOINTMENT (OUTPATIENT)
Dept: CARDIOLOGY | Facility: CLINIC | Age: 83
End: 2023-05-04
Payer: MEDICARE

## 2023-05-04 VITALS
SYSTOLIC BLOOD PRESSURE: 143 MMHG | OXYGEN SATURATION: 98 % | BODY MASS INDEX: 23.91 KG/M2 | DIASTOLIC BLOOD PRESSURE: 79 MMHG | HEART RATE: 90 BPM | WEIGHT: 135 LBS

## 2023-05-04 PROCEDURE — 36415 COLL VENOUS BLD VENIPUNCTURE: CPT

## 2023-05-04 PROCEDURE — 99215 OFFICE O/P EST HI 40 MIN: CPT | Mod: 25

## 2023-05-04 RX ORDER — OMEPRAZOLE 20 MG/1
20 CAPSULE, DELAYED RELEASE ORAL DAILY
Refills: 0 | Status: ACTIVE | COMMUNITY

## 2023-05-04 RX ORDER — MELOXICAM 15 MG/1
15 TABLET ORAL DAILY
Refills: 0 | Status: DISCONTINUED | COMMUNITY
End: 2023-05-04

## 2023-05-04 RX ORDER — LISINOPRIL 20 MG/1
20 TABLET ORAL DAILY
Qty: 90 | Refills: 1 | Status: DISCONTINUED | COMMUNITY
End: 2023-05-04

## 2023-05-05 PROBLEM — R60.0 LOCALIZED EDEMA: Chronic | Status: ACTIVE | Noted: 2023-04-04

## 2023-05-05 PROBLEM — E11.9 TYPE 2 DIABETES MELLITUS WITHOUT COMPLICATIONS: Chronic | Status: ACTIVE | Noted: 2023-04-04

## 2023-05-05 LAB
25(OH)D3 SERPL-MCNC: 56.4 NG/ML
ALBUMIN SERPL ELPH-MCNC: 4 G/DL
ALP BLD-CCNC: 103 U/L
ALT SERPL-CCNC: 15 U/L
ANION GAP SERPL CALC-SCNC: 13 MMOL/L
AST SERPL-CCNC: 22 U/L
BASOPHILS # BLD AUTO: 0.06 K/UL
BASOPHILS NFR BLD AUTO: 1.4 %
BILIRUB SERPL-MCNC: 0.5 MG/DL
BUN SERPL-MCNC: 30 MG/DL
CALCIUM SERPL-MCNC: 9.8 MG/DL
CHLORIDE SERPL-SCNC: 103 MMOL/L
CHOLEST SERPL-MCNC: 122 MG/DL
CO2 SERPL-SCNC: 24 MMOL/L
CREAT SERPL-MCNC: 1.21 MG/DL
EGFR: 60 ML/MIN/1.73M2
EOSINOPHIL # BLD AUTO: 0.38 K/UL
EOSINOPHIL NFR BLD AUTO: 8.8 %
ESTIMATED AVERAGE GLUCOSE: 151 MG/DL
GLUCOSE SERPL-MCNC: 120 MG/DL
HBA1C MFR BLD HPLC: 6.9 %
HCT VFR BLD CALC: 39.5 %
HDLC SERPL-MCNC: 63 MG/DL
HGB BLD-MCNC: 12.5 G/DL
IMM GRANULOCYTES NFR BLD AUTO: 0 %
LDLC SERPL CALC-MCNC: 49 MG/DL
LYMPHOCYTES # BLD AUTO: 1.54 K/UL
LYMPHOCYTES NFR BLD AUTO: 35.7 %
MAN DIFF?: NORMAL
MCHC RBC-ENTMCNC: 27 PG
MCHC RBC-ENTMCNC: 31.6 GM/DL
MCV RBC AUTO: 85.3 FL
MONOCYTES # BLD AUTO: 0.46 K/UL
MONOCYTES NFR BLD AUTO: 10.7 %
NEUTROPHILS # BLD AUTO: 1.87 K/UL
NEUTROPHILS NFR BLD AUTO: 43.4 %
NONHDLC SERPL-MCNC: 59 MG/DL
NT-PROBNP SERPL-MCNC: 2067 PG/ML
PLATELET # BLD AUTO: 205 K/UL
POTASSIUM SERPL-SCNC: 4.8 MMOL/L
PREALB SERPL NEPH-MCNC: 26 MG/DL
PROT SERPL-MCNC: 7 G/DL
RBC # BLD: 4.63 M/UL
RBC # FLD: 16.6 %
SODIUM SERPL-SCNC: 140 MMOL/L
TRIGL SERPL-MCNC: 50 MG/DL
TSH SERPL-ACNC: 2.2 UIU/ML
WBC # FLD AUTO: 4.31 K/UL

## 2023-05-05 NOTE — CARDIOLOGY SUMMARY
[de-identified] : 5/17/2022, NSR with 1st degree AVB, LAE. Low voltage in the limb leads. [de-identified] : 5/6/2022, septum 1.3 cm, PWT 1.2 cm, dilated LA, mild concentric LVH, severe pulmonary hypertension, LVEF 57% [de-identified] : 6/21/2022, technetium pyrophosphate scan, grade 3, ratio 1.9 [de-identified] : 5/9/2022 with Mahamed Nguyen MD at [facility] - patent coronary arteries, mild pulmonary hypertension

## 2023-05-05 NOTE — DISCUSSION/SUMMARY
[FreeTextEntry1] : Patient is an 82 year-old black gentleman with history as above who is referred now for evaluation and management of cardiac amyloidosis. 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

## 2023-05-05 NOTE — REASON FOR VISIT
[Other: ____] : [unfilled] [Other: _____] : [unfilled] [FreeTextEntry3] : Neal Seaman MD [FreeTextEntry1] : May 2023 - Patient returns today for follow-up in his usual state of health. He remains on Vyndamax, but he has not yet started silencer therapy.

## 2023-05-05 NOTE — HISTORY OF PRESENT ILLNESS
[FreeTextEntry1] : Patient is an 82 year-old Black gentleman with known past medical history of noninsulin dependent type II diabetes, prostate ca, who has been experiencing bilateral lower extremity edema for more than a year, recently admitted to [facility], followed-up as outpatient with Leo Quesada MD, and was noted to have evidence of cardiac amyloidosis. 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 \par \par Patient's 41 year-old son was recently diagnosed with hereditary TTR amyloidosis. \par \par Patient has not been sick with Covid-19. He has received two shots and a booster, all from [employer] [facility] [place].\par \par August 2022 - Patient returns today for follow-up. He started Vyndamax 61 mg daily approximately two weeks ago. He does not notice any change in his condition. He has run out of his loop diuretics, and so he has not been on any diuretic recently. \par \par 9/16/2022.\par Pedro Juardo returns today for scheduled follow up. He is accompanied by his daughter, Radha.\par Today he is feeling well. He has been taking Vyndamax and furosemide without any difficulty.\par He has been using a cane when he walks for gait stabilization, but otherwise remains independent.\par His only complaint today is for neuropathy in his fingers and some lower back pain.\par \par January 2023 - Patient returns today for follow-up in his usual state of health.\par Patient stopped his Vyndamax on January 1 because he thought it was causing urinary frequency. 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

## 2023-05-20 ENCOUNTER — EMERGENCY (EMERGENCY)
Facility: HOSPITAL | Age: 83
LOS: 1 days | Discharge: HOME | End: 2023-05-20
Attending: STUDENT IN AN ORGANIZED HEALTH CARE EDUCATION/TRAINING PROGRAM | Admitting: STUDENT IN AN ORGANIZED HEALTH CARE EDUCATION/TRAINING PROGRAM
Payer: MEDICARE

## 2023-05-20 VITALS
HEART RATE: 86 BPM | SYSTOLIC BLOOD PRESSURE: 134 MMHG | OXYGEN SATURATION: 100 % | DIASTOLIC BLOOD PRESSURE: 67 MMHG | TEMPERATURE: 98 F | RESPIRATION RATE: 20 BRPM | HEIGHT: 62 IN

## 2023-05-20 VITALS
DIASTOLIC BLOOD PRESSURE: 62 MMHG | SYSTOLIC BLOOD PRESSURE: 112 MMHG | TEMPERATURE: 98 F | RESPIRATION RATE: 16 BRPM | HEART RATE: 70 BPM | OXYGEN SATURATION: 100 %

## 2023-05-20 DIAGNOSIS — Z92.3 PERSONAL HISTORY OF IRRADIATION: Chronic | ICD-10-CM

## 2023-05-20 LAB
ALBUMIN SERPL ELPH-MCNC: 4.2 G/DL — SIGNIFICANT CHANGE UP (ref 3.3–5)
ALP SERPL-CCNC: 97 U/L — SIGNIFICANT CHANGE UP (ref 40–120)
ALT FLD-CCNC: 18 U/L — SIGNIFICANT CHANGE UP (ref 4–41)
ANION GAP SERPL CALC-SCNC: 17 MMOL/L — HIGH (ref 7–14)
APPEARANCE UR: CLEAR — SIGNIFICANT CHANGE UP
APTT BLD: 27.4 SEC — SIGNIFICANT CHANGE UP (ref 27–36.3)
AST SERPL-CCNC: 32 U/L — SIGNIFICANT CHANGE UP (ref 4–40)
BACTERIA # UR AUTO: ABNORMAL
BASE EXCESS BLDV CALC-SCNC: 0.9 MMOL/L — SIGNIFICANT CHANGE UP (ref -2–3)
BASE EXCESS BLDV CALC-SCNC: 2 MMOL/L — SIGNIFICANT CHANGE UP (ref -2–3)
BASE EXCESS BLDV CALC-SCNC: 4.7 MMOL/L — HIGH (ref -2–3)
BASOPHILS # BLD AUTO: 0.02 K/UL — SIGNIFICANT CHANGE UP (ref 0–0.2)
BASOPHILS NFR BLD AUTO: 0.3 % — SIGNIFICANT CHANGE UP (ref 0–2)
BILIRUB SERPL-MCNC: 0.6 MG/DL — SIGNIFICANT CHANGE UP (ref 0.2–1.2)
BILIRUB UR-MCNC: NEGATIVE — SIGNIFICANT CHANGE UP
BLOOD GAS VENOUS COMPREHENSIVE RESULT: SIGNIFICANT CHANGE UP
BUN SERPL-MCNC: 35 MG/DL — HIGH (ref 7–23)
CALCIUM SERPL-MCNC: 9.8 MG/DL — SIGNIFICANT CHANGE UP (ref 8.4–10.5)
CHLORIDE BLDV-SCNC: 103 MMOL/L — SIGNIFICANT CHANGE UP (ref 96–108)
CHLORIDE BLDV-SCNC: 105 MMOL/L — SIGNIFICANT CHANGE UP (ref 96–108)
CHLORIDE BLDV-SCNC: 108 MMOL/L — SIGNIFICANT CHANGE UP (ref 96–108)
CHLORIDE SERPL-SCNC: 100 MMOL/L — SIGNIFICANT CHANGE UP (ref 98–107)
CO2 BLDV-SCNC: 27.7 MMOL/L — HIGH (ref 22–26)
CO2 BLDV-SCNC: 28 MMOL/L — HIGH (ref 22–26)
CO2 BLDV-SCNC: 29.3 MMOL/L — HIGH (ref 22–26)
CO2 SERPL-SCNC: 23 MMOL/L — SIGNIFICANT CHANGE UP (ref 22–31)
COLOR SPEC: YELLOW — SIGNIFICANT CHANGE UP
CREAT SERPL-MCNC: 1.17 MG/DL — SIGNIFICANT CHANGE UP (ref 0.5–1.3)
DIFF PNL FLD: NEGATIVE — SIGNIFICANT CHANGE UP
EGFR: 63 ML/MIN/1.73M2 — SIGNIFICANT CHANGE UP
EOSINOPHIL # BLD AUTO: 0.01 K/UL — SIGNIFICANT CHANGE UP (ref 0–0.5)
EOSINOPHIL NFR BLD AUTO: 0.1 % — SIGNIFICANT CHANGE UP (ref 0–6)
EPI CELLS # UR: 1 /HPF — SIGNIFICANT CHANGE UP (ref 0–5)
GAS PNL BLDV: 137 MMOL/L — SIGNIFICANT CHANGE UP (ref 136–145)
GAS PNL BLDV: 138 MMOL/L — SIGNIFICANT CHANGE UP (ref 136–145)
GAS PNL BLDV: 140 MMOL/L — SIGNIFICANT CHANGE UP (ref 136–145)
GAS PNL BLDV: SIGNIFICANT CHANGE UP
GAS PNL BLDV: SIGNIFICANT CHANGE UP
GLUCOSE BLDV-MCNC: 112 MG/DL — HIGH (ref 70–99)
GLUCOSE BLDV-MCNC: 171 MG/DL — HIGH (ref 70–99)
GLUCOSE BLDV-MCNC: 199 MG/DL — HIGH (ref 70–99)
GLUCOSE SERPL-MCNC: 191 MG/DL — HIGH (ref 70–99)
GLUCOSE UR QL: NEGATIVE — SIGNIFICANT CHANGE UP
HCO3 BLDV-SCNC: 26 MMOL/L — SIGNIFICANT CHANGE UP (ref 22–29)
HCO3 BLDV-SCNC: 27 MMOL/L — SIGNIFICANT CHANGE UP (ref 22–29)
HCO3 BLDV-SCNC: 28 MMOL/L — SIGNIFICANT CHANGE UP (ref 22–29)
HCT VFR BLD CALC: 40 % — SIGNIFICANT CHANGE UP (ref 39–50)
HCT VFR BLDA CALC: 36 % — LOW (ref 39–51)
HCT VFR BLDA CALC: 36 % — LOW (ref 39–51)
HCT VFR BLDA CALC: 40 % — SIGNIFICANT CHANGE UP (ref 39–51)
HGB BLD CALC-MCNC: 12.1 G/DL — LOW (ref 12.6–17.4)
HGB BLD CALC-MCNC: 12.1 G/DL — LOW (ref 12.6–17.4)
HGB BLD CALC-MCNC: 13.3 G/DL — SIGNIFICANT CHANGE UP (ref 12.6–17.4)
HGB BLD-MCNC: 13 G/DL — SIGNIFICANT CHANGE UP (ref 13–17)
HYALINE CASTS # UR AUTO: 1 /LPF — SIGNIFICANT CHANGE UP (ref 0–7)
IANC: 5.63 K/UL — SIGNIFICANT CHANGE UP (ref 1.8–7.4)
IMM GRANULOCYTES NFR BLD AUTO: 0.4 % — SIGNIFICANT CHANGE UP (ref 0–0.9)
INR BLD: 1.13 RATIO — SIGNIFICANT CHANGE UP (ref 0.88–1.16)
KETONES UR-MCNC: NEGATIVE — SIGNIFICANT CHANGE UP
LACTATE BLDV-MCNC: 1.6 MMOL/L — SIGNIFICANT CHANGE UP (ref 0.5–2)
LACTATE BLDV-MCNC: 3.3 MMOL/L — HIGH (ref 0.5–2)
LACTATE BLDV-MCNC: 3.7 MMOL/L — HIGH (ref 0.5–2)
LEUKOCYTE ESTERASE UR-ACNC: NEGATIVE — SIGNIFICANT CHANGE UP
LIDOCAIN IGE QN: 12 U/L — SIGNIFICANT CHANGE UP (ref 7–60)
LYMPHOCYTES # BLD AUTO: 0.99 K/UL — LOW (ref 1–3.3)
LYMPHOCYTES # BLD AUTO: 14.3 % — SIGNIFICANT CHANGE UP (ref 13–44)
MCHC RBC-ENTMCNC: 26.1 PG — LOW (ref 27–34)
MCHC RBC-ENTMCNC: 32.5 GM/DL — SIGNIFICANT CHANGE UP (ref 32–36)
MCV RBC AUTO: 80.2 FL — SIGNIFICANT CHANGE UP (ref 80–100)
MONOCYTES # BLD AUTO: 0.23 K/UL — SIGNIFICANT CHANGE UP (ref 0–0.9)
MONOCYTES NFR BLD AUTO: 3.3 % — SIGNIFICANT CHANGE UP (ref 2–14)
NEUTROPHILS # BLD AUTO: 5.63 K/UL — SIGNIFICANT CHANGE UP (ref 1.8–7.4)
NEUTROPHILS NFR BLD AUTO: 81.6 % — HIGH (ref 43–77)
NITRITE UR-MCNC: NEGATIVE — SIGNIFICANT CHANGE UP
NRBC # BLD: 0 /100 WBCS — SIGNIFICANT CHANGE UP (ref 0–0)
NRBC # FLD: 0 K/UL — SIGNIFICANT CHANGE UP (ref 0–0)
NT-PROBNP SERPL-SCNC: 1309 PG/ML — HIGH
PCO2 BLDV: 37 MMHG — LOW (ref 42–55)
PCO2 BLDV: 40 MMHG — LOW (ref 42–55)
PCO2 BLDV: 46 MMHG — SIGNIFICANT CHANGE UP (ref 42–55)
PH BLDV: 7.37 — SIGNIFICANT CHANGE UP (ref 7.32–7.43)
PH BLDV: 7.43 — SIGNIFICANT CHANGE UP (ref 7.32–7.43)
PH BLDV: 7.49 — HIGH (ref 7.32–7.43)
PH UR: 7 — SIGNIFICANT CHANGE UP (ref 5–8)
PLATELET # BLD AUTO: 240 K/UL — SIGNIFICANT CHANGE UP (ref 150–400)
PO2 BLDV: 24 MMHG — LOW (ref 25–45)
PO2 BLDV: 34 MMHG — SIGNIFICANT CHANGE UP (ref 25–45)
PO2 BLDV: 66 MMHG — HIGH (ref 25–45)
POTASSIUM BLDV-SCNC: 3.8 MMOL/L — SIGNIFICANT CHANGE UP (ref 3.5–5.1)
POTASSIUM BLDV-SCNC: 4.2 MMOL/L — SIGNIFICANT CHANGE UP (ref 3.5–5.1)
POTASSIUM BLDV-SCNC: 4.7 MMOL/L — SIGNIFICANT CHANGE UP (ref 3.5–5.1)
POTASSIUM SERPL-MCNC: 4.8 MMOL/L — SIGNIFICANT CHANGE UP (ref 3.5–5.3)
POTASSIUM SERPL-SCNC: 4.8 MMOL/L — SIGNIFICANT CHANGE UP (ref 3.5–5.3)
PROT SERPL-MCNC: 7.5 G/DL — SIGNIFICANT CHANGE UP (ref 6–8.3)
PROT UR-MCNC: ABNORMAL
PROTHROM AB SERPL-ACNC: 13.1 SEC — SIGNIFICANT CHANGE UP (ref 10.5–13.4)
RBC # BLD: 4.99 M/UL — SIGNIFICANT CHANGE UP (ref 4.2–5.8)
RBC # FLD: 17 % — HIGH (ref 10.3–14.5)
RBC CASTS # UR COMP ASSIST: 1 /HPF — SIGNIFICANT CHANGE UP (ref 0–4)
SAO2 % BLDV: 29.2 % — LOW (ref 67–88)
SAO2 % BLDV: 53.8 % — LOW (ref 67–88)
SAO2 % BLDV: 93.1 % — HIGH (ref 67–88)
SODIUM SERPL-SCNC: 140 MMOL/L — SIGNIFICANT CHANGE UP (ref 135–145)
SP GR SPEC: 1.03 — SIGNIFICANT CHANGE UP (ref 1.01–1.05)
TROPONIN T, HIGH SENSITIVITY RESULT: 76 NG/L — CRITICAL HIGH
TROPONIN T, HIGH SENSITIVITY RESULT: 79 NG/L — CRITICAL HIGH
UROBILINOGEN FLD QL: SIGNIFICANT CHANGE UP
WBC # BLD: 6.91 K/UL — SIGNIFICANT CHANGE UP (ref 3.8–10.5)
WBC # FLD AUTO: 6.91 K/UL — SIGNIFICANT CHANGE UP (ref 3.8–10.5)
WBC UR QL: 1 /HPF — SIGNIFICANT CHANGE UP (ref 0–5)

## 2023-05-20 PROCEDURE — 93010 ELECTROCARDIOGRAM REPORT: CPT

## 2023-05-20 PROCEDURE — 74174 CTA ABD&PLVS W/CONTRAST: CPT | Mod: 26,MA

## 2023-05-20 PROCEDURE — 99285 EMERGENCY DEPT VISIT HI MDM: CPT

## 2023-05-20 RX ORDER — ONDANSETRON 8 MG/1
4 TABLET, FILM COATED ORAL ONCE
Refills: 0 | Status: COMPLETED | OUTPATIENT
Start: 2023-05-20 | End: 2023-05-20

## 2023-05-20 RX ORDER — HYDROMORPHONE HYDROCHLORIDE 2 MG/ML
0.5 INJECTION INTRAMUSCULAR; INTRAVENOUS; SUBCUTANEOUS ONCE
Refills: 0 | Status: DISCONTINUED | OUTPATIENT
Start: 2023-05-20 | End: 2023-05-20

## 2023-05-20 RX ORDER — FAMOTIDINE 10 MG/ML
20 INJECTION INTRAVENOUS ONCE
Refills: 0 | Status: COMPLETED | OUTPATIENT
Start: 2023-05-20 | End: 2023-05-20

## 2023-05-20 RX ORDER — ACETAMINOPHEN 500 MG
1000 TABLET ORAL ONCE
Refills: 0 | Status: COMPLETED | OUTPATIENT
Start: 2023-05-20 | End: 2023-05-20

## 2023-05-20 RX ORDER — SODIUM CHLORIDE 9 MG/ML
1000 INJECTION INTRAMUSCULAR; INTRAVENOUS; SUBCUTANEOUS ONCE
Refills: 0 | Status: COMPLETED | OUTPATIENT
Start: 2023-05-20 | End: 2023-05-20

## 2023-05-20 RX ADMIN — HYDROMORPHONE HYDROCHLORIDE 0.5 MILLIGRAM(S): 2 INJECTION INTRAMUSCULAR; INTRAVENOUS; SUBCUTANEOUS at 13:04

## 2023-05-20 RX ADMIN — Medication 400 MILLIGRAM(S): at 12:30

## 2023-05-20 RX ADMIN — Medication 1000 MILLIGRAM(S): at 13:04

## 2023-05-20 RX ADMIN — FAMOTIDINE 20 MILLIGRAM(S): 10 INJECTION INTRAVENOUS at 12:31

## 2023-05-20 RX ADMIN — ONDANSETRON 4 MILLIGRAM(S): 8 TABLET, FILM COATED ORAL at 14:15

## 2023-05-20 RX ADMIN — HYDROMORPHONE HYDROCHLORIDE 0.5 MILLIGRAM(S): 2 INJECTION INTRAMUSCULAR; INTRAVENOUS; SUBCUTANEOUS at 12:30

## 2023-05-20 RX ADMIN — SODIUM CHLORIDE 1000 MILLILITER(S): 9 INJECTION INTRAMUSCULAR; INTRAVENOUS; SUBCUTANEOUS at 12:30

## 2023-05-20 RX ADMIN — ONDANSETRON 4 MILLIGRAM(S): 8 TABLET, FILM COATED ORAL at 12:30

## 2023-05-20 NOTE — ED PROVIDER NOTE - PHYSICAL EXAMINATION
CONSTITUTIONAL: Well-developed; well-nourished; in acute distress.   SKIN: warm, dry  HEAD: Normocephalic; atraumatic.  EYES: no conjunctival injection. PERRL.   ENT: No nasal discharge; airway clear.  NECK: Supple; non tender.  CARD: S1, S2 normal; Regular rate and rhythm.   RESP: No wheezes, rales or rhonchi. Good air movement bilaterally.   ABD: suprapubic ttp however pain OOP   EXT: Ambulates independently.  No cyanosis or edema.   NEURO: AOx3  PSYCH: Cooperative, appropriate.

## 2023-05-20 NOTE — ED PROVIDER NOTE - OBJECTIVE STATEMENT
Anamaria DO PGY-3:  82-year-old male past medical history diabetes, hypertension, history of prostate cancer 10 years ago, pulmonary hypertension status postcholecystectomy presents with sudden onset severe abdominal pain.  Began in the lower midline region.  Associated with nausea.  Contrary to triage note there has been no diarrhea.  Patient states he had a normal bowel movement earlier today.  Patient denies having any recent flatus.  Only took Pepto-Bismol prior to ED arrival.

## 2023-05-20 NOTE — ED ADULT NURSE NOTE - OBJECTIVE STATEMENT
Pt received in room 28. Pt alert and oriented x 4, ambulatory at baseline. Pt c/o suprapubic pain rated 10/10 that radiates to the epigastric. Pt reports pain began this morning while having a bowel movement. Skin intact, warm and dry to touch. NSR on the monitor. Pulses present +2 x 4 extremities. Respirations even and unlabored, lung sounds, clear bilaterally. Abdomen soft, round, nondistended, nontender, normoactive bowel sounds x 4 quadrants. No edema x 4 extremities. Pt denies chest pain, headache, N/V/D, blurry vision,  symptoms, dark stools. 20G IV in the right wrist and left AC. VSS in flow sheet. Labs drawn and pt medicated per MD orders. Awaiting CT scan.

## 2023-05-20 NOTE — ED PROVIDER NOTE - ATTENDING CONTRIBUTION TO CARE
83 y/o M with PMH HTN, DM,Pulm HTN, ? CHF, prostate ca p/w abdominal pain since am. Pt reports sudden onset abdominal pain w/ nausea. pt states pain began this am which is located in the lower abdomen w/o radiation. He reports he had a last bm earlier today which was normal. He denies SOB, fever, chill.s He states he felt the pain while he was in the shower. Pain was 10/10 constant located in the lower abdomen w/o radiation. he reports some nasuea w/ the pain, denies bleeding sob, chest pain. Pt denies similar prior chest pain. pt tender in suprapubic and periumbilical area, hx of cholecystectomy.   denies fever, chills, chest pain, SOB, +abdominal pain, diarrhea, dysuria, syncope, bleeding, new rash,weakness, numbness, blurred vision    ROS  otherwise negative as per HPI  Gen: Awake, Alert, WD, WN, NAD  Head:  NC/AT  Eyes:  PERRL, EOMI, Conjunctiva pink, lids normal, no scleral icterus  ENT: OP clear, no exudates, , moist mucus membranes  Neck: supple, nontender, no meningismus, no JVD, trachea midline  Cardiac/CV:  S1 S2, RRR, no M/G/R  Respiratory/Pulm:  CTAB, good air movement, normal resp effort, no wheezes/stridor/retractions/rales/rhonchi  Gastrointestinal/Abdomen:  Soft, tender suprapubic epigastric , nondistended, decreased BS, no rebound/guarding  Back:  no CVAT, no MLT  Ext:  warm, well perfused, moving all extremities spontaneously, no peripheral edema, distal pulses intact  Skin: intact, no rash  Neuro:  AAOx3, sensation intact, motor 5/5 x 4 extremities,  speech clear  mdm as above

## 2023-05-20 NOTE — ED ADULT NURSE NOTE - NSFALLUNIVINTERV_ED_ALL_ED
Bed/Stretcher in lowest position, wheels locked, appropriate side rails in place/Call bell, personal items and telephone in reach/Instruct patient to call for assistance before getting out of bed/chair/stretcher/Non-slip footwear applied when patient is off stretcher/[place] to call system/Physically safe environment - no spills, clutter or unnecessary equipment/Purposeful proactive rounding/Room/bathroom lighting operational, light cord in reach

## 2023-05-20 NOTE — ED ADULT NURSE NOTE - CHIEF COMPLAINT QUOTE
pt with hx of DM, HTN, Prostate CA x 10 years ago, arrives C/O generalized abdominal pain since this morning. Last bowel movement this morning normal. finger stick 176. pt denies N/V/D, denies chest pain

## 2023-05-20 NOTE — ED PROVIDER NOTE - CLINICAL SUMMARY MEDICAL DECISION MAKING FREE TEXT BOX
83 y/o M with PMH HTN, DM,Pulm HTN, ? CHF, prostate ca p/w abdominal pain since am. Pt reports sudden onset abdominal pain w/ nausea. pt states pain began this am which is located in the lower abdomen w/o radiation. He reports he had a last bm earlier today which was normal. He denies SOB, fever, chill.s He states severe pain in lower abdomen. pt w/ severe pain in lower abdomen sudden onset w/o radiation. concern for possible ischemic colitis vs bowel obstruction vs volvulus vs kidney stone. pt w/ prior surgical hx of cholecystectomy. will proceed w/   pain control, ivf, cbc, cmp, lipase, ua, ct angio abdomen pelvis, reassessment

## 2023-05-20 NOTE — ED ADULT NURSE REASSESSMENT NOTE - NS ED NURSE REASSESS COMMENT FT1
Break Coverage RN: Pt resting in stretcher at this time, offers no complaints. Normal sinus on cardiac monitor. Pending CT scan. Safety maintained, bed in lowest position, side rails raised, call bell in reach.

## 2023-05-20 NOTE — ED ADULT TRIAGE NOTE - CHIEF COMPLAINT QUOTE
pt with hx of DM, HTN, Prostate CA x 10 years ago, arrives C/O generalized abdominal pain since this morning. Last bowel movement this morning normal. bgl 176. pt denies N/V/D, denies chest pain pt with hx of DM, HTN, Prostate CA x 10 years ago, arrives C/O generalized abdominal pain since this morning. Last bowel movement this morning normal. finger stick 176. pt denies N/V/D, denies chest pain

## 2023-05-20 NOTE — ED PROVIDER NOTE - PROGRESS NOTE DETAILS
O'Richard DO PGY-3: called radiology to request expedited read Eligio Henderson MD (PGY2): CT abdomen pelvis without signs of mesenteric ischemia.  Incidental finding of bony lesions, informed patient to follow-up with urology.  Concern for  herniation of bladder tone to right inguinal area on reassessment patient does not have any abdominal pain.  No obvious masses palpable.  No skin changes around the right inguinal region.  Patient feels much better.  Lactate initially 3.1, repeat less than 2.  Using shared decision making for patient and daughter would like to leave at this time and follow-up with the urologist as well as a surgeon.   Strict return precautions discussed.

## 2023-05-20 NOTE — ED PROVIDER NOTE - DIFFERENTIAL DIAGNOSIS
Diverticulitis vs kidney stone vs perforated viscus vs ischemic colitis vs voluvulus vs sbo Differential Diagnosis

## 2023-05-20 NOTE — ED PROVIDER NOTE - NSFOLLOWUPINSTRUCTIONS_ED_ALL_ED_FT
You were seen in the Emergency Department for   Abdominal pain. Lab and imaging results, if performed, were discussed with you along with your discharge diagnosis.    You will receive a call to make an appointment with a surgeon. List of providers and their information has been given. Follow up with your doctor in 1 week - bring copies of your results if you were given. If you do not have a primary doctor, please call [phone] to find one convenient for you.     as discussed, please follow-up with urology for CT findings of bony lesions.    Continue all prescribed medications.     To control your pain at home, you should take Ibuprofen 400 mg along with Tylenol 650mg-1000mg every 6 to 8 hours. Limit your maximum daily Tylenol from all sources to 4000mg. Be aware that many other medications contain acetaminophen which is also known as Tylenol. Taking Tylenol and Ibuprofen together has been shown to be more effective at relieving pain than taking them separately. These are both over the counter medications that you can  at your local pharmacy without a prescription. You need to respect all of the warnings on the bottles. You shouldn’t take these medications for more than a week without following up with your doctor. Both medications come with certain risks and side effects that you need to discuss with your doctor, especially if you are taking them for a prolonged period.    Return to ED for any new or worsening symptoms including but not limited to: development of chest pain, shortness of breath, fever, vomiting, focal numbness, weakness or tingling, any severe CP, headache, abdominal pain, back pain.      Rest and keep yourself hydrated with fluids.

## 2023-05-20 NOTE — ED ADULT NURSE REASSESSMENT NOTE - NS ED NURSE REASSESS COMMENT FT1
Pt laying comfortably in bed, NAD, NSR on the monitor. VSS in the flow sheet. Pt denies abdominal pain, shortness of breath, chest pain, headache, dizziness, nausea. Awaiting CT scan.

## 2023-05-20 NOTE — ED PROVIDER NOTE - PATIENT PORTAL LINK FT
You can access the FollowMyHealth Patient Portal offered by [facility] by registering at the following website: [email or web address]. By joining [employer]’s FollowMyHealth portal, you will also be able to view your health information using other applications (apps) compatible with our system.

## 2023-05-21 LAB
CULTURE RESULTS: SIGNIFICANT CHANGE UP
SPECIMEN SOURCE: SIGNIFICANT CHANGE UP

## 2023-08-03 ENCOUNTER — APPOINTMENT (OUTPATIENT)
Dept: CARDIOLOGY | Facility: CLINIC | Age: 83
End: 2023-08-03
Payer: MEDICARE

## 2023-08-03 VITALS
OXYGEN SATURATION: 96 % | DIASTOLIC BLOOD PRESSURE: 71 MMHG | BODY MASS INDEX: 23.92 KG/M2 | SYSTOLIC BLOOD PRESSURE: 112 MMHG | HEIGHT: 63 IN | HEART RATE: 83 BPM | WEIGHT: 135 LBS

## 2023-08-03 DIAGNOSIS — R60.0 LOCALIZED EDEMA: ICD-10-CM

## 2023-08-03 DIAGNOSIS — Z92.89 PERSONAL HISTORY OF OTHER MEDICAL TREATMENT: ICD-10-CM

## 2023-08-03 DIAGNOSIS — Z15.89 GENETIC SUSCEPTIBILITY TO OTHER DISEASE: ICD-10-CM

## 2023-08-03 PROCEDURE — 99215 OFFICE O/P EST HI 40 MIN: CPT | Mod: 25

## 2023-08-03 PROCEDURE — 93000 ELECTROCARDIOGRAM COMPLETE: CPT

## 2023-08-03 RX ORDER — FUROSEMIDE 40 MG/1
40 TABLET ORAL
Qty: 180 | Refills: 3 | Status: ACTIVE | COMMUNITY
Start: 1900-01-01 | End: 1900-01-01

## 2023-08-03 NOTE — DISCUSSION/SUMMARY
[EKG obtained to assist in diagnosis and management of assessed problem(s)] : EKG obtained to assist in diagnosis and management of assessed problem(s) [FreeTextEntry1] : Patient is an 82 year-old black gentleman with history as above who is referred now for evaluation and management of cardiac amyloidosis.  Light chain studies excluded AL-CA (normal kappa : lambda ratio), confirming diagnosis of TTR amyloidosis. 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  Continue TTR stabilizer therapy with Vyndamax. Recommend starting Amvuttra for patient with hereditary TTR amyloidosis with polyneuropathy. Patient is not interested in starting that medication at this time.   Plan to initiate SGLT-2 inhibitor therapy.   Follow up in 3 months, or sooner should need be.

## 2023-08-03 NOTE — HISTORY OF PRESENT ILLNESS
[FreeTextEntry1] : Patient is an 82 year-old Black gentleman with known past medical history of noninsulin dependent type II diabetes, prostate ca, who has been experiencing bilateral lower extremity edema for more than a year, recently admitted to [facility], followed-up as outpatient with Leo Quesada MD, and was noted to have evidence of cardiac amyloidosis. Technetium pyrophosphate scan was positive.  Of note, patient reports neuropathy in his fingers and toes that has been present and progressive for at least the past several years.   Patient's 41 year-old son was recently diagnosed with hereditary TTR amyloidosis.   Patient has not been sick with Covid-19. He has received two shots and a booster, all from [employer].  August 2022 - Patient returns today for follow-up. He started Vyndamax 61 mg daily approximately two weeks ago. He does not notice any change in his condition. He has run out of his loop diuretics, and so he has not been on any diuretic recently.   9/16/2022. Pedro Jurado returns today for scheduled follow up. He is accompanied by his daughter, Radha. Today he is feeling well. He has been taking Vyndamax and furosemide without any difficulty. He has been using a cane when he walks for gait stabilization, but otherwise remains independent. His only complaint today is for neuropathy in his fingers and some lower back pain.  January 2023 - Patient returns today for follow-up in his usual state of health. Patient stopped his Vyndamax on January 1 because he thought it was causing urinary frequency.  He does not believe he is currently taking a diuretic. He has bilateral numbness in the hands, and he has chronic unsteadiness that was previously attributed to Menieres disease.   May 2023 - Patient returns today for follow-up in his usual state of health. He remains on Vyndamax, but he has not yet started silencer therapy.

## 2023-08-03 NOTE — REASON FOR VISIT
[Other: ____] : [unfilled] [Other: _____] : [unfilled] [FreeTextEntry3] : Neal Seaman MD [FreeTextEntry1] : July 2023 - Patient returns today for follow-up in his usual state of health. He remains on Vyndamax, but he has not yet started silencer therapy. He was hospitalized at [facility] in April 2023 because he discontinued his medications at home.  Since he has been home, he has been compliant on his medications. He is doing much better since he is home.

## 2023-08-04 LAB
ALBUMIN SERPL ELPH-MCNC: 3.9 G/DL
ALP BLD-CCNC: 82 U/L
ALT SERPL-CCNC: 24 U/L
ANION GAP SERPL CALC-SCNC: 13 MMOL/L
AST SERPL-CCNC: 23 U/L
BILIRUB SERPL-MCNC: 0.4 MG/DL
BUN SERPL-MCNC: 22 MG/DL
CALCIUM SERPL-MCNC: 8.9 MG/DL
CHLORIDE SERPL-SCNC: 103 MMOL/L
CO2 SERPL-SCNC: 22 MMOL/L
CREAT SERPL-MCNC: 1.04 MG/DL
EGFR: 72 ML/MIN/1.73M2
GLUCOSE SERPL-MCNC: 135 MG/DL
NT-PROBNP SERPL-MCNC: 3081 PG/ML
POTASSIUM SERPL-SCNC: 4.7 MMOL/L
PREALB SERPL NEPH-MCNC: 22 MG/DL
PROT SERPL-MCNC: 6.1 G/DL
SODIUM SERPL-SCNC: 137 MMOL/L

## 2023-10-16 NOTE — ED PROVIDER NOTE - IV ALTEPLASE ADMIN OUTSIDE HIDDEN
1. My chart route specified that patient will schedule scan on her own. Please let her schedule when it is convenient for her.   2. As we discussed during our visit, once I have the results, we will discuss results on the portal.   3. She told me she wanted to do scans on the weekend and in [place] and will schedule herself. If she wants us to look for her, we can find out.   4. She wanted to know if her bones are healthy at baseline and if so, she can start the medication after we discuss.   5. My follow up in January is when it is convenient for her but if she does not like that time, she can self schedule and we can cancel the appt made for her.    show

## 2023-11-02 ENCOUNTER — NON-APPOINTMENT (OUTPATIENT)
Age: 83
End: 2023-11-02

## 2023-11-02 ENCOUNTER — APPOINTMENT (OUTPATIENT)
Dept: CARDIOLOGY | Facility: CLINIC | Age: 83
End: 2023-11-02
Payer: MEDICARE

## 2023-11-02 VITALS
SYSTOLIC BLOOD PRESSURE: 110 MMHG | BODY MASS INDEX: 23.91 KG/M2 | WEIGHT: 135 LBS | DIASTOLIC BLOOD PRESSURE: 68 MMHG | HEART RATE: 90 BPM | OXYGEN SATURATION: 99 %

## 2023-11-02 PROCEDURE — 99215 OFFICE O/P EST HI 40 MIN: CPT | Mod: 25

## 2023-11-02 PROCEDURE — 93000 ELECTROCARDIOGRAM COMPLETE: CPT

## 2023-11-03 LAB
ALBUMIN SERPL ELPH-MCNC: 3.7 G/DL
ALP BLD-CCNC: 92 U/L
ALT SERPL-CCNC: 29 U/L
ANION GAP SERPL CALC-SCNC: 11 MMOL/L
AST SERPL-CCNC: 33 U/L
BASOPHILS # BLD AUTO: 0.07 K/UL
BASOPHILS NFR BLD AUTO: 1.1 %
BILIRUB SERPL-MCNC: 0.4 MG/DL
BUN SERPL-MCNC: 22 MG/DL
CALCIUM SERPL-MCNC: 9 MG/DL
CHLORIDE SERPL-SCNC: 105 MMOL/L
CO2 SERPL-SCNC: 25 MMOL/L
CREAT SERPL-MCNC: 1.14 MG/DL
EGFR: 65 ML/MIN/1.73M2
EOSINOPHIL # BLD AUTO: 0.28 K/UL
EOSINOPHIL NFR BLD AUTO: 4.4 %
GLUCOSE SERPL-MCNC: 100 MG/DL
HCT VFR BLD CALC: 36.3 %
HGB BLD-MCNC: 12 G/DL
IMM GRANULOCYTES NFR BLD AUTO: 0.2 %
LYMPHOCYTES # BLD AUTO: 1.68 K/UL
LYMPHOCYTES NFR BLD AUTO: 26.5 %
MAN DIFF?: NORMAL
MCHC RBC-ENTMCNC: 27.6 PG
MCHC RBC-ENTMCNC: 33.1 GM/DL
MCV RBC AUTO: 83.4 FL
MONOCYTES # BLD AUTO: 0.5 K/UL
MONOCYTES NFR BLD AUTO: 7.9 %
NEUTROPHILS # BLD AUTO: 3.79 K/UL
NEUTROPHILS NFR BLD AUTO: 59.9 %
NT-PROBNP SERPL-MCNC: 3307 PG/ML
PLATELET # BLD AUTO: 263 K/UL
POTASSIUM SERPL-SCNC: 5.3 MMOL/L
PREALB SERPL NEPH-MCNC: 20 MG/DL
PROT SERPL-MCNC: 6 G/DL
RBC # BLD: 4.35 M/UL
RBC # FLD: 17.6 %
SODIUM SERPL-SCNC: 141 MMOL/L
WBC # FLD AUTO: 6.33 K/UL

## 2023-11-16 ENCOUNTER — APPOINTMENT (OUTPATIENT)
Dept: CARDIOLOGY | Facility: CLINIC | Age: 83
End: 2023-11-16

## 2023-11-21 ENCOUNTER — APPOINTMENT (OUTPATIENT)
Dept: PULMONOLOGY | Facility: CLINIC | Age: 83
End: 2023-11-21
Payer: MEDICARE

## 2023-11-21 PROCEDURE — ZZZZZ: CPT

## 2023-11-21 PROCEDURE — 94726 PLETHYSMOGRAPHY LUNG VOLUMES: CPT

## 2023-11-21 PROCEDURE — 94375 RESPIRATORY FLOW VOLUME LOOP: CPT

## 2023-11-21 PROCEDURE — 99214 OFFICE O/P EST MOD 30 MIN: CPT | Mod: 25

## 2023-11-21 PROCEDURE — 94729 DIFFUSING CAPACITY: CPT

## 2023-12-20 ENCOUNTER — INPATIENT (INPATIENT)
Facility: HOSPITAL | Age: 83
LOS: 3 days | Discharge: HOME | End: 2023-12-24
Attending: STUDENT IN AN ORGANIZED HEALTH CARE EDUCATION/TRAINING PROGRAM | Admitting: STUDENT IN AN ORGANIZED HEALTH CARE EDUCATION/TRAINING PROGRAM
Payer: MEDICARE

## 2023-12-20 VITALS
DIASTOLIC BLOOD PRESSURE: 72 MMHG | RESPIRATION RATE: 26 BRPM | HEART RATE: 103 BPM | SYSTOLIC BLOOD PRESSURE: 119 MMHG | OXYGEN SATURATION: 97 % | TEMPERATURE: 99 F

## 2023-12-20 DIAGNOSIS — I50.9 HEART FAILURE, UNSPECIFIED: ICD-10-CM

## 2023-12-20 DIAGNOSIS — Z29.9 ENCOUNTER FOR PROPHYLACTIC MEASURES, UNSPECIFIED: ICD-10-CM

## 2023-12-20 DIAGNOSIS — B34.8 OTHER VIRAL INFECTIONS OF UNSPECIFIED SITE: ICD-10-CM

## 2023-12-20 DIAGNOSIS — E11.9 TYPE 2 DIABETES MELLITUS WITHOUT COMPLICATIONS: ICD-10-CM

## 2023-12-20 DIAGNOSIS — Z92.3 PERSONAL HISTORY OF IRRADIATION: Chronic | ICD-10-CM

## 2023-12-20 DIAGNOSIS — R06.02 SHORTNESS OF BREATH: ICD-10-CM

## 2023-12-20 DIAGNOSIS — Z79.899 OTHER LONG TERM (CURRENT) DRUG THERAPY: ICD-10-CM

## 2023-12-20 DIAGNOSIS — E85.4 ORGAN-LIMITED AMYLOIDOSIS: ICD-10-CM

## 2023-12-20 LAB
ALBUMIN SERPL ELPH-MCNC: 3.4 G/DL — SIGNIFICANT CHANGE UP (ref 3.3–5)
ALBUMIN SERPL ELPH-MCNC: 3.4 G/DL — SIGNIFICANT CHANGE UP (ref 3.3–5)
ALP SERPL-CCNC: 107 U/L — SIGNIFICANT CHANGE UP (ref 40–120)
ALP SERPL-CCNC: 107 U/L — SIGNIFICANT CHANGE UP (ref 40–120)
ALT FLD-CCNC: 28 U/L — SIGNIFICANT CHANGE UP (ref 4–41)
ALT FLD-CCNC: 28 U/L — SIGNIFICANT CHANGE UP (ref 4–41)
ANION GAP SERPL CALC-SCNC: 11 MMOL/L — SIGNIFICANT CHANGE UP (ref 7–14)
ANION GAP SERPL CALC-SCNC: 11 MMOL/L — SIGNIFICANT CHANGE UP (ref 7–14)
APPEARANCE UR: CLEAR — SIGNIFICANT CHANGE UP
APPEARANCE UR: CLEAR — SIGNIFICANT CHANGE UP
APTT BLD: 30.6 SEC — SIGNIFICANT CHANGE UP (ref 24.5–35.6)
APTT BLD: 30.6 SEC — SIGNIFICANT CHANGE UP (ref 24.5–35.6)
AST SERPL-CCNC: 31 U/L — SIGNIFICANT CHANGE UP (ref 4–40)
AST SERPL-CCNC: 31 U/L — SIGNIFICANT CHANGE UP (ref 4–40)
B PERT DNA SPEC QL NAA+PROBE: SIGNIFICANT CHANGE UP
B PERT DNA SPEC QL NAA+PROBE: SIGNIFICANT CHANGE UP
B PERT+PARAPERT DNA PNL SPEC NAA+PROBE: SIGNIFICANT CHANGE UP
B PERT+PARAPERT DNA PNL SPEC NAA+PROBE: SIGNIFICANT CHANGE UP
BASOPHILS # BLD AUTO: 0.05 K/UL — SIGNIFICANT CHANGE UP (ref 0–0.2)
BASOPHILS # BLD AUTO: 0.05 K/UL — SIGNIFICANT CHANGE UP (ref 0–0.2)
BASOPHILS NFR BLD AUTO: 0.6 % — SIGNIFICANT CHANGE UP (ref 0–2)
BASOPHILS NFR BLD AUTO: 0.6 % — SIGNIFICANT CHANGE UP (ref 0–2)
BILIRUB SERPL-MCNC: 0.7 MG/DL — SIGNIFICANT CHANGE UP (ref 0.2–1.2)
BILIRUB SERPL-MCNC: 0.7 MG/DL — SIGNIFICANT CHANGE UP (ref 0.2–1.2)
BILIRUB UR-MCNC: NEGATIVE — SIGNIFICANT CHANGE UP
BILIRUB UR-MCNC: NEGATIVE — SIGNIFICANT CHANGE UP
BLOOD GAS VENOUS COMPREHENSIVE RESULT: SIGNIFICANT CHANGE UP
BLOOD GAS VENOUS COMPREHENSIVE RESULT: SIGNIFICANT CHANGE UP
BORDETELLA PARAPERTUSSIS (RAPRVP): SIGNIFICANT CHANGE UP
BORDETELLA PARAPERTUSSIS (RAPRVP): SIGNIFICANT CHANGE UP
BUN SERPL-MCNC: 17 MG/DL — SIGNIFICANT CHANGE UP (ref 7–23)
BUN SERPL-MCNC: 17 MG/DL — SIGNIFICANT CHANGE UP (ref 7–23)
C PNEUM DNA SPEC QL NAA+PROBE: SIGNIFICANT CHANGE UP
C PNEUM DNA SPEC QL NAA+PROBE: SIGNIFICANT CHANGE UP
CALCIUM SERPL-MCNC: 8.6 MG/DL — SIGNIFICANT CHANGE UP (ref 8.4–10.5)
CALCIUM SERPL-MCNC: 8.6 MG/DL — SIGNIFICANT CHANGE UP (ref 8.4–10.5)
CHLORIDE SERPL-SCNC: 101 MMOL/L — SIGNIFICANT CHANGE UP (ref 98–107)
CHLORIDE SERPL-SCNC: 101 MMOL/L — SIGNIFICANT CHANGE UP (ref 98–107)
CK SERPL-CCNC: 92 U/L — SIGNIFICANT CHANGE UP (ref 30–200)
CK SERPL-CCNC: 92 U/L — SIGNIFICANT CHANGE UP (ref 30–200)
CO2 SERPL-SCNC: 24 MMOL/L — SIGNIFICANT CHANGE UP (ref 22–31)
CO2 SERPL-SCNC: 24 MMOL/L — SIGNIFICANT CHANGE UP (ref 22–31)
COLOR SPEC: YELLOW — SIGNIFICANT CHANGE UP
COLOR SPEC: YELLOW — SIGNIFICANT CHANGE UP
CREAT SERPL-MCNC: 1.02 MG/DL — SIGNIFICANT CHANGE UP (ref 0.5–1.3)
CREAT SERPL-MCNC: 1.02 MG/DL — SIGNIFICANT CHANGE UP (ref 0.5–1.3)
DIFF PNL FLD: NEGATIVE — SIGNIFICANT CHANGE UP
DIFF PNL FLD: NEGATIVE — SIGNIFICANT CHANGE UP
EGFR: 73 ML/MIN/1.73M2 — SIGNIFICANT CHANGE UP
EGFR: 73 ML/MIN/1.73M2 — SIGNIFICANT CHANGE UP
EOSINOPHIL # BLD AUTO: 0.12 K/UL — SIGNIFICANT CHANGE UP (ref 0–0.5)
EOSINOPHIL # BLD AUTO: 0.12 K/UL — SIGNIFICANT CHANGE UP (ref 0–0.5)
EOSINOPHIL NFR BLD AUTO: 1.5 % — SIGNIFICANT CHANGE UP (ref 0–6)
EOSINOPHIL NFR BLD AUTO: 1.5 % — SIGNIFICANT CHANGE UP (ref 0–6)
FLUAV SUBTYP SPEC NAA+PROBE: SIGNIFICANT CHANGE UP
FLUAV SUBTYP SPEC NAA+PROBE: SIGNIFICANT CHANGE UP
FLUBV RNA SPEC QL NAA+PROBE: SIGNIFICANT CHANGE UP
FLUBV RNA SPEC QL NAA+PROBE: SIGNIFICANT CHANGE UP
GLUCOSE BLDC GLUCOMTR-MCNC: 89 MG/DL — SIGNIFICANT CHANGE UP (ref 70–99)
GLUCOSE BLDC GLUCOMTR-MCNC: 89 MG/DL — SIGNIFICANT CHANGE UP (ref 70–99)
GLUCOSE SERPL-MCNC: 134 MG/DL — HIGH (ref 70–99)
GLUCOSE SERPL-MCNC: 134 MG/DL — HIGH (ref 70–99)
GLUCOSE UR QL: NEGATIVE MG/DL — SIGNIFICANT CHANGE UP
GLUCOSE UR QL: NEGATIVE MG/DL — SIGNIFICANT CHANGE UP
HADV DNA SPEC QL NAA+PROBE: SIGNIFICANT CHANGE UP
HADV DNA SPEC QL NAA+PROBE: SIGNIFICANT CHANGE UP
HCOV 229E RNA SPEC QL NAA+PROBE: SIGNIFICANT CHANGE UP
HCOV 229E RNA SPEC QL NAA+PROBE: SIGNIFICANT CHANGE UP
HCOV HKU1 RNA SPEC QL NAA+PROBE: SIGNIFICANT CHANGE UP
HCOV HKU1 RNA SPEC QL NAA+PROBE: SIGNIFICANT CHANGE UP
HCOV NL63 RNA SPEC QL NAA+PROBE: SIGNIFICANT CHANGE UP
HCOV NL63 RNA SPEC QL NAA+PROBE: SIGNIFICANT CHANGE UP
HCOV OC43 RNA SPEC QL NAA+PROBE: SIGNIFICANT CHANGE UP
HCOV OC43 RNA SPEC QL NAA+PROBE: SIGNIFICANT CHANGE UP
HCT VFR BLD CALC: 37.5 % — LOW (ref 39–50)
HCT VFR BLD CALC: 37.5 % — LOW (ref 39–50)
HGB BLD-MCNC: 12.3 G/DL — LOW (ref 13–17)
HGB BLD-MCNC: 12.3 G/DL — LOW (ref 13–17)
HMPV RNA SPEC QL NAA+PROBE: SIGNIFICANT CHANGE UP
HMPV RNA SPEC QL NAA+PROBE: SIGNIFICANT CHANGE UP
HPIV1 RNA SPEC QL NAA+PROBE: SIGNIFICANT CHANGE UP
HPIV1 RNA SPEC QL NAA+PROBE: SIGNIFICANT CHANGE UP
HPIV2 RNA SPEC QL NAA+PROBE: SIGNIFICANT CHANGE UP
HPIV2 RNA SPEC QL NAA+PROBE: SIGNIFICANT CHANGE UP
HPIV3 RNA SPEC QL NAA+PROBE: SIGNIFICANT CHANGE UP
HPIV3 RNA SPEC QL NAA+PROBE: SIGNIFICANT CHANGE UP
HPIV4 RNA SPEC QL NAA+PROBE: SIGNIFICANT CHANGE UP
HPIV4 RNA SPEC QL NAA+PROBE: SIGNIFICANT CHANGE UP
IANC: 5.93 K/UL — SIGNIFICANT CHANGE UP (ref 1.8–7.4)
IANC: 5.93 K/UL — SIGNIFICANT CHANGE UP (ref 1.8–7.4)
IMM GRANULOCYTES NFR BLD AUTO: 0.4 % — SIGNIFICANT CHANGE UP (ref 0–0.9)
IMM GRANULOCYTES NFR BLD AUTO: 0.4 % — SIGNIFICANT CHANGE UP (ref 0–0.9)
INR BLD: 1.18 RATIO — SIGNIFICANT CHANGE UP (ref 0.85–1.18)
INR BLD: 1.18 RATIO — SIGNIFICANT CHANGE UP (ref 0.85–1.18)
KETONES UR-MCNC: NEGATIVE MG/DL — SIGNIFICANT CHANGE UP
KETONES UR-MCNC: NEGATIVE MG/DL — SIGNIFICANT CHANGE UP
LEUKOCYTE ESTERASE UR-ACNC: NEGATIVE — SIGNIFICANT CHANGE UP
LEUKOCYTE ESTERASE UR-ACNC: NEGATIVE — SIGNIFICANT CHANGE UP
LYMPHOCYTES # BLD AUTO: 1.51 K/UL — SIGNIFICANT CHANGE UP (ref 1–3.3)
LYMPHOCYTES # BLD AUTO: 1.51 K/UL — SIGNIFICANT CHANGE UP (ref 1–3.3)
LYMPHOCYTES # BLD AUTO: 18.3 % — SIGNIFICANT CHANGE UP (ref 13–44)
LYMPHOCYTES # BLD AUTO: 18.3 % — SIGNIFICANT CHANGE UP (ref 13–44)
M PNEUMO DNA SPEC QL NAA+PROBE: SIGNIFICANT CHANGE UP
M PNEUMO DNA SPEC QL NAA+PROBE: SIGNIFICANT CHANGE UP
MAGNESIUM SERPL-MCNC: 2.1 MG/DL — SIGNIFICANT CHANGE UP (ref 1.6–2.6)
MAGNESIUM SERPL-MCNC: 2.1 MG/DL — SIGNIFICANT CHANGE UP (ref 1.6–2.6)
MCHC RBC-ENTMCNC: 27.3 PG — SIGNIFICANT CHANGE UP (ref 27–34)
MCHC RBC-ENTMCNC: 27.3 PG — SIGNIFICANT CHANGE UP (ref 27–34)
MCHC RBC-ENTMCNC: 32.8 GM/DL — SIGNIFICANT CHANGE UP (ref 32–36)
MCHC RBC-ENTMCNC: 32.8 GM/DL — SIGNIFICANT CHANGE UP (ref 32–36)
MCV RBC AUTO: 83.1 FL — SIGNIFICANT CHANGE UP (ref 80–100)
MCV RBC AUTO: 83.1 FL — SIGNIFICANT CHANGE UP (ref 80–100)
MONOCYTES # BLD AUTO: 0.61 K/UL — SIGNIFICANT CHANGE UP (ref 0–0.9)
MONOCYTES # BLD AUTO: 0.61 K/UL — SIGNIFICANT CHANGE UP (ref 0–0.9)
MONOCYTES NFR BLD AUTO: 7.4 % — SIGNIFICANT CHANGE UP (ref 2–14)
MONOCYTES NFR BLD AUTO: 7.4 % — SIGNIFICANT CHANGE UP (ref 2–14)
NEUTROPHILS # BLD AUTO: 5.93 K/UL — SIGNIFICANT CHANGE UP (ref 1.8–7.4)
NEUTROPHILS # BLD AUTO: 5.93 K/UL — SIGNIFICANT CHANGE UP (ref 1.8–7.4)
NEUTROPHILS NFR BLD AUTO: 71.8 % — SIGNIFICANT CHANGE UP (ref 43–77)
NEUTROPHILS NFR BLD AUTO: 71.8 % — SIGNIFICANT CHANGE UP (ref 43–77)
NITRITE UR-MCNC: NEGATIVE — SIGNIFICANT CHANGE UP
NITRITE UR-MCNC: NEGATIVE — SIGNIFICANT CHANGE UP
NRBC # BLD: 0 /100 WBCS — SIGNIFICANT CHANGE UP (ref 0–0)
NRBC # BLD: 0 /100 WBCS — SIGNIFICANT CHANGE UP (ref 0–0)
NRBC # FLD: 0 K/UL — SIGNIFICANT CHANGE UP (ref 0–0)
NRBC # FLD: 0 K/UL — SIGNIFICANT CHANGE UP (ref 0–0)
NT-PROBNP SERPL-SCNC: 5502 PG/ML — HIGH
NT-PROBNP SERPL-SCNC: 5502 PG/ML — HIGH
PH UR: 7 — SIGNIFICANT CHANGE UP (ref 5–8)
PH UR: 7 — SIGNIFICANT CHANGE UP (ref 5–8)
PHOSPHATE SERPL-MCNC: 2.9 MG/DL — SIGNIFICANT CHANGE UP (ref 2.5–4.5)
PHOSPHATE SERPL-MCNC: 2.9 MG/DL — SIGNIFICANT CHANGE UP (ref 2.5–4.5)
PLATELET # BLD AUTO: 258 K/UL — SIGNIFICANT CHANGE UP (ref 150–400)
PLATELET # BLD AUTO: 258 K/UL — SIGNIFICANT CHANGE UP (ref 150–400)
POTASSIUM SERPL-MCNC: 4.5 MMOL/L — SIGNIFICANT CHANGE UP (ref 3.5–5.3)
POTASSIUM SERPL-MCNC: 4.5 MMOL/L — SIGNIFICANT CHANGE UP (ref 3.5–5.3)
POTASSIUM SERPL-SCNC: 4.5 MMOL/L — SIGNIFICANT CHANGE UP (ref 3.5–5.3)
POTASSIUM SERPL-SCNC: 4.5 MMOL/L — SIGNIFICANT CHANGE UP (ref 3.5–5.3)
PROT SERPL-MCNC: 6.6 G/DL — SIGNIFICANT CHANGE UP (ref 6–8.3)
PROT SERPL-MCNC: 6.6 G/DL — SIGNIFICANT CHANGE UP (ref 6–8.3)
PROT UR-MCNC: NEGATIVE MG/DL — SIGNIFICANT CHANGE UP
PROT UR-MCNC: NEGATIVE MG/DL — SIGNIFICANT CHANGE UP
PROTHROM AB SERPL-ACNC: 13.2 SEC — HIGH (ref 9.5–13)
PROTHROM AB SERPL-ACNC: 13.2 SEC — HIGH (ref 9.5–13)
RAPID RVP RESULT: DETECTED
RAPID RVP RESULT: DETECTED
RBC # BLD: 4.51 M/UL — SIGNIFICANT CHANGE UP (ref 4.2–5.8)
RBC # BLD: 4.51 M/UL — SIGNIFICANT CHANGE UP (ref 4.2–5.8)
RBC # FLD: 17.8 % — HIGH (ref 10.3–14.5)
RBC # FLD: 17.8 % — HIGH (ref 10.3–14.5)
RSV RNA SPEC QL NAA+PROBE: SIGNIFICANT CHANGE UP
RSV RNA SPEC QL NAA+PROBE: SIGNIFICANT CHANGE UP
RV+EV RNA SPEC QL NAA+PROBE: DETECTED
RV+EV RNA SPEC QL NAA+PROBE: DETECTED
SARS-COV-2 RNA SPEC QL NAA+PROBE: SIGNIFICANT CHANGE UP
SARS-COV-2 RNA SPEC QL NAA+PROBE: SIGNIFICANT CHANGE UP
SODIUM SERPL-SCNC: 136 MMOL/L — SIGNIFICANT CHANGE UP (ref 135–145)
SODIUM SERPL-SCNC: 136 MMOL/L — SIGNIFICANT CHANGE UP (ref 135–145)
SP GR SPEC: 1.02 — SIGNIFICANT CHANGE UP (ref 1–1.03)
SP GR SPEC: 1.02 — SIGNIFICANT CHANGE UP (ref 1–1.03)
TROPONIN T, HIGH SENSITIVITY RESULT: 67 NG/L — CRITICAL HIGH
TROPONIN T, HIGH SENSITIVITY RESULT: 67 NG/L — CRITICAL HIGH
TROPONIN T, HIGH SENSITIVITY RESULT: 70 NG/L — CRITICAL HIGH
TROPONIN T, HIGH SENSITIVITY RESULT: 70 NG/L — CRITICAL HIGH
UROBILINOGEN FLD QL: 1 MG/DL — SIGNIFICANT CHANGE UP (ref 0.2–1)
UROBILINOGEN FLD QL: 1 MG/DL — SIGNIFICANT CHANGE UP (ref 0.2–1)
WBC # BLD: 8.25 K/UL — SIGNIFICANT CHANGE UP (ref 3.8–10.5)
WBC # BLD: 8.25 K/UL — SIGNIFICANT CHANGE UP (ref 3.8–10.5)
WBC # FLD AUTO: 8.25 K/UL — SIGNIFICANT CHANGE UP (ref 3.8–10.5)
WBC # FLD AUTO: 8.25 K/UL — SIGNIFICANT CHANGE UP (ref 3.8–10.5)

## 2023-12-20 PROCEDURE — 99223 1ST HOSP IP/OBS HIGH 75: CPT

## 2023-12-20 PROCEDURE — 71045 X-RAY EXAM CHEST 1 VIEW: CPT | Mod: 26

## 2023-12-20 PROCEDURE — 99285 EMERGENCY DEPT VISIT HI MDM: CPT

## 2023-12-20 PROCEDURE — 74177 CT ABD & PELVIS W/CONTRAST: CPT | Mod: 26,MA

## 2023-12-20 RX ORDER — ACETAMINOPHEN 500 MG
650 TABLET ORAL EVERY 6 HOURS
Refills: 0 | Status: DISCONTINUED | OUTPATIENT
Start: 2023-12-20 | End: 2023-12-24

## 2023-12-20 RX ORDER — ACETAMINOPHEN 500 MG
650 TABLET ORAL ONCE
Refills: 0 | Status: COMPLETED | OUTPATIENT
Start: 2023-12-20 | End: 2023-12-20

## 2023-12-20 RX ORDER — PREDNISOLONE SODIUM PHOSPHATE 1 %
1 DROPS OPHTHALMIC (EYE)
Refills: 0 | DISCHARGE

## 2023-12-20 RX ORDER — FUROSEMIDE 40 MG
40 TABLET ORAL ONCE
Refills: 0 | Status: COMPLETED | OUTPATIENT
Start: 2023-12-20 | End: 2023-12-20

## 2023-12-20 RX ORDER — INSULIN LISPRO 100/ML
VIAL (ML) SUBCUTANEOUS AT BEDTIME
Refills: 0 | Status: DISCONTINUED | OUTPATIENT
Start: 2023-12-20 | End: 2023-12-21

## 2023-12-20 RX ORDER — DEXTROSE 50 % IN WATER 50 %
15 SYRINGE (ML) INTRAVENOUS ONCE
Refills: 0 | Status: DISCONTINUED | OUTPATIENT
Start: 2023-12-20 | End: 2023-12-21

## 2023-12-20 RX ORDER — SIMVASTATIN 20 MG/1
20 TABLET, FILM COATED ORAL AT BEDTIME
Refills: 0 | Status: DISCONTINUED | OUTPATIENT
Start: 2023-12-20 | End: 2023-12-24

## 2023-12-20 RX ORDER — SODIUM CHLORIDE 9 MG/ML
1000 INJECTION, SOLUTION INTRAVENOUS
Refills: 0 | Status: DISCONTINUED | OUTPATIENT
Start: 2023-12-20 | End: 2023-12-21

## 2023-12-20 RX ORDER — FUROSEMIDE 40 MG
40 TABLET ORAL
Refills: 0 | Status: COMPLETED | OUTPATIENT
Start: 2023-12-20 | End: 2023-12-21

## 2023-12-20 RX ORDER — INSULIN LISPRO 100/ML
VIAL (ML) SUBCUTANEOUS
Refills: 0 | Status: DISCONTINUED | OUTPATIENT
Start: 2023-12-20 | End: 2023-12-21

## 2023-12-20 RX ORDER — GABAPENTIN 400 MG/1
100 CAPSULE ORAL AT BEDTIME
Refills: 0 | Status: DISCONTINUED | OUTPATIENT
Start: 2023-12-20 | End: 2023-12-24

## 2023-12-20 RX ORDER — PANTOPRAZOLE SODIUM 20 MG/1
40 TABLET, DELAYED RELEASE ORAL
Refills: 0 | Status: DISCONTINUED | OUTPATIENT
Start: 2023-12-20 | End: 2023-12-24

## 2023-12-20 RX ORDER — GLUCAGON INJECTION, SOLUTION 0.5 MG/.1ML
1 INJECTION, SOLUTION SUBCUTANEOUS ONCE
Refills: 0 | Status: DISCONTINUED | OUTPATIENT
Start: 2023-12-20 | End: 2023-12-21

## 2023-12-20 RX ORDER — DEXTROSE 50 % IN WATER 50 %
25 SYRINGE (ML) INTRAVENOUS ONCE
Refills: 0 | Status: DISCONTINUED | OUTPATIENT
Start: 2023-12-20 | End: 2023-12-21

## 2023-12-20 RX ADMIN — Medication 650 MILLIGRAM(S): at 13:33

## 2023-12-20 RX ADMIN — Medication 40 MILLIGRAM(S): at 14:51

## 2023-12-20 RX ADMIN — Medication 100 MILLIGRAM(S): at 14:52

## 2023-12-20 NOTE — ED PROVIDER NOTE - PROGRESS NOTE DETAILS
Gilles Morales MD:  CTAP with SBO, surgery paged Received patient on signout from Dr. Charles pending CT.  CT resulted SBO right inguinal hernia.  Patient and family updated.  Patient  Nontender abdomen.  No vomiting.  Pending surgery evaluation

## 2023-12-20 NOTE — H&P ADULT - PROBLEM SELECTOR PLAN 3
c/w symptomatic tx w/ tylenol, robitussin Pt on home Vyndamax.   - f/u w/ pt's family in regards to bringing home medication in .

## 2023-12-20 NOTE — ED PROVIDER NOTE - PHYSICAL EXAMINATION
Physical Exam:  General: NAD, Conversive  Eyes: EOMI, Conjunctiva and sclera clear  Neck: + JVD  Lungs: + diffuse rhonchi  Heart: Normal S1, S2, no murmurs  Abdomen: Firm, + mildly distended, no CVA tenderness  Extremities: 2+ peripheral pulses, + 2+ LE edema b/l  Psych: AAO X3  Neurologic: Non-focal

## 2023-12-20 NOTE — H&P ADULT - PROBLEM SELECTOR PLAN 2
Pt on home Vyndamax.   - f/u w/ pt's family in regards to bringing home medication in . CT a/p showing small bowel obstruction to the level of the a right inguinal hernia with surrounding fluid. Pt seen and evaluated by surgery. inguinal hernia reduced in ED, not obstructed, no signs of strangulation per surgery. Pt currently w/o abd pain, n/v, last BM yesterday. Plan for hernia repair with surgery outpatient vs during admission.  - f/u surgery recs

## 2023-12-20 NOTE — ED PROCEDURE NOTE - PROCEDURE ADDITIONAL DETAILS
Emergency Department Focused Ultrasound performed at patient's bedside for educational purposes. Echo performed in presence of Dr. Valarie Maradiaga. XR chest was obtained as follow up study. -Amanda Block MD

## 2023-12-20 NOTE — H&P ADULT - PROBLEM SELECTOR PLAN 1
Pt w/ worsening SOB and b/l LE edema. BNP elevated to 5500. S/p IV lasix 40 mg x1 in ED. TTE from 5/2023 w/ LVEF 53% decreased right ventricular systolic function, elevated PASP.  - c/w IV lasix 40 mg BID  - monitor I/Os  - tele  - TTE

## 2023-12-20 NOTE — ED PROVIDER NOTE - ATTENDING CONTRIBUTION TO CARE
Attending note:   After face to face evaluation of this patient, I concur with above noted hx, pe, and care plan for this patient.  Charles: 81-year-old male with history of cardiac amyloidosis and CHF Last EF in the 50s.  Patient presents to ED with 1 week of cough and URI symptoms and worsening dyspnea and orthopnea and PND.  Patient is also having lower extremity edema.  Since patient stopped feeling well he stopped taking his medications as well.  Today patient is febrile rectally but not tachycardic but respiratory rate is in the mid 30s and patient appears to have some mild to moderate respiratory distress.  Patient's [employer] up and JVD last last 2-3+ pitting edema.  Patient has 8/10 abdomen especially in the right upper quadrant.  Firm to palpation no significant tenderness.  There is also a significant right-sided inguinal hernia that is minimally tender to palpation as well.  Heart is regular.  Patient is in CHF exacerbation likely due to lack of meds possibly due to an underlying viral syndrome.  But given tense abdomen which may be all fluid related but given the hernia we will also get a CT abdomen.  Patient will be given Lasix and admitted for further monitoring of diuresis.  This case was discussed with patient's cardiologist Dr. Jason Laurent who recommended starting with loop diuretic.  Case was also discussed with heart failure team who recommended Lasix 40 IV twice daily.  Patient is awaiting chest x-ray and CT abdomen at this time prior to admission.  Patient also tested positive for enterovirus.  Daughter is at bedside and is aware of all results. Attending note:   After face to face evaluation of this patient, I concur with above noted hx, pe, and care plan for this patient.  Charles: 81-year-old male with history of cardiac amyloidosis and CHF Last EF in the 50s.  Patient presents to ED with 1 week of cough and URI symptoms and worsening dyspnea and orthopnea and PND.  Patient is also having lower extremity edema.  Since patient stopped feeling well he stopped taking his medications as well.  Today patient is febrile rectally but not tachycardic but respiratory rate is in the mid 30s and patient appears to have some mild to moderate respiratory distress.  Patient's [employer] [facility] living up and JVD last last 2-3+ pitting edema.  Patient has 8/10 abdomen especially in the right upper quadrant.  Firm to palpation no significant tenderness.  There is also a significant right-sided inguinal hernia that is minimally tender to palpation as well.  Heart is regular.  Patient is in CHF exacerbation likely due to lack of meds possibly due to an underlying viral syndrome.  But given tense abdomen which may be all fluid related but given the hernia we will also get a CT abdomen.  Patient will be given Lasix and admitted for further monitoring of diuresis.  This case was discussed with patient's cardiologist Dr. Jason Laurent who recommended starting with loop diuretic.  Case was also discussed with heart failure team who recommended Lasix 40 IV twice daily.  Patient is awaiting chest x-ray and CT abdomen at this time prior to admission.  Patient also tested positive for enterovirus.  Daughter is at bedside and is aware of all results.

## 2023-12-20 NOTE — H&P ADULT - NSHPSOURCEINFORD_GEN_ALL_CORE
[facility] [place]  [address]  [facility] [address]  [phone]      Kvng Portillo :2002 MRN:[id number]      2019 Time Session Began: 4.30pm  Time Session Ended: 5pm    Session Type:45 Minute Therapy (36750)    Others Present: na    Intervention: Behavioral, Cognitive, Supportive    Suicide/Homicide/Violence Ideation: No    If Yes, explain: NA    Current Outpatient Medications   Medication Sig   • lisdexamfetamine (VYVANSE) 20 MG capsule Take 1 capsule by mouth every morning.   • lisdexamfetamine (VYVANSE) 30 MG capsule Take 1 capsule by mouth every morning.   • sertraline (ZOLOFT) 25 MG tablet Take 1 tablet by mouth nightly with dinner.     No current facility-administered medications for this visit.        Change in Medication(s) Reported: No  If Yes, explain: na    Patient/Family Education Provided: Yes  Patient/Family Displays Understanding: Yes    If No, explain: na    Chief complaint in patient's own words: \"I'm feeling a lot better.\"    DARP:     D) Client attended follow-up individual psychotherapy session, reported no homicidal ideation, no suicidal ideation, and no AODA use concerns at this time.  Client shared thoughts and feelings on how he was doing well in school, socially, and with his family.  Processed how he had learned to have a positive attitude about challenging himself to do new things, \"it's trial and error and that's how you grow.\"  Reviewed his gains and how he felt confident to manage his concerns without further treatment sessions.  A) Writer used open ended questions, restatements, and reflections of feelings to encourage exploration of issues and foster client insight into reported concerns.  Writer hypothesizes the client is in action with his social and personal endeavors. Praised client for his development as a person, agreed that the discharge was  appropriate given his reported  Improvements.  R) Client was active and engaged in session, openly shared thoughts and feelings during discussions.  Client verbalized willingness to work on agreed upon tasks to address chief concerns.  Client affect was appropriate to content of session, was readily sharing how he was doing well and had a positive outlook on his future.  P) Client successfully discharged from treatment.    Need for Community Resources Assessed: Yes    Resources Needed: No    If Yes, what resources: na    Primary Diagnosis: Adjustment Disorder w/Depressed Mood  : 2 Moderate    Treatment Plan: Unchanged    Discharge Plan: Strategies Discussed to Maintain Gains    Next Appointment:  NA - discharged  Soto Ovalles LPC         DISCHARGE SUMMARY  To be completed within 30 days of last clinical contact    Kvng Portillo : 2002 MRN: [id number]    Date of Intake: 19 Date of Last Session: 2019    Chief Complaint: \"stress.\"    Admission Diagnosis:adjustment disorder with mixed affect    D/C Diagnosis Codes: F43.23 Adjustment disorder with mixed anxiety and depressed mood  (primary encounter diagnosis)  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  Client was able to recognize his stressors and develop improved mindsets for challenging himself to grow and engage in healthy activities.    Current Outpatient Medications   Medication Sig Dispense Refill   • lisdexamfetamine (VYVANSE) 20 MG capsule Take 1 capsule by mouth every morning. 30 capsule 0   • lisdexamfetamine  (VYVANSE) 30 MG capsule Take 1 capsule by mouth every morning. 30 capsule 0   • sertraline (ZOLOFT) 25 MG tablet Take 1 tablet by mouth nightly with dinner. 30 tablet 2     No current facility-administered medications for this visit.        Discharge Recommendations: (Include names and addresses of facilities, persons or programs the patient was referred to following discharge.) NA    Remaining Discharge Needs: (Include treatment issues not addressed.) GUILHERME Ovalles LPC Date: 11/22/2019   Time: 9:49 AM     Chart(s)/Patient

## 2023-12-20 NOTE — ED PROVIDER NOTE - OBJECTIVE STATEMENT
83 Y M H/O DM, HTN, prior prostate cancer, Pulmonary HTN, Cardiac amyloidosis,  on tafamidis, presenting with URI symptoms (nasal congestion cough), and increased SOB, extremity swelling in setting of discontinuing home medications, takes 40 furosemide daily, denies any abdominal pain, nausea, vomiting.

## 2023-12-20 NOTE — CONSULT NOTE ADULT - SUBJECTIVE AND OBJECTIVE BOX
ACUTE CARE SURGERY CONSULT NOTE  --------------------------------------------------------------------------------------------    Patient is a 83y old  Male who presents with a chief complaint of cough    HPI: 83M cardiac amyloid, volume overload on lasix, htn, DM, prostate CA s/p brachytherapy, prior CCY, now with [facility]. Has known about it for years, usually reduces.  Has been "out" since Last thursday.  No pain, not obstructed passing gas and stool, no vomiting, tolerating PO.  Presented because of cough today and SOB.  Surgery consulted after ED unable to reduce hernia.  Hernia containing corner of bladder and bowel, after voiding, hernia was reduced in ED.       ROS: 10-system review is otherwise negative except HPI above.      PAST MEDICAL & SURGICAL HISTORY:  Malignant neoplasm of prostate      Meniere disease      Severe pulmonary hypertension      Diabetes mellitus, type 2      Edema of both lower legs      S/P Appendectomy      S/P colonoscopy with polypectomy   - benign      H/O left inguinal hernia repair      Arm fracture, right  during childhood      S/P laparoscopic cholecystectomy        History of brachytherapy        FAMILY HISTORY:  Family history of hypertension (Father, Mother, Sibling, Sibling)    Family history of diabetes mellitus (Father, Mother, Sibling, Sibling)        SOCIAL HISTORY:      ALLERGIES: No Known Allergies      HOME MEDICATIONS:     CURRENT MEDICATIONS  MEDICATIONS (STANDING):   MEDICATIONS (PRN):  --------------------------------------------------------------------------------------------    Vitals:   T(C): 37.4 (23 @ 21:01), Max: 38.2 (23 @ 12:54)  HR: 95 (23 @ 21:01) (88 - 103)  BP: 118/76 (23 @ 21:01) (114/67 - 126/81)  RR: 26 (23 @ 21:01) (20 - )  SpO2: 100% (23 @ 21:01) (97% - 100%)  CAPILLARY BLOOD GLUCOSE      POCT Blood Glucose.: 90 mg/dL (20 Dec 2023 18:01)  POCT Blood Glucose.: 179 mg/dL (20 Dec 2023 11:49)    CAPILLARY BLOOD GLUCOSE      POCT Blood Glucose.: 90 mg/dL (20 Dec 2023 18:01)  POCT Blood Glucose.: 179 mg/dL (20 Dec 2023 11:49)          PHYSICAL EXAM:   General: NAD, Lying in bed comfortably  Neuro: A+Ox3  HEENT: NC/AT, EOMI  Neck: Soft, supple  Cardio: RRR   Resp: mildly labored, room air, +cough   GI/Abd: Soft, NT/ND, no rebound/guarding, no masses palpated  groin: RIH palpable, reduced at bedside no skin changes   Musculoskeletal: All 4 extremities moving spontaneously, no limitations.  --------------------------------------------------------------------------------------------    LABS  CBC ( @ 12:40)                              12.3<L>                         8.25    )----------------(  258        71.8  % Neutrophils, 18.3  % Lymphocytes, ANC: 5.93                                37.5<L>    BMP ( @ 12:40)             136     |  101     |  17    		Ca++ --      Ca 8.6                ---------------------------------( 134<H>		Mg 2.10               4.5     |  24      |  1.02  			Ph 2.9       LFTs ( @ 12:40)      TPro 6.6 / Alb 3.4 / TBili 0.7 / DBili -- / AST 31 / ALT 28 / AlkPhos 107    Coags ( @ 12:40)  aPTT 30.6 / INR 1.18 / PT 13.2<H>    Cardiac Markers ( @ 12:40)     HSTrop: -- / CKMB: -- / CK: 92    ABG ( @ 17:00)      /  /  /  /  / %     Lactate:  1.2    VBG ( @ 12:40)     7.34 / 49 / 35 / 26 / 0.1 / 27.5<L>%     Lactate: 2.6<H>    --------------------------------------------------------------------------------------------    MICROBIOLOGY  Urinalysis ( @ 18:11):     Color: Yellow / Appearance: Clear / S.022 / pH: 7.0 / Gluc: Negative / Ketones: Negative / Bili: Negative / Urobili: 1.0 / Protein :Negative / Nitrites: Negative / Leuk.Est: Negative / RBC:  / WBC:  / Sq Epi:  / Non Sq Epi:  / Bacteria          --------------------------------------------------------------------------------------------    IMAGING  < from: CT Abdomen and Pelvis w/ IV Cont (23 @ 16:54) >  PROCEDURE:  CT of the Abdomen and Pelvis was performed.  Sagittal and coronal reformats were performed.    FINDINGS:  LOWER CHEST: Small bilateral pleural effusions and associated   atelectasis. Coronary artery calcifications.    LIVER: Clips and calcifications in the dori hepatis, unchanged since   prior study.  BILE DUCTS: Extrahepatic biliary duct measures 1.5 cm..  GALLBLADDER: Cholecystectomy.  SPLEEN: Within normal limits.  PANCREAS: Within normal limits.  ADRENALS: Within normal limits.  KIDNEYS/URETERS: No hydronephrosis.    BLADDER: Within normal limits. Anterior bladder wall extends in to the   right inguinal hernia.  REPRODUCTIVE ORGANS: Brachytherapy beads. Scrotal hernia with surrounding   fluid collection.    BOWEL: Small bowel obstruction to the level of the a right inguinal   hernia with surrounding fluid. Appendix is not visualized. No evidence of   inflammation in the pericecal region.  PERITONEUM: No ascites.  VESSELS: Atherosclerotic changes. 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  RETROPERITONEUM/LYMPH NODES: No lymphadenopathy.  ABDOMINAL WALL: Bowel and bladder containing right inguinal hernia   contained within the scrotum. Anasarca.  BONES: Dextroscoliosis of the lumbar spine. Degenerative changes. Again   seen is sclerosis in the right acetabulum and right ischial tuberosity.    IMPRESSION:  Small bowel obstruction to the level of the a right inguinal hernia with   surrounding fluid. Bladder also extends into the right inguinal hernia.    Extrahepatic biliary ductal dilation up to 1.5 cm.         ACUTE CARE SURGERY CONSULT NOTE  --------------------------------------------------------------------------------------------    Patient is a 83y old  Male who presents with a chief complaint of cough    HPI: 83M cardiac amyloid, volume overload on lasix, htn, DM, prostate CA s/p brachytherapy, prior CCY, now with [facility]. Has known about it for years, usually reduces.  Has been "out" since Last thursday.  No pain, not obstructed passing gas and stool, no vomiting, tolerating PO.  Presented because of cough today and SOB.  Surgery consulted after ED unable to reduce hernia.  Hernia containing corner of bladder and bowel, after voiding, hernia was reduced in ED.       ROS: 10-system review is otherwise negative except HPI above.      PAST MEDICAL & SURGICAL HISTORY:  Malignant neoplasm of prostate      Meniere disease      Severe pulmonary hypertension      Diabetes mellitus, type 2      Edema of both lower legs      S/P Appendectomy      S/P colonoscopy with polypectomy   - benign      H/O left inguinal hernia repair      Arm fracture, right  during childhood      S/P laparoscopic cholecystectomy        History of brachytherapy        FAMILY HISTORY:  Family history of hypertension (Father, Mother, Sibling, Sibling)    Family history of diabetes mellitus (Father, Mother, Sibling, Sibling)        SOCIAL HISTORY:      ALLERGIES: No Known Allergies      HOME MEDICATIONS:     CURRENT MEDICATIONS  MEDICATIONS (STANDING):   MEDICATIONS (PRN):  --------------------------------------------------------------------------------------------    Vitals:   T(C): 37.4 (23 @ 21:01), Max: 38.2 (23 @ 12:54)  HR: 95 (23 @ 21:01) (88 - 103)  BP: 118/76 (23 @ 21:01) (114/67 - 126/81)  RR: 26 (23 @ 21:01) (20 - )  SpO2: 100% (23 @ 21:01) (97% - 100%)  CAPILLARY BLOOD GLUCOSE      POCT Blood Glucose.: 90 mg/dL (20 Dec 2023 18:01)  POCT Blood Glucose.: 179 mg/dL (20 Dec 2023 11:49)    CAPILLARY BLOOD GLUCOSE      POCT Blood Glucose.: 90 mg/dL (20 Dec 2023 18:01)  POCT Blood Glucose.: 179 mg/dL (20 Dec 2023 11:49)          PHYSICAL EXAM:   General: NAD, Lying in bed comfortably  Neuro: A+Ox3  HEENT: NC/AT, EOMI  Neck: Soft, supple  Cardio: RRR   Resp: mildly labored, room air, +cough   GI/Abd: Soft, NT/ND, no rebound/guarding, no masses palpated  groin: RIH palpable, reduced at bedside no skin changes   Musculoskeletal: All 4 extremities moving spontaneously, no limitations.  --------------------------------------------------------------------------------------------    LABS  CBC ( @ 12:40)                              12.3<L>                         8.25    )----------------(  258        71.8  % Neutrophils, 18.3  % Lymphocytes, ANC: 5.93                                37.5<L>    BMP ( @ 12:40)             136     |  101     |  17    		Ca++ --      Ca 8.6                ---------------------------------( 134<H>		Mg 2.10               4.5     |  24      |  1.02  			Ph 2.9       LFTs ( @ 12:40)      TPro 6.6 / Alb 3.4 / TBili 0.7 / DBili -- / AST 31 / ALT 28 / AlkPhos 107    Coags ( @ 12:40)  aPTT 30.6 / INR 1.18 / PT 13.2<H>    Cardiac Markers ( @ 12:40)     HSTrop: -- / CKMB: -- / CK: 92    ABG ( @ 17:00)      /  /  /  /  / %     Lactate:  1.2    VBG ( @ 12:40)     7.34 / 49 / 35 / 26 / 0.1 / 27.5<L>%     Lactate: 2.6<H>    --------------------------------------------------------------------------------------------    MICROBIOLOGY  Urinalysis ( @ 18:11):     Color: Yellow / Appearance: Clear / S.022 / pH: 7.0 / Gluc: Negative / Ketones: Negative / Bili: Negative / Urobili: 1.0 / Protein :Negative / Nitrites: Negative / Leuk.Est: Negative / RBC:  / WBC:  / Sq Epi:  / Non Sq Epi:  / Bacteria          --------------------------------------------------------------------------------------------    IMAGING  < from: CT Abdomen and Pelvis w/ IV Cont (23 @ 16:54) >  PROCEDURE:  CT of the Abdomen and Pelvis was performed.  Sagittal and coronal reformats were performed.    FINDINGS:  LOWER CHEST: Small bilateral pleural effusions and associated   atelectasis. Coronary artery calcifications.    LIVER: Clips and calcifications in the dori hepatis, unchanged since   prior study.  BILE DUCTS: Extrahepatic biliary duct measures 1.5 cm..  GALLBLADDER: Cholecystectomy.  SPLEEN: Within normal limits.  PANCREAS: Within normal limits.  ADRENALS: Within normal limits.  KIDNEYS/URETERS: No hydronephrosis.    BLADDER: Within normal limits. Anterior bladder wall extends in to the   right inguinal hernia.  REPRODUCTIVE ORGANS: Brachytherapy beads. Scrotal hernia with surrounding   fluid collection.    BOWEL: Small bowel obstruction to the level of the a right inguinal   hernia with surrounding fluid. Appendix is not visualized. No evidence of   inflammation in the pericecal region.  PERITONEUM: No ascites.  VESSELS: Atherosclerotic changes. 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  RETROPERITONEUM/LYMPH NODES: No lymphadenopathy.  ABDOMINAL WALL: Bowel and bladder containing right inguinal hernia   contained within the scrotum. Anasarca.  BONES: Dextroscoliosis of the lumbar spine. Degenerative changes. Again   seen is sclerosis in the right acetabulum and right ischial tuberosity.    IMPRESSION:  Small bowel obstruction to the level of the a right inguinal hernia with   surrounding fluid. Bladder also extends into the right inguinal hernia.    Extrahepatic biliary ductal dilation up to 1.5 cm.

## 2023-12-20 NOTE — ED ADULT NURSE REASSESSMENT NOTE - NS ED NURSE REASSESS COMMENT FT1
Pt at baseline mental status, resting in stretcher. Out of bed with assistance. VS as charted. Repeat labs drawn and sent. NSR on CM. Dispo pending.
Pt A&O4 denies chest pain, SOB, abd pain. Normal sinus rhythm on tele. Awaiting bed. Daughter at the bedside. vital signs as charted.

## 2023-12-20 NOTE — H&P ADULT - HISTORY OF PRESENT ILLNESS
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 Pt states he been having a productive cough. Reports SOB on exertion. He states he has been compliant with his medications including his lasix, which he takes 40 mg QD. He denies any fever, chills, chest pain, abd pain, n/v/d. He states his last BM was yesterday.    In ED, vitals T 100.7, , /72, RR 26, O2 sat 97% on RA  Labs significant for: trop 70-67, lactate 2.6 --> 1.2, BNP 5502, entero/rhinovirus +  EKG personally reviewed: pending  CXR:   Imaging: CT a/p: IMPRESSION:  Small bowel obstruction to the level of the a right inguinal hernia with   surrounding fluid. Bladder also extends into the right inguinal hernia.    Extrahepatic biliary ductal dilation up to 1.5 cm.  ED management: IV lasix 40 mg x1, tylenol, robitussin

## 2023-12-20 NOTE — H&P ADULT - ASSESSMENT
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 Pt found to be entero/rhinovirus positive in [facility]. Pt admitted for further management.

## 2023-12-20 NOTE — H&P ADULT - NSHPREVIEWOFSYSTEMS_GEN_ALL_CORE
REVIEW OF SYSTEMS:    CONSTITUTIONAL: No weakness, fevers or chills  EYES/ENT: No visual changes;  No vertigo or throat pain   NECK: No pain or stiffness  RESPIRATORY: + cough. No wheezing, hemoptysis; + shortness of breath  CARDIOVASCULAR: No chest pain or palpitations  GASTROINTESTINAL: No abdominal or epigastric pain. No nausea, vomiting, or hematemesis; No diarrhea or constipation. No melena or hematochezia.  GENITOURINARY: No dysuria, frequency or hematuria  NEUROLOGICAL: No numbness or weakness  SKIN: No itching, rashes

## 2023-12-20 NOTE — H&P ADULT - NSHPLABSRESULTS_GEN_ALL_CORE
.  LABS:                         12.3   8.25  )-----------( 258      ( 20 Dec 2023 12:40 )             37.5     12-    136  |  101  |  17  ----------------------------<  134<H>  4.5   |  24  |  1.02    Ca    8.6      20 Dec 2023 12:40  Phos  2.9     12  Mg     2.10     20    TPro  6.6  /  Alb  3.4  /  TBili  0.7  /  DBili  x   /  AST  31  /  ALT  28  /  AlkPhos  107  12-20    PT/INR - ( 20 Dec 2023 12:40 )   PT: 13.2 sec;   INR: 1.18 ratio         PTT - ( 20 Dec 2023 12:40 )  PTT:30.6 sec  Urinalysis Basic - ( 20 Dec 2023 18:11 )    Color: Yellow / Appearance: Clear / S.022 / pH: x  Gluc: x / Ketone: Negative mg/dL  / Bili: Negative / Urobili: 1.0 mg/dL   Blood: x / Protein: Negative mg/dL / Nitrite: Negative   Leuk Esterase: Negative / RBC: x / WBC x   Sq Epi: x / Non Sq Epi: x / Bacteria: x      CARDIAC MARKERS ( 20 Dec 2023 12:40 )  x     / x     / 92 U/L / x     / x            Lactate, Blood: 1.2 mmol/L ( @ 17:00)      RADIOLOGY, EKG & ADDITIONAL TESTS: Reviewed.

## 2023-12-20 NOTE — ED PROVIDER NOTE - NSICDXFAMILYHX_GEN_ALL_CORE_FT
FAMILY HISTORY:  Father  Still living? Unknown  Family history of diabetes mellitus, Age at diagnosis: Age Unknown  Family history of hypertension, Age at diagnosis: Age Unknown    Mother  Still living? Unknown  Family history of diabetes mellitus, Age at diagnosis: Age Unknown  Family history of hypertension, Age at diagnosis: Age Unknown    Sibling  Still living? Unknown  Family history of diabetes mellitus, Age at diagnosis: Age Unknown  Family history of hypertension, Age at diagnosis: Age Unknown  Family history of diabetes mellitus, Age at diagnosis: Age Unknown  Family history of hypertension, Age at diagnosis: Age Unknown

## 2023-12-20 NOTE — ED ADULT TRIAGE NOTE - CHIEF COMPLAINT QUOTE
pt brought o ED by kari, pt having 1 week fevers, cough, sob, salas last 2 days noted lump in testicles, denies changes in urination. pt appears SOB, working to maintain satble RR past med hx chf, htn, hld , borderline dm fs= 179

## 2023-12-20 NOTE — ED ADULT NURSE NOTE - NSFALLUNIVINTERV_ED_ALL_ED
Bed/Stretcher in lowest position, wheels locked, appropriate side rails in place/Call bell, personal items and telephone in reach/Instruct patient to call for assistance before getting out of bed/chair/stretcher/Non-slip footwear applied when patient is off stretcher/[place] to call system/Physically safe environment - no spills, clutter or unnecessary equipment/Purposeful proactive rounding/Room/bathroom lighting operational, light cord in reach Bed/Stretcher in lowest position, wheels locked, appropriate side rails in place/Call bell, personal items and telephone in reach/Instruct patient to call for assistance before getting out of bed/chair/stretcher/Non-slip footwear applied when patient is off stretcher/[place] to call system/Physically safe environment - no spills, clutter or unnecessary equipment/Purposeful proactive rounding/Room/bathroom lighting operational, light cord in reach

## 2023-12-20 NOTE — H&P ADULT - NSHPPHYSICALEXAM_GEN_ALL_CORE
VITAL SIGNS:  T(C): 37.4 (12-20-23 @ 21:01), Max: 38.2 (12-20-23 @ 12:54)  T(F): 99.3 (12-20-23 @ 21:01), Max: 100.7 (12-20-23 @ 12:54)  HR: 95 (12-20-23 @ 21:01) (88 - 103)  BP: 118/76 (12-20-23 @ 21:01) (114/67 - 126/81)  BP(mean): --  RR: 26 (12-20-23 @ 21:01) (20 - 26)  SpO2: 100% (12-20-23 @ 21:01) (97% - 100%)  Wt(kg): --    PHYSICAL EXAM:    Constitutional: elderly male, resting comfortably in bed; NAD  Head: NC/AT  Eyes: PERRL, EOMI, anicteric sclera  ENT: no nasal discharge; uvula midline, no oropharyngeal erythema or exudates; MMM  Neck: supple  Respiratory: CTA B/L; no W/R/R, no retractions  Cardiac: +S1/S2; RRR; no M/R/G  Gastrointestinal: abdomen soft, NT/ND; no rebound or guarding; +BSx4  Extremities: WWP, no clubbing or cyanosis; 2+ b/l pitting LE Edema  Musculoskeletal: NROM x4; no joint swelling, tenderness or erythema  Vascular: distal pulses intact  Dermatologic: skin warm, dry and intact; no rashes  Lymphatic: no submandibular or cervical LAD  Neurologic: AAOx3; moves all 4 extremities  Psychiatric: affect and characteristics of appearance, verbalizations, behaviors are appropriate

## 2023-12-20 NOTE — H&P ADULT - TIME BILLING
I have spent a total of greater than 80 minutes time spent to prepare to see the patient, obtaining and reviewing history, physical examination, explaining the diagnosis, prognosis and treatment plan with the patient/family/caregiver. I also have spent the time ordering studies and testing, interpreting results, medicine reconciliation, and documentation as above. Partial Purse String (Intermediate) Text: Given the location of the defect and the characteristics of the surrounding skin an intermediate purse string closure was deemed most appropriate.  Undermining was performed circumfirentially around the surgical defect.  A purse string suture was then placed and tightened. Wound tension only allowed a partial closure of the circular defect.

## 2023-12-20 NOTE — CONSULT NOTE ADULT - ASSESSMENT
Assessment: 83M with multiple medical comorbidities and RIH reduced in ED, not obstructed, no signs of strangulation. Requiring medical optimization of respiratory and fluid status.     Recs:  - Appreciate care per medicine  - will follow   - no acute surgical intervention, but will require hernia repair either as outpatient or this admission pending respiratory improvement  - D/w Dr. Joan Diaz MD, PGY3  B Team Surgery   [id number] Assessment: 83M with multiple medical comorbidities and RIH reduced in ED, not obstructed, no signs of strangulation. Requiring medical optimization of respiratory and fluid status.     Recs:  - Appreciate care per medicine  - will follow   - no acute surgical intervention, but will require hernia repair either as outpatient or this admission pending respiratory improvement  - D/w Dr. Joan Diaz MD, PGY3  B Team Surgery   [id number]

## 2023-12-20 NOTE — ED PROVIDER NOTE - CLINICAL SUMMARY MEDICAL DECISION MAKING FREE TEXT BOX
83 Y M h/o cardiac amyloidosis, presenting with SOB, URI, likely CHF exacerbation 2/2 medication noncompliance c/b viral uri, common bw, ct abd pelvis in setting of R. inguinal hernia with difficulty reducing fully, non-tender.

## 2023-12-21 DIAGNOSIS — K40.90 UNILATERAL INGUINAL HERNIA, WITHOUT OBSTRUCTION OR GANGRENE, NOT SPECIFIED AS RECURRENT: ICD-10-CM

## 2023-12-21 LAB
A1C WITH ESTIMATED AVERAGE GLUCOSE RESULT: 6.3 % — HIGH (ref 4–5.6)
A1C WITH ESTIMATED AVERAGE GLUCOSE RESULT: 6.3 % — HIGH (ref 4–5.6)
ANION GAP SERPL CALC-SCNC: 11 MMOL/L — SIGNIFICANT CHANGE UP (ref 7–14)
ANION GAP SERPL CALC-SCNC: 11 MMOL/L — SIGNIFICANT CHANGE UP (ref 7–14)
BASOPHILS # BLD AUTO: 0.03 K/UL — SIGNIFICANT CHANGE UP (ref 0–0.2)
BASOPHILS # BLD AUTO: 0.03 K/UL — SIGNIFICANT CHANGE UP (ref 0–0.2)
BASOPHILS NFR BLD AUTO: 0.3 % — SIGNIFICANT CHANGE UP (ref 0–2)
BASOPHILS NFR BLD AUTO: 0.3 % — SIGNIFICANT CHANGE UP (ref 0–2)
BUN SERPL-MCNC: 16 MG/DL — SIGNIFICANT CHANGE UP (ref 7–23)
BUN SERPL-MCNC: 16 MG/DL — SIGNIFICANT CHANGE UP (ref 7–23)
CALCIUM SERPL-MCNC: 8.8 MG/DL — SIGNIFICANT CHANGE UP (ref 8.4–10.5)
CALCIUM SERPL-MCNC: 8.8 MG/DL — SIGNIFICANT CHANGE UP (ref 8.4–10.5)
CHLORIDE SERPL-SCNC: 102 MMOL/L — SIGNIFICANT CHANGE UP (ref 98–107)
CHLORIDE SERPL-SCNC: 102 MMOL/L — SIGNIFICANT CHANGE UP (ref 98–107)
CO2 SERPL-SCNC: 24 MMOL/L — SIGNIFICANT CHANGE UP (ref 22–31)
CO2 SERPL-SCNC: 24 MMOL/L — SIGNIFICANT CHANGE UP (ref 22–31)
CREAT SERPL-MCNC: 1.01 MG/DL — SIGNIFICANT CHANGE UP (ref 0.5–1.3)
CREAT SERPL-MCNC: 1.01 MG/DL — SIGNIFICANT CHANGE UP (ref 0.5–1.3)
CULTURE RESULTS: SIGNIFICANT CHANGE UP
CULTURE RESULTS: SIGNIFICANT CHANGE UP
EGFR: 74 ML/MIN/1.73M2 — SIGNIFICANT CHANGE UP
EGFR: 74 ML/MIN/1.73M2 — SIGNIFICANT CHANGE UP
EOSINOPHIL # BLD AUTO: 0.02 K/UL — SIGNIFICANT CHANGE UP (ref 0–0.5)
EOSINOPHIL # BLD AUTO: 0.02 K/UL — SIGNIFICANT CHANGE UP (ref 0–0.5)
EOSINOPHIL NFR BLD AUTO: 0.2 % — SIGNIFICANT CHANGE UP (ref 0–6)
EOSINOPHIL NFR BLD AUTO: 0.2 % — SIGNIFICANT CHANGE UP (ref 0–6)
ESTIMATED AVERAGE GLUCOSE: 134 — SIGNIFICANT CHANGE UP
ESTIMATED AVERAGE GLUCOSE: 134 — SIGNIFICANT CHANGE UP
GLUCOSE BLDC GLUCOMTR-MCNC: 103 MG/DL — HIGH (ref 70–99)
GLUCOSE BLDC GLUCOMTR-MCNC: 103 MG/DL — HIGH (ref 70–99)
GLUCOSE BLDC GLUCOMTR-MCNC: 119 MG/DL — HIGH (ref 70–99)
GLUCOSE BLDC GLUCOMTR-MCNC: 119 MG/DL — HIGH (ref 70–99)
GLUCOSE BLDC GLUCOMTR-MCNC: 143 MG/DL — HIGH (ref 70–99)
GLUCOSE BLDC GLUCOMTR-MCNC: 143 MG/DL — HIGH (ref 70–99)
GLUCOSE BLDC GLUCOMTR-MCNC: 229 MG/DL — HIGH (ref 70–99)
GLUCOSE BLDC GLUCOMTR-MCNC: 229 MG/DL — HIGH (ref 70–99)
GLUCOSE SERPL-MCNC: 114 MG/DL — HIGH (ref 70–99)
GLUCOSE SERPL-MCNC: 114 MG/DL — HIGH (ref 70–99)
HCT VFR BLD CALC: 35 % — LOW (ref 39–50)
HCT VFR BLD CALC: 35 % — LOW (ref 39–50)
HGB BLD-MCNC: 11.5 G/DL — LOW (ref 13–17)
HGB BLD-MCNC: 11.5 G/DL — LOW (ref 13–17)
IANC: 7.5 K/UL — HIGH (ref 1.8–7.4)
IANC: 7.5 K/UL — HIGH (ref 1.8–7.4)
IMM GRANULOCYTES NFR BLD AUTO: 0.5 % — SIGNIFICANT CHANGE UP (ref 0–0.9)
IMM GRANULOCYTES NFR BLD AUTO: 0.5 % — SIGNIFICANT CHANGE UP (ref 0–0.9)
LYMPHOCYTES # BLD AUTO: 0.82 K/UL — LOW (ref 1–3.3)
LYMPHOCYTES # BLD AUTO: 0.82 K/UL — LOW (ref 1–3.3)
LYMPHOCYTES # BLD AUTO: 9 % — LOW (ref 13–44)
LYMPHOCYTES # BLD AUTO: 9 % — LOW (ref 13–44)
MAGNESIUM SERPL-MCNC: 1.9 MG/DL — SIGNIFICANT CHANGE UP (ref 1.6–2.6)
MAGNESIUM SERPL-MCNC: 1.9 MG/DL — SIGNIFICANT CHANGE UP (ref 1.6–2.6)
MCHC RBC-ENTMCNC: 26.6 PG — LOW (ref 27–34)
MCHC RBC-ENTMCNC: 26.6 PG — LOW (ref 27–34)
MCHC RBC-ENTMCNC: 32.9 GM/DL — SIGNIFICANT CHANGE UP (ref 32–36)
MCHC RBC-ENTMCNC: 32.9 GM/DL — SIGNIFICANT CHANGE UP (ref 32–36)
MCV RBC AUTO: 80.8 FL — SIGNIFICANT CHANGE UP (ref 80–100)
MCV RBC AUTO: 80.8 FL — SIGNIFICANT CHANGE UP (ref 80–100)
MONOCYTES # BLD AUTO: 0.7 K/UL — SIGNIFICANT CHANGE UP (ref 0–0.9)
MONOCYTES # BLD AUTO: 0.7 K/UL — SIGNIFICANT CHANGE UP (ref 0–0.9)
MONOCYTES NFR BLD AUTO: 7.7 % — SIGNIFICANT CHANGE UP (ref 2–14)
MONOCYTES NFR BLD AUTO: 7.7 % — SIGNIFICANT CHANGE UP (ref 2–14)
NEUTROPHILS # BLD AUTO: 7.5 K/UL — HIGH (ref 1.8–7.4)
NEUTROPHILS # BLD AUTO: 7.5 K/UL — HIGH (ref 1.8–7.4)
NEUTROPHILS NFR BLD AUTO: 82.3 % — HIGH (ref 43–77)
NEUTROPHILS NFR BLD AUTO: 82.3 % — HIGH (ref 43–77)
NRBC # BLD: 0 /100 WBCS — SIGNIFICANT CHANGE UP (ref 0–0)
NRBC # BLD: 0 /100 WBCS — SIGNIFICANT CHANGE UP (ref 0–0)
NRBC # FLD: 0 K/UL — SIGNIFICANT CHANGE UP (ref 0–0)
NRBC # FLD: 0 K/UL — SIGNIFICANT CHANGE UP (ref 0–0)
PHOSPHATE SERPL-MCNC: 3.4 MG/DL — SIGNIFICANT CHANGE UP (ref 2.5–4.5)
PHOSPHATE SERPL-MCNC: 3.4 MG/DL — SIGNIFICANT CHANGE UP (ref 2.5–4.5)
PLATELET # BLD AUTO: 245 K/UL — SIGNIFICANT CHANGE UP (ref 150–400)
PLATELET # BLD AUTO: 245 K/UL — SIGNIFICANT CHANGE UP (ref 150–400)
POTASSIUM SERPL-MCNC: 4.2 MMOL/L — SIGNIFICANT CHANGE UP (ref 3.5–5.3)
POTASSIUM SERPL-MCNC: 4.2 MMOL/L — SIGNIFICANT CHANGE UP (ref 3.5–5.3)
POTASSIUM SERPL-SCNC: 4.2 MMOL/L — SIGNIFICANT CHANGE UP (ref 3.5–5.3)
POTASSIUM SERPL-SCNC: 4.2 MMOL/L — SIGNIFICANT CHANGE UP (ref 3.5–5.3)
RBC # BLD: 4.33 M/UL — SIGNIFICANT CHANGE UP (ref 4.2–5.8)
RBC # BLD: 4.33 M/UL — SIGNIFICANT CHANGE UP (ref 4.2–5.8)
RBC # FLD: 17.2 % — HIGH (ref 10.3–14.5)
RBC # FLD: 17.2 % — HIGH (ref 10.3–14.5)
SODIUM SERPL-SCNC: 137 MMOL/L — SIGNIFICANT CHANGE UP (ref 135–145)
SODIUM SERPL-SCNC: 137 MMOL/L — SIGNIFICANT CHANGE UP (ref 135–145)
SPECIMEN SOURCE: SIGNIFICANT CHANGE UP
SPECIMEN SOURCE: SIGNIFICANT CHANGE UP
WBC # BLD: 9.12 K/UL — SIGNIFICANT CHANGE UP (ref 3.8–10.5)
WBC # BLD: 9.12 K/UL — SIGNIFICANT CHANGE UP (ref 3.8–10.5)
WBC # FLD AUTO: 9.12 K/UL — SIGNIFICANT CHANGE UP (ref 3.8–10.5)
WBC # FLD AUTO: 9.12 K/UL — SIGNIFICANT CHANGE UP (ref 3.8–10.5)

## 2023-12-21 PROCEDURE — 99223 1ST HOSP IP/OBS HIGH 75: CPT

## 2023-12-21 PROCEDURE — 99233 SBSQ HOSP IP/OBS HIGH 50: CPT

## 2023-12-21 RX ORDER — BUMETANIDE 0.25 MG/ML
1 INJECTION INTRAMUSCULAR; INTRAVENOUS DAILY
Refills: 0 | Status: DISCONTINUED | OUTPATIENT
Start: 2023-12-22 | End: 2023-12-22

## 2023-12-21 RX ORDER — ENOXAPARIN SODIUM 100 MG/ML
40 INJECTION SUBCUTANEOUS EVERY 24 HOURS
Refills: 0 | Status: DISCONTINUED | OUTPATIENT
Start: 2023-12-21 | End: 2023-12-24

## 2023-12-21 RX ORDER — VUTRISIRAN 25 MG/.5ML
25 INJECTION SUBCUTANEOUS
Refills: 0 | DISCHARGE

## 2023-12-21 RX ORDER — SPIRONOLACTONE 25 MG/1
25 TABLET, FILM COATED ORAL DAILY
Refills: 0 | Status: DISCONTINUED | OUTPATIENT
Start: 2023-12-21 | End: 2023-12-24

## 2023-12-21 RX ADMIN — Medication 100 MILLIGRAM(S): at 13:29

## 2023-12-21 RX ADMIN — SPIRONOLACTONE 25 MILLIGRAM(S): 25 TABLET, FILM COATED ORAL at 13:31

## 2023-12-21 RX ADMIN — Medication 200 MILLIGRAM(S): at 21:57

## 2023-12-21 RX ADMIN — Medication 40 MILLIGRAM(S): at 13:31

## 2023-12-21 RX ADMIN — Medication 650 MILLIGRAM(S): at 05:48

## 2023-12-21 RX ADMIN — Medication 650 MILLIGRAM(S): at 22:00

## 2023-12-21 RX ADMIN — PANTOPRAZOLE SODIUM 40 MILLIGRAM(S): 20 TABLET, DELAYED RELEASE ORAL at 05:48

## 2023-12-21 RX ADMIN — Medication 100 MILLIGRAM(S): at 21:06

## 2023-12-21 RX ADMIN — GABAPENTIN 100 MILLIGRAM(S): 400 CAPSULE ORAL at 21:06

## 2023-12-21 RX ADMIN — Medication 40 MILLIGRAM(S): at 05:48

## 2023-12-21 RX ADMIN — Medication 200 MILLIGRAM(S): at 00:22

## 2023-12-21 RX ADMIN — SIMVASTATIN 20 MILLIGRAM(S): 20 TABLET, FILM COATED ORAL at 21:06

## 2023-12-21 RX ADMIN — Medication 650 MILLIGRAM(S): at 21:09

## 2023-12-21 NOTE — PROGRESS NOTE ADULT - SUBJECTIVE AND OBJECTIVE BOX
[facility] Division of Hospital Medicine  Sarmad Townsend MD  Available via MS Teams    SUBJECTIVE / OVERNIGHT EVENTS: No acute events overnight. Pt seen and examined at bedside. Denies any chest pain. Has cough.     ADDITIONAL REVIEW OF SYSTEMS:    MEDICATIONS  (STANDING):  benzonatate 100 milliGRAM(s) Oral every 8 hours  enoxaparin Injectable 40 milliGRAM(s) SubCutaneous every 24 hours  furosemide   Injectable 40 milliGRAM(s) IV Push two times a day  gabapentin 100 milliGRAM(s) Oral at bedtime  pantoprazole    Tablet 40 milliGRAM(s) Oral before breakfast  simvastatin 20 milliGRAM(s) Oral at bedtime  spironolactone 25 milliGRAM(s) Oral daily    MEDICATIONS  (PRN):  acetaminophen     Tablet .. 650 milliGRAM(s) Oral every 6 hours PRN Temp greater or equal to 38C (100.4F), Mild Pain (1 - 3)  guaiFENesin Oral Liquid (Sugar-Free) 200 milliGRAM(s) Oral every 6 hours PRN Cough      I&O's Summary      PHYSICAL EXAM:  Vital Signs Last 24 Hrs  T(C): 36.6 (21 Dec 2023 09:45), Max: 38.3 (21 Dec 2023 05:45)  T(F): 97.9 (21 Dec 2023 09:45), Max: 101 (21 Dec 2023 05:45)  HR: 97 (21 Dec 2023 09:45) (88 - 106)  BP: 105/56 (21 Dec 2023 09:45) (105/56 - 145/94)  RR: 17 (21 Dec 2023 09:45) (16 - 26)  SpO2: 100% (21 Dec 2023 09:45) (98% - 100%)    Parameters below as of 21 Dec 2023 09:45  Patient On (Oxygen Delivery Method): room air      CONSTITUTIONAL: NAD, well appearing  RESPIRATORY: bibasilar crackles, normal respiratory effort  CARDIOVASCULAR: RRR, s1, s2,  1+ b/l pitting edema  ABDOMEN: Nontender to palpation, normoactive bowel sounds, no rebound/guarding. right inguinal hernia  MUSCULOSKELETAL: no clubbing or cyanosis of digits; no joint swelling or tenderness to palpation  PSYCH: A+O to person, place, and time; affect appropriate  NEUROLOGY: CN 2-12 are intact and symmetric; no gross sensory deficits   SKIN: No rashes; no palpable lesions    LABS:                        11.5   9.12  )-----------( 245      ( 21 Dec 2023 05:37 )             35.0     12-21    137  |  102  |  16  ----------------------------<  114<H>  4.2   |  24  |  1.01    Ca    8.8      21 Dec 2023 05:37  Phos  3.4     12-21  Mg     1.90     12-21    TPro  6.6  /  Alb  3.4  /  TBili  0.7  /  DBili  x   /  AST  31  /  ALT  28  /  AlkPhos  107  12-20    PT/INR - ( 20 Dec 2023 12:40 )   PT: 13.2 sec;   INR: 1.18 ratio         PTT - ( 20 Dec 2023 12:40 )  PTT:30.6 sec  CARDIAC MARKERS ( 20 Dec 2023 12:40 )  x     / x     / 92 U/L / x     / x          Urinalysis Basic - ( 21 Dec 2023 05:37 )    Color: x / Appearance: x / SG: x / pH: x  Gluc: 114 mg/dL / Ketone: x  / Bili: x / Urobili: x   Blood: x / Protein: x / Nitrite: x   Leuk Esterase: x / RBC: x / WBC x   Sq Epi: x / Non Sq Epi: x / Bacteria: x        SARS-CoV-2: NotDetec (20 Dec 2023 13:10)        Venous: 12-20-23 @ 12:40 FiO2: -- Oxygen Sat% 27.5      RADIOLOGY & ADDITIONAL TESTS:  New Results Reviewed Today:   New Imaging Personally Reviewed Today:  New Electrocardiogram Personally Reviewed Today:  Prior or Outpatient Records Reviewed Today:    COMMUNICATION:  Care Discussed with Consultants/Other Providers and Details of Discussion: Discussed with ACP  Discussions with Patient/Family:  PCP Communication: [facility] Division of Hospital Medicine  Sarmad Townsend MD  Available via MS Teams    SUBJECTIVE / OVERNIGHT EVENTS: No acute events overnight. Pt seen and examined at bedside. Denies any chest pain. Has cough.     ADDITIONAL REVIEW OF SYSTEMS:    MEDICATIONS  (STANDING):  benzonatate 100 milliGRAM(s) Oral every 8 hours  enoxaparin Injectable 40 milliGRAM(s) SubCutaneous every 24 hours  furosemide   Injectable 40 milliGRAM(s) IV Push two times a day  gabapentin 100 milliGRAM(s) Oral at bedtime  pantoprazole    Tablet 40 milliGRAM(s) Oral before breakfast  simvastatin 20 milliGRAM(s) Oral at bedtime  spironolactone 25 milliGRAM(s) Oral daily    MEDICATIONS  (PRN):  acetaminophen     Tablet .. 650 milliGRAM(s) Oral every 6 hours PRN Temp greater or equal to 38C (100.4F), Mild Pain (1 - 3)  guaiFENesin Oral Liquid (Sugar-Free) 200 milliGRAM(s) Oral every 6 hours PRN Cough      I&O's Summary      PHYSICAL EXAM:  Vital Signs Last 24 Hrs  T(C): 36.6 (21 Dec 2023 09:45), Max: 38.3 (21 Dec 2023 05:45)  T(F): 97.9 (21 Dec 2023 09:45), Max: 101 (21 Dec 2023 05:45)  HR: 97 (21 Dec 2023 09:45) (88 - 106)  BP: 105/56 (21 Dec 2023 09:45) (105/56 - 145/94)  RR: 17 (21 Dec 2023 09:45) (16 - 26)  SpO2: 100% (21 Dec 2023 09:45) (98% - 100%)    Parameters below as of 21 Dec 2023 09:45  Patient On (Oxygen Delivery Method): room air      CONSTITUTIONAL: NAD, well appearing  RESPIRATORY: bibasilar crackles, normal respiratory effort  CARDIOVASCULAR: RRR, s1, s2,  1+ b/l pitting edema  ABDOMEN: Nontender to palpation, normoactive bowel sounds, no rebound/guarding. right inguinal hernia  MUSCULOSKELETAL: no clubbing or cyanosis of digits; no joint swelling or tenderness to palpation  PSYCH: A+O to person, place, and time; affect appropriate  NEUROLOGY: CN 2-12 are intact and symmetric; no gross sensory deficits   SKIN: No rashes; no palpable lesions    LABS:                        11.5   9.12  )-----------( 245      ( 21 Dec 2023 05:37 )             35.0     12-21    137  |  102  |  16  ----------------------------<  114<H>  4.2   |  24  |  1.01    Ca    8.8      21 Dec 2023 05:37  Phos  3.4     12-21  Mg     1.90     12-21    TPro  6.6  /  Alb  3.4  /  TBili  0.7  /  DBili  x   /  AST  31  /  ALT  28  /  AlkPhos  107  12-20    PT/INR - ( 20 Dec 2023 12:40 )   PT: 13.2 sec;   INR: 1.18 ratio         PTT - ( 20 Dec 2023 12:40 )  PTT:30.6 sec  CARDIAC MARKERS ( 20 Dec 2023 12:40 )  x     / x     / 92 U/L / x     / x          Urinalysis Basic - ( 21 Dec 2023 05:37 )    Color: x / Appearance: x / SG: x / pH: x  Gluc: 114 mg/dL / Ketone: x  / Bili: x / Urobili: x   Blood: x / Protein: x / Nitrite: x   Leuk Esterase: x / RBC: x / WBC x   Sq Epi: x / Non Sq Epi: x / Bacteria: x        SARS-CoV-2: NotDetec (20 Dec 2023 13:10)        Venous: 12-20-23 @ 12:40 FiO2: -- Oxygen Sat% 27.5      RADIOLOGY & ADDITIONAL TESTS:  New Results Reviewed Today:   New Imaging Personally Reviewed Today:  New Electrocardiogram Personally Reviewed Today:  Prior or Outpatient Records Reviewed Today:    COMMUNICATION:  Care Discussed with Consultants/Other Providers and Details of Discussion: Discussed with ACP  Discussions with Patient/Family:  PCP Communication:

## 2023-12-21 NOTE — PROGRESS NOTE ADULT - ASSESSMENT
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 Pt found to be entero/rhinovirus positive in [facility]. Pt admitted for further management.

## 2023-12-21 NOTE — CONSULT NOTE ADULT - PROBLEM SELECTOR RECOMMENDATION 9
c/w lasix 40 mg IV q12 today then change to bumex 1 mg PO daily   start elvie 25 mg daily  would be good candidate for SGLT2i eventually

## 2023-12-21 NOTE — PATIENT PROFILE ADULT - ARRIVAL FROM
DIZZINESS    Dizziness is a common problem. It is a feeling of unsteadiness or light-headedness. You may feel like you are about to faint. Dizziness can lead to injury if you stumble or fall. Anyone can become dizzy, but dizziness is more common in older adults. This condition can be caused by a number of things, including medicines, dehydration, or illness.     HOME CARE INSTRUCTIONS  Taking these steps may help with your condition:    Eating and Drinking    Drink enough fluid to keep your urine clear or pale yellow. This helps to keep you from becoming dehydrated. Try to drink more clear fluids, such as water.  Do not drink alcohol.  Limit your caffeine intake if directed by your health care provider.  Limit your salt intake if directed by your health care provider.    Activity    Avoid making quick movements.  Rise slowly from chairs and steady yourself until you feel okay.  In the morning, first sit up on the side of the bed. When you feel okay, stand slowly while you hold onto something until you know that your balance is fine.  Move your legs often if you need to  one place for a long time. Tighten and relax your muscles in your legs while you are standing.  Do not drive or operate heavy machinery if you feel dizzy.  Avoid bending down if you feel dizzy. Place items in your home so that they are easy for you to reach without leaning over.    Lifestyle    Do not use any tobacco products, including cigarettes, chewing tobacco, or electronic cigarettes. If you need help quitting, ask your health care provider.  Try to reduce your stress level, such as with yoga or meditation. Talk with your health care provider if you need help.    General Instructions    Watch your dizziness for any changes.  Take medicines only as directed by your health care provider. Talk with your health care provider if you think that your dizziness is caused by a medicine that you are taking.  Tell a friend or a family member that you are feeling dizzy. If he or she notices any changes in your behavior, have this person call your health care provider.  Keep all follow-up visits as directed by your health care provider. This is important.    SEEK MEDICAL CARE IF:  Your dizziness does not go away.  Your dizziness or light-headedness gets worse.  You feel nauseous.  You have reduced hearing.  You have new symptoms.  You are unsteady on your feet or you feel like the room is spinning.    SEEK IMMEDIATE MEDICAL CARE IF:  You vomit or have diarrhea and are unable to eat or drink anything.  You have problems talking, walking, swallowing, or using your arms, hands, or legs.  You feel generally weak.  You are not thinking clearly or you have trouble forming sentences. It may take a friend or family member to notice this.  You have chest pain, abdominal pain, shortness of breath, or sweating.  Your vision changes.  You notice any bleeding.  You have a headache.  You have neck pain or a stiff neck.  You have a fever.    ADDITIONAL NOTES AND INSTRUCTIONS    Please follow up with your Primary MD in 24-48 hr.  Seek immediate medical care for any new/worsening signs or symptoms.
Home

## 2023-12-21 NOTE — PROGRESS NOTE ADULT - SUBJECTIVE AND OBJECTIVE BOX
SURGERY PROGRESS NOTE    SUBJECTIVE / 24H EVENTS:  Patient seen and examined on morning rounds. No acute events overnight. Patient reports hernia popped back out overnight      OBJECTIVE:  VITAL SIGNS:  T(C): 36.6 (12-21-23 @ 09:45), Max: 38.3 (12-21-23 @ 05:45)  HR: 97 (12-21-23 @ 09:45) (88 - 106)  BP: 105/56 (12-21-23 @ 09:45) (105/56 - 145/94)  RR: 17 (12-21-23 @ 09:45) (16 - 26)  SpO2: 100% (12-21-23 @ 09:45) (98% - 100%)  Daily     Daily   POCT Blood Glucose.: 229 mg/dL (12-21-23 @ 12:47)  POCT Blood Glucose.: 119 mg/dL (12-21-23 @ 08:54)  POCT Blood Glucose.: 103 mg/dL (12-21-23 @ 00:15)      PHYSICAL EXAM:  Gen: NAD  LS: Respirations unlabored on RA  GI: Soft. Nontender. Nondistended.   Groin: Large palpable RIH, reduced at bedside no skin changes         LAB VALUES:  12-21    137  |  102  |  16  ----------------------------<  114<H>  4.2   |  24  |  1.01    Ca    8.8      21 Dec 2023 05:37  Phos  3.4     12-21  Mg     1.90     12-21    TPro  6.6  /  Alb  3.4  /  TBili  0.7  /  DBili  x   /  AST  31  /  ALT  28  /  AlkPhos  107  12-20                               11.5   9.12  )-----------( 245      ( 21 Dec 2023 05:37 )             35.0     LIVER FUNCTIONS - ( 20 Dec 2023 12:40 )  Alb: 3.4 g/dL / Pro: 6.6 g/dL / ALK PHOS: 107 U/L / ALT: 28 U/L / AST: 31 U/L / GGT: x           PT/INR - ( 20 Dec 2023 12:40 )   PT: 13.2 sec;   INR: 1.18 ratio         PTT - ( 20 Dec 2023 12:40 )  PTT:30.6 sec    CARDIAC MARKERS ( 20 Dec 2023 12:40 )  x     / x     / 92 U/L / x     / x          Urinalysis Basic - ( 21 Dec 2023 05:37 )    Color: x / Appearance: x / SG: x / pH: x  Gluc: 114 mg/dL / Ketone: x  / Bili: x / Urobili: x   Blood: x / Protein: x / Nitrite: x   Leuk Esterase: x / RBC: x / WBC x   Sq Epi: x / Non Sq Epi: x / Bacteria: x        MICROBIOLOGY:      RADIOLOGY:  PACS Image: Image(s) Available (12-20-23 @ 16:54)  PACS Image: Image(s) Available (12-20-23 @ 15:57)        MEDICATIONS  (STANDING):  benzonatate 100 milliGRAM(s) Oral every 8 hours  enoxaparin Injectable 40 milliGRAM(s) SubCutaneous every 24 hours  furosemide   Injectable 40 milliGRAM(s) IV Push two times a day  gabapentin 100 milliGRAM(s) Oral at bedtime  pantoprazole    Tablet 40 milliGRAM(s) Oral before breakfast  simvastatin 20 milliGRAM(s) Oral at bedtime  spironolactone 25 milliGRAM(s) Oral daily    MEDICATIONS  (PRN):  acetaminophen     Tablet .. 650 milliGRAM(s) Oral every 6 hours PRN Temp greater or equal to 38C (100.4F), Mild Pain (1 - 3)  guaiFENesin Oral Liquid (Sugar-Free) 200 milliGRAM(s) Oral every 6 hours PRN Cough

## 2023-12-21 NOTE — PATIENT PROFILE ADULT - FALL HARM RISK - HARM RISK INTERVENTIONS
Assistance with ambulation/Assistance OOB with selected safe patient handling equipment/Communicate Risk of Fall with Harm to all staff/Discuss with provider need for PT consult/Monitor gait and stability/Provide patient with walking aids - walker, cane, crutches/Reinforce activity limits and safety measures with patient and family/Tailored Fall Risk Interventions/Visual Cue: Yellow wristband and red socks/Bed in lowest position, wheels locked, appropriate side rails in place/Call bell, personal items and telephone in reach/Instruct patient to call for assistance before getting out of bed or chair/Non-slip footwear when patient is out of bed/[place] to call system/Physically safe environment - no spills, clutter or unnecessary equipment/Purposeful Proactive Rounding/Room/bathroom lighting operational, light cord in reach Assistance with ambulation/Assistance OOB with selected safe patient handling equipment/Communicate Risk of Fall with Harm to all staff/Discuss with provider need for PT consult/Monitor gait and stability/Provide patient with walking aids - walker, cane, crutches/Reinforce activity limits and safety measures with patient and family/Tailored Fall Risk Interventions/Visual Cue: Yellow wristband and red socks/Bed in lowest position, wheels locked, appropriate side rails in place/Call bell, personal items and telephone in reach/Instruct patient to call for assistance before getting out of bed or chair/Non-slip footwear when patient is out of bed/[place] to call system/Physically safe environment - no spills, clutter or unnecessary equipment/Purposeful Proactive Rounding/Room/bathroom lighting operational, light cord in reach

## 2023-12-21 NOTE — CONSULT NOTE ADULT - SUBJECTIVE AND OBJECTIVE BOX
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 Foudn to be + enterovirus with pulmonary edema. Placed on IV lasix 40 mg q12 with good response. Also noted to have inguinal hernia that was seen by surgery and manually reduced as not incarcerated. States coughing is makign hernia worse. Had BM 2 days prior; denies N/V. At baseline, doesn't check weight but reports good response to lasix 40 mg PO daily. Denies sodium indiscretion.     PAST MEDICAL & SURGICAL HISTORY:  Malignant neoplasm of prostate      Meniere disease      Severe pulmonary hypertension      Diabetes mellitus, type 2      Edema of both lower legs      S/P Appendectomy      S/P colonoscopy with polypectomy  2011 - benign      H/O left inguinal hernia repair      Arm fracture, right  during childhood      S/P laparoscopic cholecystectomy  2009      History of brachytherapy          FAMILY HISTORY:  Family history of hypertension (Father, Mother, Sibling, Sibling)    Family history of diabetes mellitus (Father, Mother, Sibling, Sibling)        Home Medications:  Amvuttra 25 mg/0.5 mL subcutaneous solution: 25 milligram(s) subcutaneously (20 Dec 2023 22:20)  furosemide 40 mg oral tablet: 1 tab(s) orally once a day (20 Dec 2023 22:18)  gabapentin 100 mg oral capsule: 1 tab(s) orally once a day (at bedtime) (20 Dec 2023 22:20)  Jardiance 10 mg oral tablet: 1 tab(s) orally once a day (20 Dec 2023 23:32)  metFORMIN 500 mg oral tablet: 1 tab(s) orally 2 times a day (20 Dec 2023 22:19)  omeprazole 20 mg oral delayed release capsule: 1 tab(s) orally once a day (20 Dec 2023 22:20)  simvastatin 20 mg oral tablet: 1 tab(s) orally once a day (at bedtime) (20 Dec 2023 22:20)  Vyndamax 61 mg oral capsule: 1 orally once a day (20 Dec 2023 22:20)      Medications:  acetaminophen     Tablet .. 650 milliGRAM(s) Oral every 6 hours PRN  dextrose 5%. 1000 milliLiter(s) IV Continuous <Continuous>  dextrose 50% Injectable 25 Gram(s) IV Push once  dextrose Oral Gel 15 Gram(s) Oral once PRN  enoxaparin Injectable 40 milliGRAM(s) SubCutaneous every 24 hours  furosemide   Injectable 40 milliGRAM(s) IV Push two times a day  gabapentin 100 milliGRAM(s) Oral at bedtime  glucagon  Injectable 1 milliGRAM(s) IntraMuscular once  guaiFENesin Oral Liquid (Sugar-Free) 200 milliGRAM(s) Oral every 6 hours PRN  insulin lispro (ADMELOG) corrective regimen sliding scale   SubCutaneous three times a day before meals  insulin lispro (ADMELOG) corrective regimen sliding scale   SubCutaneous at bedtime  pantoprazole    Tablet 40 milliGRAM(s) Oral before breakfast  simvastatin 20 milliGRAM(s) Oral at bedtime      Review of systems:  10 point review of systems completed and are negative unless noted in HPI    Vitals:  T(C): 37.3 (12-21-23 @ 07:42), Max: 38.3 (12-21-23 @ 05:45)  HR: 106 (12-21-23 @ 05:45) (88 - 106)  BP: 117/69 (12-21-23 @ 05:45) (114/67 - 145/94)  BP(mean): --  RR: 16 (12-21-23 @ 05:45) (16 - 26)  SpO2: 100% (12-21-23 @ 05:45) (97% - 100%)    Daily     Daily         I&O's Summary      Physical Exam:  Appearance: No Acute Distress  HEENT: PERRL  Neck: JVP approx 10-12 cm   Cardiovascular: Normal S1 S2, No murmurs/rubs/gallops  Respiratory: Clear to auscultation bilaterally  Gastrointestinal: Soft, Non-tender	  Skin: No cyanosis	  Neurologic: Non-focal  Extremities: 1+ LE edema  Psychiatry: A & O x 3, Mood & affect appropriate    Labs:                        11.5   9.12  )-----------( 245      ( 21 Dec 2023 05:37 )             35.0     12-21    137  |  102  |  16  ----------------------------<  114<H>  4.2   |  24  |  1.01    Ca    8.8      21 Dec 2023 05:37  Phos  3.4     12-21  Mg     1.90     12-21    TPro  6.6  /  Alb  3.4  /  TBili  0.7  /  DBili  x   /  AST  31  /  ALT  28  /  AlkPhos  107  12-20    PT/INR - ( 20 Dec 2023 12:40 )   PT: 13.2 sec;   INR: 1.18 ratio         PTT - ( 20 Dec 2023 12:40 )  PTT:30.6 sec  CARDIAC MARKERS ( 20 Dec 2023 12:40 )  x     / x     / 92 U/L / x     / x           Patient is a 83y old  Male who presents with a chief complaint of chf (20 Dec 2023 22:02)    HPI:  Briefly, 82 y/o M w/ h/o DM (A1c 6.3), HTN, prostate Ca, hATTR amyloid ([id number], on Vyndamax; reportedly declined Amvuttra), hernia who presented on 12/20 with 1 week of URI symptoms and worsening SOB and LE edema. Foudn to be + enterovirus with pulmonary edema. Placed on IV lasix 40 mg q12 with good response. Also noted to have inguinal hernia that was seen by surgery and manually reduced as not incarcerated. States coughing is makign hernia worse. Had BM 2 days prior; denies N/V. At baseline, doesn't check weight but reports good response to lasix 40 mg PO daily. Denies sodium indiscretion.     PAST MEDICAL & SURGICAL HISTORY:  Malignant neoplasm of prostate      Meniere disease      Severe pulmonary hypertension      Diabetes mellitus, type 2      Edema of both lower legs      S/P Appendectomy      S/P colonoscopy with polypectomy  2011 - benign      H/O left inguinal hernia repair      Arm fracture, right  during childhood      S/P laparoscopic cholecystectomy  2009      History of brachytherapy          FAMILY HISTORY:  Family history of hypertension (Father, Mother, Sibling, Sibling)    Family history of diabetes mellitus (Father, Mother, Sibling, Sibling)        Home Medications:  Amvuttra 25 mg/0.5 mL subcutaneous solution: 25 milligram(s) subcutaneously (20 Dec 2023 22:20)  furosemide 40 mg oral tablet: 1 tab(s) orally once a day (20 Dec 2023 22:18)  gabapentin 100 mg oral capsule: 1 tab(s) orally once a day (at bedtime) (20 Dec 2023 22:20)  Jardiance 10 mg oral tablet: 1 tab(s) orally once a day (20 Dec 2023 23:32)  metFORMIN 500 mg oral tablet: 1 tab(s) orally 2 times a day (20 Dec 2023 22:19)  omeprazole 20 mg oral delayed release capsule: 1 tab(s) orally once a day (20 Dec 2023 22:20)  simvastatin 20 mg oral tablet: 1 tab(s) orally once a day (at bedtime) (20 Dec 2023 22:20)  Vyndamax 61 mg oral capsule: 1 orally once a day (20 Dec 2023 22:20)      Medications:  acetaminophen     Tablet .. 650 milliGRAM(s) Oral every 6 hours PRN  dextrose 5%. 1000 milliLiter(s) IV Continuous <Continuous>  dextrose 50% Injectable 25 Gram(s) IV Push once  dextrose Oral Gel 15 Gram(s) Oral once PRN  enoxaparin Injectable 40 milliGRAM(s) SubCutaneous every 24 hours  furosemide   Injectable 40 milliGRAM(s) IV Push two times a day  gabapentin 100 milliGRAM(s) Oral at bedtime  glucagon  Injectable 1 milliGRAM(s) IntraMuscular once  guaiFENesin Oral Liquid (Sugar-Free) 200 milliGRAM(s) Oral every 6 hours PRN  insulin lispro (ADMELOG) corrective regimen sliding scale   SubCutaneous three times a day before meals  insulin lispro (ADMELOG) corrective regimen sliding scale   SubCutaneous at bedtime  pantoprazole    Tablet 40 milliGRAM(s) Oral before breakfast  simvastatin 20 milliGRAM(s) Oral at bedtime      Review of systems:  10 point review of systems completed and are negative unless noted in HPI    Vitals:  T(C): 37.3 (12-21-23 @ 07:42), Max: 38.3 (12-21-23 @ 05:45)  HR: 106 (12-21-23 @ 05:45) (88 - 106)  BP: 117/69 (12-21-23 @ 05:45) (114/67 - 145/94)  BP(mean): --  RR: 16 (12-21-23 @ 05:45) (16 - 26)  SpO2: 100% (12-21-23 @ 05:45) (97% - 100%)    Daily     Daily         I&O's Summary      Physical Exam:  Appearance: No Acute Distress  HEENT: PERRL  Neck: JVP approx 10-12 cm   Cardiovascular: Normal S1 S2, No murmurs/rubs/gallops  Respiratory: Clear to auscultation bilaterally  Gastrointestinal: Soft, Non-tender	  Skin: No cyanosis	  Neurologic: Non-focal  Extremities: 1+ LE edema  Psychiatry: A & O x 3, Mood & affect appropriate    Labs:                        11.5   9.12  )-----------( 245      ( 21 Dec 2023 05:37 )             35.0     12-21    137  |  102  |  16  ----------------------------<  114<H>  4.2   |  24  |  1.01    Ca    8.8      21 Dec 2023 05:37  Phos  3.4     12-21  Mg     1.90     12-21    TPro  6.6  /  Alb  3.4  /  TBili  0.7  /  DBili  x   /  AST  31  /  ALT  28  /  AlkPhos  107  12-20    PT/INR - ( 20 Dec 2023 12:40 )   PT: 13.2 sec;   INR: 1.18 ratio         PTT - ( 20 Dec 2023 12:40 )  PTT:30.6 sec  CARDIAC MARKERS ( 20 Dec 2023 12:40 )  x     / x     / 92 U/L / x     / x

## 2023-12-21 NOTE — PHARMACOTHERAPY INTERVENTION NOTE - COMMENTS
Medication list in Outpatient Medication Review ([facility]) updated accordingly; verified with outpatient pharmacy.   Of Note:  - Gabapentin 100 mg (1 cap QHS) prescription last filled in July/2023 x 90 day supply.

## 2023-12-21 NOTE — CONSULT NOTE ADULT - ASSESSMENT
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 Foudn to be + enterovirus/rhinovirus with pulmonary edema. Placed on IV lasix 40 mg q12 with good response. Also noted to have inguinal hernia that was seen by surgery and manually reduced as not incarcerated. States coughing is makign hernia worse but does not appear to be strangulated. Overall appears overloaded in setting of enterovirus.     ECG 11/2/23 - sinus, low voltage, PRWP  TTE 4/6/23 - EF 55%, concentric LVH, septum 1.6/PW 1.6, severe LA dilatation, IVC 1.6 cm, mild-mod TR, LVOT VTI 13 cm Briefly, 84 y/o M w/ h/o DM (A1c 6.3), HTN, prostate Ca, hATTR amyloid ([id number], on Vyndamax; reportedly declined Amvuttra), hernia who presented on 12/20 with 1 week of URI symptoms and worsening SOB and LE edema. Foudn to be + enterovirus/rhinovirus with pulmonary edema. Placed on IV lasix 40 mg q12 with good response. Also noted to have inguinal hernia that was seen by surgery and manually reduced as not incarcerated. States coughing is makign hernia worse but does not appear to be strangulated. Overall appears overloaded in setting of enterovirus.     ECG 11/2/23 - sinus, low voltage, PRWP  TTE 4/6/23 - EF 55%, concentric LVH, septum 1.6/PW 1.6, severe LA dilatation, IVC 1.6 cm, mild-mod TR, LVOT VTI 13 cm

## 2023-12-21 NOTE — PROGRESS NOTE ADULT - PROBLEM SELECTOR PLAN 1
Pt w/ worsening SOB and b/l LE edema. BNP elevated to 5500. S/p IV lasix 40 mg x1 in ED. TTE from 5/2023 w/ LVEF 53% decreased right ventricular systolic function, elevated PASP.  - c/w IV lasix 40 mg BID -> bumex 1 mg daily 12/22   - monitor I/Os  - tele  - TTE  - start spironolactone per HF  - SGLT2-i eventually

## 2023-12-21 NOTE — PROGRESS NOTE ADULT - ASSESSMENT
Assessment: 83M with multiple medical comorbidities and RIH reduced in ED, not obstructed, no signs of strangulation. Requiring medical optimization of respiratory and fluid status.     Recs:  - No contraindication to diet  - Please document medical/cardiac optimization for potential RIH this admission vs. as outpatient   - Care per primary team    B Team Surgery   [id number]     Assessment: 83M with multiple medical comorbidities and RIH reduced in ED, not obstructed, no signs of strangulation. Requiring medical optimization of respiratory and fluid status.     Recs:  - No contraindication to diet  - Please document medical/cardiac optimization for potential RIH this admission vs. as outpatient   - Care per primary team    B Team Surgery   [id number]

## 2023-12-22 ENCOUNTER — TRANSCRIPTION ENCOUNTER (OUTPATIENT)
Age: 83
End: 2023-12-22

## 2023-12-22 LAB
ANION GAP SERPL CALC-SCNC: 11 MMOL/L — SIGNIFICANT CHANGE UP (ref 7–14)
ANION GAP SERPL CALC-SCNC: 11 MMOL/L — SIGNIFICANT CHANGE UP (ref 7–14)
BUN SERPL-MCNC: 19 MG/DL — SIGNIFICANT CHANGE UP (ref 7–23)
BUN SERPL-MCNC: 19 MG/DL — SIGNIFICANT CHANGE UP (ref 7–23)
CALCIUM SERPL-MCNC: 8.2 MG/DL — LOW (ref 8.4–10.5)
CALCIUM SERPL-MCNC: 8.2 MG/DL — LOW (ref 8.4–10.5)
CHLORIDE SERPL-SCNC: 102 MMOL/L — SIGNIFICANT CHANGE UP (ref 98–107)
CHLORIDE SERPL-SCNC: 102 MMOL/L — SIGNIFICANT CHANGE UP (ref 98–107)
CO2 SERPL-SCNC: 25 MMOL/L — SIGNIFICANT CHANGE UP (ref 22–31)
CO2 SERPL-SCNC: 25 MMOL/L — SIGNIFICANT CHANGE UP (ref 22–31)
CREAT SERPL-MCNC: 1.02 MG/DL — SIGNIFICANT CHANGE UP (ref 0.5–1.3)
CREAT SERPL-MCNC: 1.02 MG/DL — SIGNIFICANT CHANGE UP (ref 0.5–1.3)
EGFR: 73 ML/MIN/1.73M2 — SIGNIFICANT CHANGE UP
EGFR: 73 ML/MIN/1.73M2 — SIGNIFICANT CHANGE UP
GLUCOSE SERPL-MCNC: 127 MG/DL — HIGH (ref 70–99)
GLUCOSE SERPL-MCNC: 127 MG/DL — HIGH (ref 70–99)
HCT VFR BLD CALC: 34.9 % — LOW (ref 39–50)
HCT VFR BLD CALC: 34.9 % — LOW (ref 39–50)
HGB BLD-MCNC: 11.5 G/DL — LOW (ref 13–17)
HGB BLD-MCNC: 11.5 G/DL — LOW (ref 13–17)
MCHC RBC-ENTMCNC: 26.7 PG — LOW (ref 27–34)
MCHC RBC-ENTMCNC: 26.7 PG — LOW (ref 27–34)
MCHC RBC-ENTMCNC: 33 GM/DL — SIGNIFICANT CHANGE UP (ref 32–36)
MCHC RBC-ENTMCNC: 33 GM/DL — SIGNIFICANT CHANGE UP (ref 32–36)
MCV RBC AUTO: 81 FL — SIGNIFICANT CHANGE UP (ref 80–100)
MCV RBC AUTO: 81 FL — SIGNIFICANT CHANGE UP (ref 80–100)
NRBC # BLD: 0 /100 WBCS — SIGNIFICANT CHANGE UP (ref 0–0)
NRBC # BLD: 0 /100 WBCS — SIGNIFICANT CHANGE UP (ref 0–0)
NRBC # FLD: 0 K/UL — SIGNIFICANT CHANGE UP (ref 0–0)
NRBC # FLD: 0 K/UL — SIGNIFICANT CHANGE UP (ref 0–0)
PLATELET # BLD AUTO: 264 K/UL — SIGNIFICANT CHANGE UP (ref 150–400)
PLATELET # BLD AUTO: 264 K/UL — SIGNIFICANT CHANGE UP (ref 150–400)
POTASSIUM SERPL-MCNC: 4.1 MMOL/L — SIGNIFICANT CHANGE UP (ref 3.5–5.3)
POTASSIUM SERPL-MCNC: 4.1 MMOL/L — SIGNIFICANT CHANGE UP (ref 3.5–5.3)
POTASSIUM SERPL-SCNC: 4.1 MMOL/L — SIGNIFICANT CHANGE UP (ref 3.5–5.3)
POTASSIUM SERPL-SCNC: 4.1 MMOL/L — SIGNIFICANT CHANGE UP (ref 3.5–5.3)
RBC # BLD: 4.31 M/UL — SIGNIFICANT CHANGE UP (ref 4.2–5.8)
RBC # BLD: 4.31 M/UL — SIGNIFICANT CHANGE UP (ref 4.2–5.8)
RBC # FLD: 17.2 % — HIGH (ref 10.3–14.5)
RBC # FLD: 17.2 % — HIGH (ref 10.3–14.5)
SODIUM SERPL-SCNC: 138 MMOL/L — SIGNIFICANT CHANGE UP (ref 135–145)
SODIUM SERPL-SCNC: 138 MMOL/L — SIGNIFICANT CHANGE UP (ref 135–145)
WBC # BLD: 6.18 K/UL — SIGNIFICANT CHANGE UP (ref 3.8–10.5)
WBC # BLD: 6.18 K/UL — SIGNIFICANT CHANGE UP (ref 3.8–10.5)
WBC # FLD AUTO: 6.18 K/UL — SIGNIFICANT CHANGE UP (ref 3.8–10.5)
WBC # FLD AUTO: 6.18 K/UL — SIGNIFICANT CHANGE UP (ref 3.8–10.5)

## 2023-12-22 PROCEDURE — 99233 SBSQ HOSP IP/OBS HIGH 50: CPT

## 2023-12-22 RX ORDER — BUMETANIDE 0.25 MG/ML
1 INJECTION INTRAMUSCULAR; INTRAVENOUS
Refills: 0 | Status: COMPLETED | OUTPATIENT
Start: 2023-12-23 | End: 2023-12-23

## 2023-12-22 RX ORDER — BUMETANIDE 0.25 MG/ML
1 INJECTION INTRAMUSCULAR; INTRAVENOUS ONCE
Refills: 0 | Status: DISCONTINUED | OUTPATIENT
Start: 2023-12-22 | End: 2023-12-24

## 2023-12-22 RX ORDER — BUMETANIDE 0.25 MG/ML
1 INJECTION INTRAMUSCULAR; INTRAVENOUS DAILY
Refills: 0 | Status: DISCONTINUED | OUTPATIENT
Start: 2023-12-24 | End: 2023-12-24

## 2023-12-22 RX ORDER — LIDOCAINE 4 G/100G
1 CREAM TOPICAL EVERY 24 HOURS
Refills: 0 | Status: DISCONTINUED | OUTPATIENT
Start: 2023-12-22 | End: 2023-12-24

## 2023-12-22 RX ADMIN — Medication 100 MILLIGRAM(S): at 21:19

## 2023-12-22 RX ADMIN — PANTOPRAZOLE SODIUM 40 MILLIGRAM(S): 20 TABLET, DELAYED RELEASE ORAL at 05:16

## 2023-12-22 RX ADMIN — Medication 100 MILLIGRAM(S): at 12:58

## 2023-12-22 RX ADMIN — Medication 100 MILLIGRAM(S): at 05:16

## 2023-12-22 RX ADMIN — ENOXAPARIN SODIUM 40 MILLIGRAM(S): 100 INJECTION SUBCUTANEOUS at 05:16

## 2023-12-22 RX ADMIN — SIMVASTATIN 20 MILLIGRAM(S): 20 TABLET, FILM COATED ORAL at 21:19

## 2023-12-22 RX ADMIN — SPIRONOLACTONE 25 MILLIGRAM(S): 25 TABLET, FILM COATED ORAL at 05:16

## 2023-12-22 RX ADMIN — LIDOCAINE 1 PATCH: 4 CREAM TOPICAL at 16:48

## 2023-12-22 RX ADMIN — GABAPENTIN 100 MILLIGRAM(S): 400 CAPSULE ORAL at 21:19

## 2023-12-22 RX ADMIN — LIDOCAINE 1 PATCH: 4 CREAM TOPICAL at 20:03

## 2023-12-22 RX ADMIN — BUMETANIDE 1 MILLIGRAM(S): 0.25 INJECTION INTRAMUSCULAR; INTRAVENOUS at 05:16

## 2023-12-22 NOTE — DISCHARGE NOTE PROVIDER - NSDCMRMEDTOKEN_GEN_ALL_CORE_FT
furosemide 40 mg oral tablet: 1 tab(s) orally once a day  gabapentin 100 mg oral capsule: 1 tab(s) orally once a day (at bedtime) (Last filled July/2023 x 90 days).  Jardiance 10 mg oral tablet: 1 tab(s) orally once a day  metFORMIN 500 mg oral tablet: 1 tab(s) orally 2 times a day  omeprazole 20 mg oral delayed release capsule: 1 tab(s) orally once a day  simvastatin 20 mg oral tablet: 1 tab(s) orally once a day (at bedtime)  Vyndamax 61 mg oral capsule: 1 cap(s) orally once a day   acetaminophen 325 mg oral tablet: 2 tab(s) orally every 6 hours as needed for  Mild Pain (1 - 3)  bumetanide 1 mg oral tablet: 1 tab(s) orally once a day  gabapentin 100 mg oral capsule: 1 tab(s) orally once a day (at bedtime) (Last filled July/2023 x 90 days).  guaiFENesin 100 mg/5 mL oral liquid: 10 milliliter(s) orally every 6 hours as needed for Cough  Jardiance 10 mg oral tablet: 1 tab(s) orally once a day  metFORMIN 500 mg oral tablet: 1 tab(s) orally 2 times a day  omeprazole 20 mg oral delayed release capsule: 1 tab(s) orally once a day  simvastatin 20 mg oral tablet: 1 tab(s) orally once a day (at bedtime)  spironolactone 25 mg oral tablet: 1 tab(s) orally once a day  Vyndamax 61 mg oral capsule: 1 cap(s) orally once a day

## 2023-12-22 NOTE — PROGRESS NOTE ADULT - SUBJECTIVE AND OBJECTIVE BOX
Soy Montiel MD  [facility] Division of Hospital Medicine  Pager: [phone]  Available via MS Teams    SUBJECTIVE / OVERNIGHT EVENTS:    Has chest pain but only with a cough   Cough still significant  Patient still with dyspnea epe with mild exertion      MEDICATIONS  (STANDING):  benzonatate 100 milliGRAM(s) Oral every 8 hours  buMETAnide 1 milliGRAM(s) Oral daily  enoxaparin Injectable 40 milliGRAM(s) SubCutaneous every 24 hours  gabapentin 100 milliGRAM(s) Oral at bedtime  pantoprazole    Tablet 40 milliGRAM(s) Oral before breakfast  simvastatin 20 milliGRAM(s) Oral at bedtime  spironolactone 25 milliGRAM(s) Oral daily    MEDICATIONS  (PRN):  acetaminophen     Tablet .. 650 milliGRAM(s) Oral every 6 hours PRN Temp greater or equal to 38C (100.4F), Mild Pain (1 - 3)  guaiFENesin Oral Liquid (Sugar-Free) 200 milliGRAM(s) Oral every 6 hours PRN Cough      I&O's Summary      PHYSICAL EXAM:  Vital Signs Last 24 Hrs  T(C): 36.9 (22 Dec 2023 05:00), Max: 37.3 (21 Dec 2023 22:05)  T(F): 98.4 (22 Dec 2023 05:00), Max: 99.1 (21 Dec 2023 22:05)  HR: 83 (22 Dec 2023 05:00) (83 - 98)  BP: 106/62 (22 Dec 2023 05:00) (105/60 - 119/64)  BP(mean): --  RR: 18 (22 Dec 2023 05:00) (17 - 20)  SpO2: 100% (22 Dec 2023 05:00) (99% - 100%)    Parameters below as of 22 Dec 2023 05:00  Patient On (Oxygen Delivery Method): room air      CONSTITUTIONAL: NAD   EYES: conjunctiva and sclera clear  ENMT: Moist oral mucosa   NECK: Supple, JVP at 8 CM   RESPIRATORY: Normal respiratory effort; lungs are clear to auscultation bilaterally  CARDIOVASCULAR: Regular rate and rhythm, normal S1 and S2, no murmur/rub/gallop; Peripheral pulses are 2+ bilaterally  ABDOMEN: Nontender to palpation, normoactive bowel sounds, no rebound/guarding   MUSCULOSKELETAL: No lower extremity edema   PSYCH: A+O to person, place, and time; affect appropriate  NEUROLOGY: moves all extremities   SKIN: No rashes    LABS:                        11.5   6.18  )-----------( 264      ( 22 Dec 2023 05:52 )             34.9     12-22    138  |  102  |  19  ----------------------------<  127<H>  4.1   |  25  |  1.02    Ca    8.2<L>      22 Dec 2023 05:52  Phos  3.4     12-21  Mg     1.90     12-21    TPro  6.6  /  Alb  3.4  /  TBili  0.7  /  DBili  x   /  AST  31  /  ALT  28  /  AlkPhos  107  12-20    PT/INR - ( 20 Dec 2023 12:40 )   PT: 13.2 sec;   INR: 1.18 ratio         PTT - ( 20 Dec 2023 12:40 )  PTT:30.6 sec  CARDIAC MARKERS ( 20 Dec 2023 12:40 )  x     / x     / 92 U/L / x     / x          Urinalysis Basic - ( 22 Dec 2023 05:52 )    Color: x / Appearance: x / SG: x / pH: x  Gluc: 127 mg/dL / Ketone: x  / Bili: x / Urobili: x   Blood: x / Protein: x / Nitrite: x   Leuk Esterase: x / RBC: x / WBC x   Sq Epi: x / Non Sq Epi: x / Bacteria: x        Culture - Urine (collected 20 Dec 2023 18:00)  Source: Clean Catch Clean Catch (Midstream)  Final Report (21 Dec 2023 21:49):    <10,000 CFU/mL Normal Urogenital Missy    Culture - Blood (collected 20 Dec 2023 12:25)  Source: .Blood Blood-Peripheral  Preliminary Report (21 Dec 2023 15:02):    No growth at 24 hours    Culture - Blood (collected 20 Dec 2023 12:10)  Source: .Blood Blood-Peripheral  Preliminary Report (21 Dec 2023 15:02):    No growth at 24 hours      SARS-CoV-2: NotDetec (20 Dec 2023 13:10)      RADIOLOGY & ADDITIONAL TESTS:  Other Results Reviewed Today: BMP with stable Cr, CBC with stable Hg     COORDINATION OF CARE:  Communication: discussed plan of care with ACP  Soy Montiel MD  [facility] Division of [facility] Medicine  Pager: [phone]  Available via MS Teams    SUBJECTIVE / OVERNIGHT EVENTS:    Has chest pain but only with a cough   Cough still significant  Patient still with dyspnea epe with mild exertion      MEDICATIONS  (STANDING):  benzonatate 100 milliGRAM(s) Oral every 8 hours  buMETAnide 1 milliGRAM(s) Oral daily  enoxaparin Injectable 40 milliGRAM(s) SubCutaneous every 24 hours  gabapentin 100 milliGRAM(s) Oral at bedtime  pantoprazole    Tablet 40 milliGRAM(s) Oral before breakfast  simvastatin 20 milliGRAM(s) Oral at bedtime  spironolactone 25 milliGRAM(s) Oral daily    MEDICATIONS  (PRN):  acetaminophen     Tablet .. 650 milliGRAM(s) Oral every 6 hours PRN Temp greater or equal to 38C (100.4F), Mild Pain (1 - 3)  guaiFENesin Oral Liquid (Sugar-Free) 200 milliGRAM(s) Oral every 6 hours PRN Cough      I&O's Summary      PHYSICAL EXAM:  Vital Signs Last 24 Hrs  T(C): 36.9 (22 Dec 2023 05:00), Max: 37.3 (21 Dec 2023 22:05)  T(F): 98.4 (22 Dec 2023 05:00), Max: 99.1 (21 Dec 2023 22:05)  HR: 83 (22 Dec 2023 05:00) (83 - 98)  BP: 106/62 (22 Dec 2023 05:00) (105/60 - 119/64)  BP(mean): --  RR: 18 (22 Dec 2023 05:00) (17 - 20)  SpO2: 100% (22 Dec 2023 05:00) (99% - 100%)    Parameters below as of 22 Dec 2023 05:00  Patient On (Oxygen Delivery Method): room air      CONSTITUTIONAL: NAD   EYES: conjunctiva and sclera clear  ENMT: Moist oral mucosa   NECK: Supple, JVP at 8 CM   RESPIRATORY: Normal respiratory effort; lungs are clear to auscultation bilaterally  CARDIOVASCULAR: Regular rate and rhythm, normal S1 and S2, no murmur/rub/gallop; Peripheral pulses are 2+ bilaterally  ABDOMEN: Nontender to palpation, normoactive bowel sounds, no rebound/guarding   MUSCULOSKELETAL: No lower extremity edema   PSYCH: A+O to person, place, and time; affect appropriate  NEUROLOGY: moves all extremities   SKIN: No rashes    LABS:                        11.5   6.18  )-----------( 264      ( 22 Dec 2023 05:52 )             34.9     12-22    138  |  102  |  19  ----------------------------<  127<H>  4.1   |  25  |  1.02    Ca    8.2<L>      22 Dec 2023 05:52  Phos  3.4     12-21  Mg     1.90     12-21    TPro  6.6  /  Alb  3.4  /  TBili  0.7  /  DBili  x   /  AST  31  /  ALT  28  /  AlkPhos  107  12-20    PT/INR - ( 20 Dec 2023 12:40 )   PT: 13.2 sec;   INR: 1.18 ratio         PTT - ( 20 Dec 2023 12:40 )  PTT:30.6 sec  CARDIAC MARKERS ( 20 Dec 2023 12:40 )  x     / x     / 92 U/L / x     / x          Urinalysis Basic - ( 22 Dec 2023 05:52 )    Color: x / Appearance: x / SG: x / pH: x  Gluc: 127 mg/dL / Ketone: x  / Bili: x / Urobili: x   Blood: x / Protein: x / Nitrite: x   Leuk Esterase: x / RBC: x / WBC x   Sq Epi: x / Non Sq Epi: x / Bacteria: x        Culture - Urine (collected 20 Dec 2023 18:00)  Source: Clean Catch Clean Catch (Midstream)  Final Report (21 Dec 2023 21:49):    <10,000 CFU/mL Normal Urogenital Missy    Culture - Blood (collected 20 Dec 2023 12:25)  Source: .Blood Blood-Peripheral  Preliminary Report (21 Dec 2023 15:02):    No growth at 24 hours    Culture - Blood (collected 20 Dec 2023 12:10)  Source: .Blood Blood-Peripheral  Preliminary Report (21 Dec 2023 15:02):    No growth at 24 hours      SARS-CoV-2: NotDetec (20 Dec 2023 13:10)      RADIOLOGY & ADDITIONAL TESTS:  Other Results Reviewed Today: BMP with stable Cr, CBC with stable Hg     COORDINATION OF CARE:  Communication: discussed plan of care with ACP

## 2023-12-22 NOTE — DISCHARGE NOTE PROVIDER - HOSPITAL COURSE
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 Pt found to be entero/rhinovirus positive in ADHF. 83 Y M h/o T2DM, HTN, prior prostate cancer, Pulmonary HTN, Cardiac amyloidosis presenting with URI symptoms (nasal congestion cough), and increased SOB, lower extremity swelling x1 week. Pt found to be entero/rhinovirus positive and in ADHF     #Acute decompensated heart failure.   - p/w worsening SOB and b/l LE edema.  - BNP elevated to 5500  - TTE from 5/2023 w/ LVEF 53% decreased right ventricular systolic function, elevated PASP.  - diuresed with IV lasix, will now continue bumex 1 mg daily   - start spironolactone 25 mg daily   - needs follow up with HF as OP     #Right inguinal hernia.   - CT a/p showing small bowel obstruction to the level of the a right inguinal hernia with surrounding fluid. Pt seen and evaluated by surgery. inguinal hernia reduced in ED, not obstructed, no signs of strangulation per surgery. Pt currently w/o abd pain, n/v, having regular BMs   - plan for outpatient repair of hernia per surgery    #Cardiac amyloidosis.   - continue home Vyndamax.     #Rhinovirus infection.   - c/w symptomatic tx w/ tylenol, robitussin, Tessalon perle.    #DM (diabetes mellitus).   - On home metformin and januvia  - restart on discharge     Patient medically stable for discharge 83 Y M h/o T2DM, HTN, prior prostate cancer, Pulmonary HTN, Cardiac amyloidosis presenting with URI symptoms (nasal congestion cough), and increased SOB, lower extremity swelling x1 week. Pt found to be entero/rhinovirus positive and in ADHF     #Acute decompensated heart failure.   - p/w worsening SOB and b/l LE edema.  - BNP elevated to 5500  - TTE from 5/2023 w/ LVEF 53% decreased right ventricular systolic function, elevated PASP.  - diuresed with IV lasix, will now continue bumex 1 mg daily   - start spironolactone 25 mg daily   - needs follow up with HF as OP      #Right inguinal hernia.   - CT a/p showing small bowel obstruction to the level of the a right inguinal hernia with surrounding fluid. Pt seen and evaluated by surgery. inguinal hernia reduced in ED, not obstructed, no signs of strangulation per surgery. Pt currently w/o abd pain, n/v, having regular BMs   - plan for outpatient repair of hernia per surgery    #Cardiac amyloidosis.   - continue home Vyndamax.     #Rhinovirus infection.   - c/w symptomatic tx w/ tylenol, robitussin, Tessalon perle.    #DM (diabetes mellitus).   - On home metformin and januvia  - restart on discharge     Patient medically stable for discharge

## 2023-12-22 NOTE — DISCHARGE NOTE PROVIDER - NSDCFUADDAPPT_GEN_ALL_CORE_FT
Followup with your PCP: Dr. Neal sam [phone]> repeat bloodwork in 1-2 weeks    Please call office to make appointment with Surgeon Dr. Gan for outpatient robotic repair of hernia / discuss surgical intervention as outpatient     **Close follow up with heart failure doctor Dr. Charles CALL for appointment, possible discuss starting SGLT2i as an outpatient     You have a Cardiology appt with Dr. Jason Laurent on  2/7/23 at 4:30pm  Followup with your PCP: Dr. Neal sam [phone]> repeat bloodwork in 1-2 weeks    Please call office to make appointment with Surgeon Dr. Gan for outpatient robotic repair of hernia / discuss surgical intervention as outpatient     **Close follow up with heart failure doctor Dr. Charles CALL for appointment, possible discuss starting SGLT2i as an outpatient     You have a Cardiology appt with Dr. Jason Laurent on  2/7/23 at 4:30pm

## 2023-12-22 NOTE — PROGRESS NOTE ADULT - ASSESSMENT
Briefly, 82 y/o M w/ h/o DM (A1c 6.3), HTN, prostate Ca, hATTR amyloid ([id number], on Vyndamax; reportedly declined Amvuttra), hernia who presented on 12/20 with 1 week of URI symptoms and worsening SOB and LE edema. Foudn to be + enterovirus/rhinovirus with pulmonary edema. Placed on IV lasix 40 mg q12 with good response. Also noted to have inguinal hernia that was seen by surgery and manually reduced as not incarcerated. States coughing is makign hernia worse but does not appear to be strangulated. Overall appears overloaded in setting of enterovirus.     ECG 11/2/23 - sinus, low voltage, PRWP  TTE 4/6/23 - EF 55%, concentric LVH, septum 1.6/PW 1.6, severe LA dilatation, IVC 1.6 cm, mild-mod TR, LVOT VTI 13 cm Briefly, 84 y/o M w/ h/o DM (A1c 6.3), HTN, prostate Ca, hATTR amyloid ([id number], on Vyndamax; reportedly declined Amvuttra), hernia who presented on 12/20 with 1 week of URI symptoms and worsening SOB and LE edema. Foudn to be + enterovirus/rhinovirus with pulmonary edema. Placed on IV lasix 40 mg q12 with good response. Also noted to have inguinal hernia that was seen by surgery and manually reduced as not incarcerated. States coughing is makign hernia worse but does not appear to be strangulated. Overall appears overloaded in setting of enterovirus.     ECG 11/2/23 - sinus, low voltage, PRWP  TTE 4/6/23 - EF 55%, concentric LVH, septum 1.6/PW 1.6, severe LA dilatation, IVC 1.6 cm, mild-mod TR, LVOT VTI 13 cm Briefly, 82 y/o M w/ h/o DM (A1c 6.3), HTN, prostate Ca, hATTR amyloid ([id number], on Vyndamax; reportedly declined Amvuttra), hernia who presented on 12/20 with 1 week of URI symptoms and worsening SOB and LE edema. Foudn to be + enterovirus/rhinovirus with pulmonary edema. Placed on IV lasix 40 mg q12 with good response. Also noted to have inguinal hernia that was seen by surgery and manually reduced as not incarcerated. States coughing is makign hernia worse but does not appear to be strangulated. Overall appears overloaded in setting of enterovirus.     ECG 11/2/23 - sinus, low voltage, PRWP  TTE 4/6/23 - EF 55%, concentric LVH, septum 1.6/PW 1.6, severe LA dilatation, IVC 1.6 cm, mild-mod TR, LVOT VTI 13 cm      Bumex 1 mg PO daily   [place] 25 mg daily  SGLT2i as an outpatient   Vyndamax 61mg daily   Appreciate surgical consult (right inguinal hernia IP vs OP)     Briefly, 84 y/o M w/ h/o DM (A1c 6.3), HTN, prostate Ca, hATTR amyloid ([id number], on Vyndamax; reportedly declined Amvuttra), hernia who presented on 12/20 with 1 week of URI symptoms and worsening SOB and LE edema. Foudn to be + enterovirus/rhinovirus with pulmonary edema. Placed on IV lasix 40 mg q12 with good response. Also noted to have inguinal hernia that was seen by surgery and manually reduced as not incarcerated. States coughing is makign hernia worse but does not appear to be strangulated. Overall appears overloaded in setting of enterovirus.     ECG 11/2/23 - sinus, low voltage, PRWP  TTE 4/6/23 - EF 55%, concentric LVH, septum 1.6/PW 1.6, severe LA dilatation, IVC 1.6 cm, mild-mod TR, LVOT VTI 13 cm      Bumex 1 mg PO daily   [place] 25 mg daily  SGLT2i as an outpatient   Vyndamax 61mg daily   Appreciate surgical consult (right inguinal hernia IP vs OP)     Briefly, 82 y/o M w/ h/o DM (A1c 6.3), HTN, prostate Ca, hATTR amyloid ([id number], on Vyndamax; reportedly declined Amvuttra), hernia who presented on 12/20 with 1 week of URI symptoms and worsening SOB and LE edema. Foudn to be + enterovirus/rhinovirus with pulmonary edema. Placed on IV lasix 40 mg q12 with good response. Also noted to have inguinal hernia that was seen by surgery and manually reduced as not incarcerated. States coughing is makign hernia worse but does not appear to be strangulated. Overall appears overloaded in setting of enterovirus.     ECG 11/2/23 - sinus, low voltage, PRWP  TTE 4/6/23 - EF 55%, concentric LVH, septum 1.6/PW 1.6, severe LA dilatation, IVC 1.6 cm, mild-mod TR, LVOT VTI 13 cm       Briefly, 82 y/o M w/ h/o DM (A1c 6.3), HTN, prostate Ca, hATTR amyloid ([id number], on Vyndamax; reportedly declined Amvuttra), hernia who presented on 12/20 with 1 week of URI symptoms and worsening SOB and LE edema. Foudn to be + enterovirus/rhinovirus with pulmonary edema. Placed on IV lasix 40 mg q12 with good response. Also noted to have inguinal hernia that was seen by surgery and manually reduced as not incarcerated. States coughing is makign hernia worse but does not appear to be strangulated. Overall appears overloaded in setting of enterovirus.     ECG 11/2/23 - sinus, low voltage, PRWP  TTE 4/6/23 - EF 55%, concentric LVH, septum 1.6/PW 1.6, severe LA dilatation, IVC 1.6 cm, mild-mod TR, LVOT VTI 13 cm

## 2023-12-22 NOTE — DISCHARGE NOTE PROVIDER - NSDCFUSCHEDAPPT_GEN_ALL_CORE_FT
Jason Laurent  [facility] Physician Count includes the [facility]  CARDIOLOGY [address] [facility]   Scheduled Appointment: 02/07/2024     Jason Laurent  [facility] Physician [facility]  CARDIOLOGY [address] [facility]   Scheduled Appointment: 02/07/2024

## 2023-12-22 NOTE — DISCHARGE NOTE PROVIDER - CARE PROVIDERS DIRECT ADDRESSES
,DirectAddress_Unknown ,DirectAddress_Unknown,[email or web address] ,DirectAddress_Unknown,[email or web address] ,DirectAddress_Unknown,[email or web address],DirectAddress_Unknown,[email or web address] Hospital[email or web address] ,DirectAddress_Unknown,[email or web address],DirectAddress_Unknown,[email or web address]

## 2023-12-22 NOTE — DISCHARGE NOTE PROVIDER - CARE PROVIDER_API CALL
Donita Gan [facility]  Surgery  [address] [place] [address]  Phone: [phone]  Fax: [phone]  Follow Up Time: 1 week   Donita Gan [facility]  Surgery  [address] [place] [address]  Phone: [phone]  Fax: [phone]  Follow Up Time: 1 week   Donita Gan [facility]  [address] [place] [address]  Phone: [phone]  Fax: [phone]  Follow Up Time: 1 week    Kiran Charles  [facility] Card[facility]  [address] [place] [address] - [place] [address]  Phone: [phone]  Fax: [phone]  Follow Up Time:    Donita Gan [facility]  [address] [place] [address]  Phone: [phone]  Fax: [phone]  Follow Up Time: 1 week    Kiran Charles  [facility]  [address] [place] [address] - [place] [address]  Phone: [phone]  Fax: [phone]  Follow Up Time:    Donita Gan [facility]  Surgery  [address] [place] [address]  Phone: [phone]  Fax: [phone]  Follow Up Time: 1 week    Kiran Charles  [facility]  [address] [place] [address] - [place] [address]  Phone: [phone]  Fax: [phone]  Follow Up Time:     Neal Seaman [facility]  Internal Medicine  [address] [place] [address]  Phone: [phone]  Fax: [phone]  Established Patient  Follow Up Time: 1 week    Jason Laurent  Cardiology  [facility] [address] [address]  Phone: [phone]  Fax: [phone]  Established Patient  Scheduled Appointment: 02/07/2024 04:30 PM   Donita Gan [facility]  Surgery  [address] [place] [address]  Phone: [phone]  Fax: [phone]  Follow Up Time: 1 week    Kiran Charles  [facility] Cardio  [address] [place] [address] - [place] [address]  Phone: [phone]  Fax: [phone]  Follow Up Time:     Neal Seaman [place]  Internal Medicine  [address] [place] [address]  Phone: [phone]  Fax: [phone]  Established Patient  Follow Up Time: 1 week    Jason Laurent  Cardiology  [facility] [address] [address]  Phone: [phone]  Fax: [phone]  Established Patient  Scheduled Appointment: 02/07/2024 04:30 PM

## 2023-12-22 NOTE — PROGRESS NOTE ADULT - SUBJECTIVE AND OBJECTIVE BOX
Interval History:  Patient resting comfortably in bed   Denies CP/SOB/palpitations/dizziness  No acute events overnight      Medications:  acetaminophen     Tablet .. 650 milliGRAM(s) Oral every 6 hours PRN  benzonatate 100 milliGRAM(s) Oral every 8 hours  buMETAnide 1 milliGRAM(s) Oral daily  enoxaparin Injectable 40 milliGRAM(s) SubCutaneous every 24 hours  gabapentin 100 milliGRAM(s) Oral at bedtime  guaiFENesin Oral Liquid (Sugar-Free) 200 milliGRAM(s) Oral every 6 hours PRN  pantoprazole    Tablet 40 milliGRAM(s) Oral before breakfast  simvastatin 20 milliGRAM(s) Oral at bedtime  spironolactone 25 milliGRAM(s) Oral daily      Vitals:  T(C): 36.9 (23 @ 05:00), Max: 37.3 (23 @ 22:05)  HR: 83 (23 @ 05:00) (83 - 98)  BP: 106/62 (23 @ 05:00) (105/60 - 119/64)  BP(mean): --  RR: 18 (23 @ 05:00) (17 - 20)  SpO2: 100% (23 @ 05:00) (99% - 100%)    Daily     Daily Weight in k (22 Dec 2023 05:00)    Weight (kg): 64.2 ( @ 18:18)    I&O's Summary      Physical Exam:  Appearance: No Acute Distress  Neck: No JVD  Cardiovascular: Normal S1 S2  Respiratory: Clear to auscultation bilaterally  Gastrointestinal: Soft, Non-tender	  Skin: No cyanosis	  Neurologic: Non-focal  Extremities: No LE edema  Psychiatry: A & O x 3    Labs:                        11.5   6.18  )-----------( 264      ( 22 Dec 2023 05:52 )             34.9         138  |  102  |  19  ----------------------------<  127<H>  4.1   |  25  |  1.02    Ca    8.2<L>      22 Dec 2023 05:52  Phos  3.4       Mg     1.90         TPro  6.6  /  Alb  3.4  /  TBili  0.7  /  DBili  x   /  AST  31  /  ALT  28  /  AlkPhos  107  12-20    PT/INR - ( 20 Dec 2023 12:40 )   PT: 13.2 sec;   INR: 1.18 ratio         PTT - ( 20 Dec 2023 12:40 )  PTT:30.6 sec  CARDIAC MARKERS ( 20 Dec 2023 12:40 )  x     / x     / 92 U/L / x     / x              Echocardiogram:  Transthoracic Echocardiogram (23 @ 08:51)   Dimensions:     Normal Values:  LA:     4.6 cm    2.0 - 4.0 cm  Ao:     2.8 cm    2.0 - 3.8 cm  SEPTUM: 1.5 cm    0.6 - 1.2 cm  PWT:    1.7 cm    0.6 - 1.1 cm  LVIDd:  3.7 cm    3.0 - 5.6 cm  LVIDs:    ---     1.8 - 4.0 cm  Derived Variables:  LVMI: 137 g/m2  RWT: 0.91  Ejection Fraction (Modified Rizzo Rule): 53 %  ------------------------------------------------------------------------  OBSERVATIONS:  Mitral Valve: Normal mitral valve. Minimal mitral  regurgitation.  Aortic Root: Aortic Root: 2.8 cm.  Ascending Aorta: 3.2 cm.  Aortic Valve: Calcified trileaflet aortic valve with normal  opening. Peak transaortic valve gradient equals 15 mm Hg,  mean transaortic valve gradient equals 8 mm Hg. Minimal  aortic regurgitation.  Peak left ventricular outflow tract  gradient equals 2 mm Hg, mean gradient is equal to 1 mm Hg.  Left Atrium: Moderately dilated left atrium.  LA volume  index = 45 cc/m2.  Left Ventricle: Low normal left ventricular systolic  function.  Severe concentric left ventricular hypertrophy.  Normal left ventricular diastolic function.  Right Heart: Normal right atrium. Normal right ventricular  size with decreased right ventricular systolic function.  Normal tricuspid valve. Mild-moderate tricuspid  regurgitation. Normal pulmonic valve. Mild pulmonic  regurgitation.  Pericardium/PleuraNormal pericardium with no pericardial  effusion.  Hemodynamic: Estimated right ventricular systolic pressure  equals 53 mm Hg, assuming right atrial pressure equals 10  mm Hg, consistent with moderate pulmonary hypertension.  ------------------------------------------------------------------------  CONCLUSIONS:  1. Calcified trileaflet aortic valve with normal opening.  2. Moderately dilated left atrium.  LA volume index = 45  cc/m2.  3. Severe concentric left ventricular hypertrophy.  4. Low normal left ventricular systolic function.  5. Normal right ventricular size with decreased right  ventricular systolic function.  6. Estimated pulmonary artery systolic pressure equals 53  mm Hg, assuming right atrial pressure equals 10  mm Hg,  consistent with moderate pulmonary hypertension.  ---------------------------------------------------------------------         Interval History:  Patient resting comfortably in bed   Denies CP/SOB/palpitations/dizziness  No acute events overnight      Medications:  acetaminophen     Tablet .. 650 milliGRAM(s) Oral every 6 hours PRN  benzonatate 100 milliGRAM(s) Oral every 8 hours  buMETAnide 1 milliGRAM(s) Oral daily  enoxaparin Injectable 40 milliGRAM(s) SubCutaneous every 24 hours  gabapentin 100 milliGRAM(s) Oral at bedtime  guaiFENesin Oral Liquid (Sugar-Free) 200 milliGRAM(s) Oral every 6 hours PRN  pantoprazole    Tablet 40 milliGRAM(s) Oral before breakfast  simvastatin 20 milliGRAM(s) Oral at bedtime  spironolactone 25 milliGRAM(s) Oral daily      Vitals:  T(C): 36.9 (23 @ 05:00), Max: 37.3 (23 @ 22:05)  HR: 83 (23 @ 05:00) (83 - 98)  BP: 106/62 (23 @ 05:00) (105/60 - 119/64)  BP(mean): --  RR: 18 (23 @ 05:00) (17 - 20)  SpO2: 100% (23 @ 05:00) (99% - 100%)    Daily     Daily Weight in k (22 Dec 2023 05:00)    Weight (kg): 64.2 ( @ 18:18)    I&O's Summary      Physical Exam:  Appearance: No Acute Distress  Neck: JVP  Cardiovascular: Normal S1 S2  Respiratory: Clear to auscultation bilaterally  Gastrointestinal: Soft, Non-tender	  Skin: No cyanosis	  Neurologic: Non-focal  Extremities: No LE edema  Psychiatry: A & O x 3    Labs:                        11.5   6.18  )-----------( 264      ( 22 Dec 2023 05:52 )             34.9         138  |  102  |  19  ----------------------------<  127<H>  4.1   |  25  |  1.02    Ca    8.2<L>      22 Dec 2023 05:52  Phos  3.4       Mg     1.90         TPro  6.6  /  Alb  3.4  /  TBili  0.7  /  DBili  x   /  AST  31  /  ALT  28  /  AlkPhos  107  12-20    PT/INR - ( 20 Dec 2023 12:40 )   PT: 13.2 sec;   INR: 1.18 ratio         PTT - ( 20 Dec 2023 12:40 )  PTT:30.6 sec  CARDIAC MARKERS ( 20 Dec 2023 12:40 )  x     / x     / 92 U/L / x     / x              Echocardiogram:  Transthoracic Echocardiogram (23 @ 08:51)   Dimensions:     Normal Values:  LA:     4.6 cm    2.0 - 4.0 cm  Ao:     2.8 cm    2.0 - 3.8 cm  SEPTUM: 1.5 cm    0.6 - 1.2 cm  PWT:    1.7 cm    0.6 - 1.1 cm  LVIDd:  3.7 cm    3.0 - 5.6 cm  LVIDs:    ---     1.8 - 4.0 cm  Derived Variables:  LVMI: 137 g/m2  RWT: 0.91  Ejection Fraction (Modified Rizzo Rule): 53 %  ------------------------------------------------------------------------  OBSERVATIONS:  Mitral Valve: Normal mitral valve. Minimal mitral  regurgitation.  Aortic Root: Aortic Root: 2.8 cm.  Ascending Aorta: 3.2 cm.  Aortic Valve: Calcified trileaflet aortic valve with normal  opening. Peak transaortic valve gradient equals 15 mm Hg,  mean transaortic valve gradient equals 8 mm Hg. Minimal  aortic regurgitation.  Peak left ventricular outflow tract  gradient equals 2 mm Hg, mean gradient is equal to 1 mm Hg.  Left Atrium: Moderately dilated left atrium.  LA volume  index = 45 cc/m2.  Left Ventricle: Low normal left ventricular systolic  function.  Severe concentric left ventricular hypertrophy.  Normal left ventricular diastolic function.  Right Heart: Normal right atrium. Normal right ventricular  size with decreased right ventricular systolic function.  Normal tricuspid valve. Mild-moderate tricuspid  regurgitation. Normal pulmonic valve. Mild pulmonic  regurgitation.  Pericardium/PleuraNormal pericardium with no pericardial  effusion.  Hemodynamic: Estimated right ventricular systolic pressure  equals 53 mm Hg, assuming right atrial pressure equals 10  mm Hg, consistent with moderate pulmonary hypertension.  ------------------------------------------------------------------------  CONCLUSIONS:  1. Calcified trileaflet aortic valve with normal opening.  2. Moderately dilated left atrium.  LA volume index = 45  cc/m2.  3. Severe concentric left ventricular hypertrophy.  4. Low normal left ventricular systolic function.  5. Normal right ventricular size with decreased right  ventricular systolic function.  6. Estimated pulmonary artery systolic pressure equals 53  mm Hg, assuming right atrial pressure equals 10  mm Hg,  consistent with moderate pulmonary hypertension.  ---------------------------------------------------------------------         Interval History:  Patient resting comfortably in bed   Denies CP/SOB/palpitations/dizziness  No acute events overnight      Medications:  acetaminophen     Tablet .. 650 milliGRAM(s) Oral every 6 hours PRN  benzonatate 100 milliGRAM(s) Oral every 8 hours  buMETAnide 1 milliGRAM(s) Oral daily  enoxaparin Injectable 40 milliGRAM(s) SubCutaneous every 24 hours  gabapentin 100 milliGRAM(s) Oral at bedtime  guaiFENesin Oral Liquid (Sugar-Free) 200 milliGRAM(s) Oral every 6 hours PRN  pantoprazole    Tablet 40 milliGRAM(s) Oral before breakfast  simvastatin 20 milliGRAM(s) Oral at bedtime  spironolactone 25 milliGRAM(s) Oral daily      Vitals:  T(C): 36.9 (23 @ 05:00), Max: 37.3 (23 @ 22:05)  HR: 83 (23 @ 05:00) (83 - 98)  BP: 106/62 (23 @ 05:00) (105/60 - 119/64)  BP(mean): --  RR: 18 (23 @ 05:00) (17 - 20)  SpO2: 100% (23 @ 05:00) (99% - 100%)    Daily     Daily Weight in k (22 Dec 2023 05:00)    Weight (kg): 64.2 ( @ 18:18)    I&O's Summary      Physical Exam:  Appearance: No Acute Distress  Neck: JVP~10  Cardiovascular: Normal S1 S2  Respiratory: Clear to auscultation bilaterally  Gastrointestinal: Soft, Non-tender	  Skin: No cyanosis	  Neurologic: Non-focal  Extremities: No LE edema  Psychiatry: A & O x 3    Labs:                        11.5   6.18  )-----------( 264      ( 22 Dec 2023 05:52 )             34.9         138  |  102  |  19  ----------------------------<  127<H>  4.1   |  25  |  1.02    Ca    8.2<L>      22 Dec 2023 05:52  Phos  3.4       Mg     1.90     12-21    TPro  6.6  /  Alb  3.4  /  TBili  0.7  /  DBili  x   /  AST  31  /  ALT  28  /  AlkPhos  107  12-20    PT/INR - ( 20 Dec 2023 12:40 )   PT: 13.2 sec;   INR: 1.18 ratio         PTT - ( 20 Dec 2023 12:40 )  PTT:30.6 sec  CARDIAC MARKERS ( 20 Dec 2023 12:40 )  x     / x     / 92 U/L / x     / x              Echocardiogram:  Transthoracic Echocardiogram (23 @ 08:51)   Dimensions:     Normal Values:  LA:     4.6 cm    2.0 - 4.0 cm  Ao:     2.8 cm    2.0 - 3.8 cm  SEPTUM: 1.5 cm    0.6 - 1.2 cm  PWT:    1.7 cm    0.6 - 1.1 cm  LVIDd:  3.7 cm    3.0 - 5.6 cm  LVIDs:    ---     1.8 - 4.0 cm  Derived Variables:  LVMI: 137 g/m2  RWT: 0.91  Ejection Fraction (Modified Rizzo Rule): 53 %  ------------------------------------------------------------------------  OBSERVATIONS:  Mitral Valve: Normal mitral valve. Minimal mitral  regurgitation.  Aortic Root: Aortic Root: 2.8 cm.  Ascending Aorta: 3.2 cm.  Aortic Valve: Calcified trileaflet aortic valve with normal  opening. Peak transaortic valve gradient equals 15 mm Hg,  mean transaortic valve gradient equals 8 mm Hg. Minimal  aortic regurgitation.  Peak left ventricular outflow tract  gradient equals 2 mm Hg, mean gradient is equal to 1 mm Hg.  Left Atrium: Moderately dilated left atrium.  LA volume  index = 45 cc/m2.  Left Ventricle: Low normal left ventricular systolic  function.  Severe concentric left ventricular hypertrophy.  Normal left ventricular diastolic function.  Right Heart: Normal right atrium. Normal right ventricular  size with decreased right ventricular systolic function.  Normal tricuspid valve. Mild-moderate tricuspid  regurgitation. Normal pulmonic valve. Mild pulmonic  regurgitation.  Pericardium/PleuraNormal pericardium with no pericardial  effusion.  Hemodynamic: Estimated right ventricular systolic pressure  equals 53 mm Hg, assuming right atrial pressure equals 10  mm Hg, consistent with moderate pulmonary hypertension.  ------------------------------------------------------------------------  CONCLUSIONS:  1. Calcified trileaflet aortic valve with normal opening.  2. Moderately dilated left atrium.  LA volume index = 45  cc/m2.  3. Severe concentric left ventricular hypertrophy.  4. Low normal left ventricular systolic function.  5. Normal right ventricular size with decreased right  ventricular systolic function.  6. Estimated pulmonary artery systolic pressure equals 53  mm Hg, assuming right atrial pressure equals 10  mm Hg,  consistent with moderate pulmonary hypertension.  ---------------------------------------------------------------------

## 2023-12-22 NOTE — DISCHARGE NOTE PROVIDER - NPI NUMBER (FOR SYS[employer] USE ONLY) :
[[id number]] [[id number]] [[id number]],[[id number]] [[id number]] [[id number]],[[id number]],[[id number]] [[id number]],[[id number]],[[id number]],[[id number]]

## 2023-12-22 NOTE — DISCHARGE NOTE PROVIDER - NSDCCPCAREPLAN_GEN_ALL_CORE_FT
PRINCIPAL DISCHARGE DIAGNOSIS  Diagnosis: Acute decompensated heart failure  Assessment and Plan of Treatment: You presented in heart failure exacerbation likely from a cold. We had our heart failure doctors see you. We gave you strong water pills and your swelling and breathing improved. We ask you to continue taking bumex 1 mg daily and spironolactone 25 mg daily  - please see the heart failure doctors in clinic      SECONDARY DISCHARGE DIAGNOSES  Diagnosis: Right inguinal hernia  Assessment and Plan of Treatment: - we also noted you to have a small bowel obstruction to the level of the a right inguinal hernia. This was reduced by surgery. THey would like to see your in clinic to close the hernia    Diagnosis: Cardiac amyloidosis  Assessment and Plan of Treatment: - continue home Vyndamax.   - see heart failure       Diagnosis: Rhinovirus infection  Assessment and Plan of Treatment: - continue as needed tylenol, robitussin, Tessalon perle.

## 2023-12-22 NOTE — PROGRESS NOTE ADULT - NS ATTEND AMEND GEN_ALL_CORE FT
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 Found to be + enterovirus/rhinovirus with pulmonary edema. Placed on IV lasix 40 mg q12 with good response. Also noted to have inguinal hernia that was seen by surgery and manually reduced as not incarcerated. Hernia recurred and seen by surgery. Feels improved but continues to have cough. On exam, JVP elevated (but improved), RRR, no m/r/g, CTAB, nontender abdomen, no pedal edema. Labs reviewed. Overall appears overloaded in setting of enterovirus.   - Briefly, 82 y/o M w/ h/o DM (A1c 6.3), HTN, prostate Ca, hATTR amyloid ([id number], on Vyndamax; reportedly declined Amvuttra), hernia who presented on 12/20 with 1 week of URI symptoms and worsening SOB and LE edema. Found to be + enterovirus/rhinovirus with pulmonary edema. Placed on IV lasix 40 mg q12 with good response. Also noted to have inguinal hernia that was seen by surgery and manually reduced as not incarcerated. Hernia recurred and seen by surgery. Feels improved but continues to have cough. On exam, JVP elevated (but improved), RRR, no m/r/g, CTAB, nontender abdomen, no pedal edema. Labs reviewed. Overall appears overloaded in setting of enterovirus.   - Briefly, 84 y/o M w/ h/o DM (A1c 6.3), HTN, prostate Ca, hATTR amyloid ([id number], on Vyndamax; reportedly declined Amvuttra), hernia who presented on 12/20 with 1 week of URI symptoms and worsening SOB and LE edema. Found to be + enterovirus/rhinovirus with pulmonary edema. Placed on IV lasix 40 mg q12 with good response. Also noted to have inguinal hernia that was seen by surgery and manually reduced as not incarcerated. Hernia recurred and seen by surgery. Feels improved but continues to have cough. On exam, JVP elevated (but improved), RRR, no m/r/g, CTAB, nontender abdomen, no pedal edema. Labs reviewed. Overall appears overloaded in setting of enterovirus.   - give bumex 1 mg po twice/day for 2 days then 1 mg daily on Sunday  - c/w elvie 25 mg daily  - if there's a plan to do inpatient inguinal hernia surgery, please let us know and we can continue to optomize him and provide clearance; otherwise will do as an outpatient Briefly, 82 y/o M w/ h/o DM (A1c 6.3), HTN, prostate Ca, hATTR amyloid ([id number], on Vyndamax; reportedly declined Amvuttra), hernia who presented on 12/20 with 1 week of URI symptoms and worsening SOB and LE edema. Found to be + enterovirus/rhinovirus with pulmonary edema. Placed on IV lasix 40 mg q12 with good response. Also noted to have inguinal hernia that was seen by surgery and manually reduced as not incarcerated. Hernia recurred and seen by surgery. Feels improved but continues to have cough. On exam, JVP elevated (but improved), RRR, no m/r/g, CTAB, nontender abdomen, no pedal edema. Labs reviewed. Overall appears overloaded in setting of enterovirus.   - give bumex 1 mg po twice/day for 2 days then 1 mg daily on Sunday  - c/w elvie 25 mg daily  - if there's a plan to do inpatient inguinal hernia surgery, please let us know and we can continue to optomize him and provide clearance; otherwise will do as an outpatient

## 2023-12-22 NOTE — PROGRESS NOTE ADULT - PROBLEM SELECTOR PLAN 1
Bumex 1 mg PO daily   Raad 25 mg daily  SGLT2i as an outpatient   Vyndamax 61mg daily   Strict I/O  Daily standing weights   Monitor lytes replete K>4.0 and Mg>2.0  Trend SCr  Appreciate surgical consult (right inguinal hernia/surgical intervention IP vs OP)   Management per primary team  Pending final recommendations from HF attending Bumex 1 mg daily; Please give additional 1mg this PM then start 2mg daily  Raad 25 mg daily  SGLT2i as an outpatient   Vyndamax 61mg daily   Strict I/O  Daily standing weights   Monitor lytes replete K>4.0 and Mg>2.0  Trend SCr  Appreciate surgical consult (right inguinal hernia/surgical intervention OP)   Management per primary team  Pending final recommendations from HF attending

## 2023-12-22 NOTE — DISCHARGE NOTE PROVIDER - DISCHARGE DIET
DASH Diet/Consistent Carbohydrate Diabetic Diets DASH Diet/Low Sodium Diet/Consistent Carbohydrate Diabetic Diets/Other Diet Instructions

## 2023-12-22 NOTE — PROGRESS NOTE ADULT - PROBLEM SELECTOR PLAN 1
- p/w worsening SOB and b/l LE edema.  - BNP elevated to 5500  - TTE from 5/2023 w/ LVEF 53% decreased right ventricular systolic function, elevated PASP.  - diuresed with IV lasix 40 mg BID, now transitioned to bumex 1 mg daily 12/22   - c/w spironolactone 25 mg daily as per HF rec  - monitor I/Os  - tele  - will need OP HF follow up

## 2023-12-22 NOTE — DISCHARGE NOTE PROVIDER - PROVIDER TOKENS
PROVIDER:[TOKEN:[[id number]],FOLLOWUP:[1 week]] PROVIDER:[TOKEN:[[id number]],FOLLOWUP:[1 week]] PROVIDER:[TOKEN:[[id number]],FOLLOWUP:[1 week]],PROVIDER:[TOKEN:[[id number]]] PROVIDER:[TOKEN:[[id number]],FOLLOWUP:[1 week]],PROVIDER:[TOKEN:[[id number]]] PROVIDER:[TOKEN:[[id number]],FOLLOWUP:[1 week]],PROVIDER:[TOKEN:[[id number]]],PROVIDER:[[id number]],FOLLOWUP:[1 week],ESTABLISHEDPATIENT:[T]],PROVIDER:[TOKEN:[[id number]],SCHEDULEDAPPT:[02/07/2024],SCHEDULEDAPPTTIME:[04:30 PM],ESTABLISHEDPATIENT:[T]] PROVIDER:[TOKEN:[[id number]],FOLLOWUP:[1 week]],PROVIDER:[TOKEN:[[id number]]],PROVIDER:[[id number]],FOLLOWUP:[1 week],ESTABLISHEDPATIENT:[T]],PROVIDER:[TOKEN:[[id number]],SCHEDULEDAPPT:[02/07/2024],SCHEDULEDAPPTTIME:[04:30 PM],ESTABLISHEDPATIENT:[T]]

## 2023-12-22 NOTE — PROGRESS NOTE ADULT - ASSESSMENT
Assessment: 83M with multiple medical comorbidities and RIH reduced in ED, not obstructed, no signs of strangulation. Currently admitted for acute decompensated heart failure 2/2 enterorhinovirus infection. Patient still with bowel function     Recs:  - Cough suppressant PRN   - Patient may follow-up with Dr. Donita Gan as outpatient for robotic repair of RIH once optimized. Information added to d/c summary  - Care per primary  - Pleas ere-page surgery as needed    B Team Surgery   [id number]     Assessment: 83M with multiple medical comorbidities and RIH reduced in ED, not obstructed, no signs of strangulation. Currently admitted for acute decompensated heart failure 2/2 enterorhinovirus infection. Patient still with bowel function     Recs:  - Cough suppressant PRN   - Patient may follow-up with Dr. Donita Gan as outpatient for robotic repair of RIH once optimized. Information added to d/c summary  - Care per primary  - Pleas ere-page surgery as needed    B Team Surgery   [id number]

## 2023-12-23 ENCOUNTER — TRANSCRIPTION ENCOUNTER (OUTPATIENT)
Age: 83
End: 2023-12-23

## 2023-12-23 LAB
ANION GAP SERPL CALC-SCNC: 11 MMOL/L — SIGNIFICANT CHANGE UP (ref 7–14)
ANION GAP SERPL CALC-SCNC: 11 MMOL/L — SIGNIFICANT CHANGE UP (ref 7–14)
BUN SERPL-MCNC: 18 MG/DL — SIGNIFICANT CHANGE UP (ref 7–23)
BUN SERPL-MCNC: 18 MG/DL — SIGNIFICANT CHANGE UP (ref 7–23)
CALCIUM SERPL-MCNC: 8.4 MG/DL — SIGNIFICANT CHANGE UP (ref 8.4–10.5)
CALCIUM SERPL-MCNC: 8.4 MG/DL — SIGNIFICANT CHANGE UP (ref 8.4–10.5)
CHLORIDE SERPL-SCNC: 102 MMOL/L — SIGNIFICANT CHANGE UP (ref 98–107)
CHLORIDE SERPL-SCNC: 102 MMOL/L — SIGNIFICANT CHANGE UP (ref 98–107)
CO2 SERPL-SCNC: 23 MMOL/L — SIGNIFICANT CHANGE UP (ref 22–31)
CO2 SERPL-SCNC: 23 MMOL/L — SIGNIFICANT CHANGE UP (ref 22–31)
CREAT SERPL-MCNC: 0.96 MG/DL — SIGNIFICANT CHANGE UP (ref 0.5–1.3)
CREAT SERPL-MCNC: 0.96 MG/DL — SIGNIFICANT CHANGE UP (ref 0.5–1.3)
EGFR: 79 ML/MIN/1.73M2 — SIGNIFICANT CHANGE UP
EGFR: 79 ML/MIN/1.73M2 — SIGNIFICANT CHANGE UP
GLUCOSE SERPL-MCNC: 146 MG/DL — HIGH (ref 70–99)
GLUCOSE SERPL-MCNC: 146 MG/DL — HIGH (ref 70–99)
HCT VFR BLD CALC: 34.4 % — LOW (ref 39–50)
HCT VFR BLD CALC: 34.4 % — LOW (ref 39–50)
HGB BLD-MCNC: 11.4 G/DL — LOW (ref 13–17)
HGB BLD-MCNC: 11.4 G/DL — LOW (ref 13–17)
MCHC RBC-ENTMCNC: 26.9 PG — LOW (ref 27–34)
MCHC RBC-ENTMCNC: 26.9 PG — LOW (ref 27–34)
MCHC RBC-ENTMCNC: 33.1 GM/DL — SIGNIFICANT CHANGE UP (ref 32–36)
MCHC RBC-ENTMCNC: 33.1 GM/DL — SIGNIFICANT CHANGE UP (ref 32–36)
MCV RBC AUTO: 81.1 FL — SIGNIFICANT CHANGE UP (ref 80–100)
MCV RBC AUTO: 81.1 FL — SIGNIFICANT CHANGE UP (ref 80–100)
NRBC # BLD: 0 /100 WBCS — SIGNIFICANT CHANGE UP (ref 0–0)
NRBC # BLD: 0 /100 WBCS — SIGNIFICANT CHANGE UP (ref 0–0)
NRBC # FLD: 0 K/UL — SIGNIFICANT CHANGE UP (ref 0–0)
NRBC # FLD: 0 K/UL — SIGNIFICANT CHANGE UP (ref 0–0)
PLATELET # BLD AUTO: 284 K/UL — SIGNIFICANT CHANGE UP (ref 150–400)
PLATELET # BLD AUTO: 284 K/UL — SIGNIFICANT CHANGE UP (ref 150–400)
POTASSIUM SERPL-MCNC: 4 MMOL/L — SIGNIFICANT CHANGE UP (ref 3.5–5.3)
POTASSIUM SERPL-MCNC: 4 MMOL/L — SIGNIFICANT CHANGE UP (ref 3.5–5.3)
POTASSIUM SERPL-SCNC: 4 MMOL/L — SIGNIFICANT CHANGE UP (ref 3.5–5.3)
POTASSIUM SERPL-SCNC: 4 MMOL/L — SIGNIFICANT CHANGE UP (ref 3.5–5.3)
RBC # BLD: 4.24 M/UL — SIGNIFICANT CHANGE UP (ref 4.2–5.8)
RBC # BLD: 4.24 M/UL — SIGNIFICANT CHANGE UP (ref 4.2–5.8)
RBC # FLD: 17.2 % — HIGH (ref 10.3–14.5)
RBC # FLD: 17.2 % — HIGH (ref 10.3–14.5)
SODIUM SERPL-SCNC: 136 MMOL/L — SIGNIFICANT CHANGE UP (ref 135–145)
SODIUM SERPL-SCNC: 136 MMOL/L — SIGNIFICANT CHANGE UP (ref 135–145)
WBC # BLD: 5.96 K/UL — SIGNIFICANT CHANGE UP (ref 3.8–10.5)
WBC # BLD: 5.96 K/UL — SIGNIFICANT CHANGE UP (ref 3.8–10.5)
WBC # FLD AUTO: 5.96 K/UL — SIGNIFICANT CHANGE UP (ref 3.8–10.5)
WBC # FLD AUTO: 5.96 K/UL — SIGNIFICANT CHANGE UP (ref 3.8–10.5)

## 2023-12-23 PROCEDURE — 99232 SBSQ HOSP IP/OBS MODERATE 35: CPT

## 2023-12-23 RX ADMIN — GABAPENTIN 100 MILLIGRAM(S): 400 CAPSULE ORAL at 21:44

## 2023-12-23 RX ADMIN — LIDOCAINE 1 PATCH: 4 CREAM TOPICAL at 16:25

## 2023-12-23 RX ADMIN — BUMETANIDE 1 MILLIGRAM(S): 0.25 INJECTION INTRAMUSCULAR; INTRAVENOUS at 05:55

## 2023-12-23 RX ADMIN — SIMVASTATIN 20 MILLIGRAM(S): 20 TABLET, FILM COATED ORAL at 21:44

## 2023-12-23 RX ADMIN — Medication 100 MILLIGRAM(S): at 16:24

## 2023-12-23 RX ADMIN — PANTOPRAZOLE SODIUM 40 MILLIGRAM(S): 20 TABLET, DELAYED RELEASE ORAL at 05:54

## 2023-12-23 RX ADMIN — Medication 100 MILLIGRAM(S): at 05:55

## 2023-12-23 RX ADMIN — Medication 100 MILLIGRAM(S): at 21:44

## 2023-12-23 RX ADMIN — SPIRONOLACTONE 25 MILLIGRAM(S): 25 TABLET, FILM COATED ORAL at 05:54

## 2023-12-23 RX ADMIN — LIDOCAINE 1 PATCH: 4 CREAM TOPICAL at 08:04

## 2023-12-23 RX ADMIN — BUMETANIDE 1 MILLIGRAM(S): 0.25 INJECTION INTRAMUSCULAR; INTRAVENOUS at 16:24

## 2023-12-23 RX ADMIN — ENOXAPARIN SODIUM 40 MILLIGRAM(S): 100 INJECTION SUBCUTANEOUS at 05:56

## 2023-12-23 NOTE — PROGRESS NOTE ADULT - PROBLEM SELECTOR PLAN 6
DVT prophylaxis: lovenox  Diet: dash/tlc, cc
DVT prophylaxis: lovenox  Diet: dash/tlc, cc
f/u official med rec. Med rec pharmacist emailed.

## 2023-12-23 NOTE — DISCHARGE NOTE NURSING/CASE MANAGEMENT/SOCIAL WORK - NSDCFUADDAPPT_GEN_ALL_CORE_FT
Followup with your PCP: Dr. Neal sam [phone]> repeat bloodwork in 1-2 weeks    Please call office to make appointment with Surgeon Dr. Gan for outpatient robotic repair of hernia / discuss surgical intervention as outpatient     **Close follow up with heart failure doctor Dr. Charles CALL for appointment, possible discuss starting SGLT2i as an outpatient     You have a Cardiology appt with Dr. Jason Laurent on  2/7/23 at 4:30pm  Followup with your PCP: Dr. Neal sam [phone]> repeat bloodwork in 1-2 weeks    Please call office to make appointment with Surgeon Dr. Gan for outpatient robotic repair of hernia / discuss surgical intervention as outpatient     **Close follow up with heart failure doctor Dr. Charles CALL for appointment, possible discuss starting SGLT2i as an outpatient     You have a Cardiology appt with Dr. Jason Laurent on  2/7/23 at 4:30pm

## 2023-12-23 NOTE — PHYSICAL THERAPY INITIAL EVALUATION ADULT - ADDITIONAL COMMENTS
Pt reports he lives in a home alone 5 steps to negotiate, chair lift inside, uses cane at baseline, owns a walker.    Patients Current SpO2: 99%

## 2023-12-23 NOTE — PROGRESS NOTE ADULT - PROBLEM SELECTOR PLAN 4
c/w symptomatic tx w/ tylenol, robitussin, Tessalon perle

## 2023-12-23 NOTE — PHYSICAL THERAPY INITIAL EVALUATION ADULT - PERTINENT HX OF CURRENT PROBLEM, REHAB EVAL
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 Pt states he been having a productive cough. Reports SOB on exertion. He states he has been compliant with his medications including his lasix, which he takes 40 mg QD. He denies any fever, chills, chest pain, abd pain, n/v/d. He states his last BM was yesterday.

## 2023-12-23 NOTE — PROGRESS NOTE ADULT - PROBLEM SELECTOR PLAN 1
- p/w worsening SOB and b/l LE edema.  - BNP elevated to 5500  - TTE from 5/2023 w/ LVEF 53% decreased right ventricular systolic function, elevated PASP.  - diuresed with IV lasix 40 mg BID, now transitioned to bumex 2 mg BID till 12/24, then restart bumex 1 mg Daily   - c/w spironolactone 25 mg daily as per HF rec  - monitor I/Os  - tele  - will need OP HF follow up

## 2023-12-23 NOTE — PROGRESS NOTE ADULT - NSPROGADDITIONALINFOA_GEN_ALL_CORE
The necessity of the time spent during the encounter on this date of service was due to:   - Ordering, reviewing, and interpreting labs, testing, and imaging  - Independently obtaining a review of systems and performing a physical exam  - Reviewing prior hospitalization and where necessary, outpatient records  - Reviewing consultant recommendations/communicating with consultants  - Counselling and educating patient and family regarding interpretation of aforementioned items and plan of care    Time-based billing (NON-critical care). Total minutes spent: 52
The necessity of the time spent during the encounter on this date of service was due to:   - Ordering, reviewing, and interpreting labs, testing, and imaging  - Independently obtaining a review of systems and performing a physical exam  - Reviewing prior hospitalization and where necessary, outpatient records  - Reviewing consultant recommendations/communicating with consultants  - Counselling and educating patient and family regarding interpretation of aforementioned items and plan of care    Time-based billing (NON-critical care). Total minutes spent: 52
The necessity of the time spent during the encounter on this date of service was due to:   - Ordering, reviewing, and interpreting labs, testing, and imaging  - Independently obtaining a review of systems and performing a physical exam  - Reviewing prior hospitalization and where necessary, outpatient records  - Reviewing consultant recommendations/communicating with consultants  - Counselling and educating patient and family regarding interpretation of aforementioned items and plan of care    Time-based billing (NON-critical care). Total minutes spent: 35

## 2023-12-23 NOTE — DISCHARGE NOTE NURSING/CASE MANAGEMENT/SOCIAL WORK - NSDCPEFALRISK_GEN_ALL_CORE
For information on Fall & Injury Prevention, visit: [email or web address] OR  [email or web address] OR  https://www.cdc.gov/steadi/patient.html For information on Fall & Injury Prevention, visit: [email or web address] OR  [email or web address] OR  https://www.cdc.gov/steadi/patient.html

## 2023-12-23 NOTE — PROGRESS NOTE ADULT - SUBJECTIVE AND OBJECTIVE BOX
Soy Montiel MD  [facility] Division of Hospital Medicine  Pager: [phone]  Available via MS Teams    SUBJECTIVE / OVERNIGHT EVENTS:    Continues to have cough, mild dyspnea with exertion     MEDICATIONS  (STANDING):  benzonatate 100 milliGRAM(s) Oral every 8 hours  buMETAnide 1 milliGRAM(s) Oral two times a day  buMETAnide 1 milliGRAM(s) Oral once  enoxaparin Injectable 40 milliGRAM(s) SubCutaneous every 24 hours  gabapentin 100 milliGRAM(s) Oral at bedtime  lidocaine   4% Patch 1 Patch Transdermal every 24 hours  pantoprazole    Tablet 40 milliGRAM(s) Oral before breakfast  simvastatin 20 milliGRAM(s) Oral at bedtime  spironolactone 25 milliGRAM(s) Oral daily  Vyndamax 61mg tablet 1 Tablet(s) 1 Capsule(s) Oral daily    MEDICATIONS  (PRN):  acetaminophen     Tablet .. 650 milliGRAM(s) Oral every 6 hours PRN Temp greater or equal to 38C (100.4F), Mild Pain (1 - 3)  guaiFENesin Oral Liquid (Sugar-Free) 200 milliGRAM(s) Oral every 6 hours PRN Cough      I&O's Summary    22 Dec 2023 07:01  -  23 Dec 2023 07:00  --------------------------------------------------------  IN: 500 mL / OUT: 750 mL / NET: -250 mL        PHYSICAL EXAM:  Vital Signs Last 24 Hrs  T(C): 36.9 (23 Dec 2023 14:11), Max: 37.4 (22 Dec 2023 21:20)  T(F): 98.5 (23 Dec 2023 14:11), Max: 99.3 (22 Dec 2023 21:20)  HR: 85 (23 Dec 2023 14:11) (84 - 95)  BP: 102/58 (23 Dec 2023 14:11) (102/58 - 111/68)  BP(mean): --  RR: 18 (23 Dec 2023 14:11) (17 - 18)  SpO2: 100% (23 Dec 2023 14:11) (99% - 100%)    Parameters below as of 23 Dec 2023 05:54  Patient On (Oxygen Delivery Method): room air      CONSTITUTIONAL: NAD   EYES: conjunctiva and sclera clear  ENMT: Moist oral mucosa   NECK: Supple   RESPIRATORY: Normal respiratory effort; lungs are clear to auscultation bilaterally  CARDIOVASCULAR: Regular rate and rhythm, normal S1 and S2, no murmur/rub/gallop; Peripheral pulses are 2+ bilaterally  ABDOMEN: Nontender to palpation, normoactive bowel sounds, no rebound/guarding   MUSCULOSKELETAL: No lower extremity edema   PSYCH: A+O to person, place, and time; affect appropriate  NEUROLOGY: moves all extremities   SKIN: No rashes    LABS:                        11.4   5.96  )-----------( 284      ( 23 Dec 2023 06:26 )             34.4     12-23    136  |  102  |  18  ----------------------------<  146<H>  4.0   |  23  |  0.96    Ca    8.4      23 Dec 2023 06:26            Urinalysis Basic - ( 23 Dec 2023 06:26 )    Color: x / Appearance: x / SG: x / pH: x  Gluc: 146 mg/dL / Ketone: x  / Bili: x / Urobili: x   Blood: x / Protein: x / Nitrite: x   Leuk Esterase: x / RBC: x / WBC x   Sq Epi: x / Non Sq Epi: x / Bacteria: x        Culture - Urine (collected 20 Dec 2023 18:00)  Source: Clean Catch Clean Catch (Midstream)  Final Report (21 Dec 2023 21:49):    <10,000 CFU/mL Normal Urogenital Missy      SARS-CoV-2: NotDetec (20 Dec 2023 13:10)      RADIOLOGY & ADDITIONAL TESTS:  Other Results Reviewed Today: BMP with stable Cr, CBC with stable Hg     COORDINATION OF CARE:  Communication: discussed plan of care with ACP  Soy Montiel MD  [facility] Division of Hospital Medicine  Pager: [phone]  Available via MS Teams    SUBJECTIVE / OVERNIGHT EVENTS:    Continues to have cough, mild dyspnea with exertion     MEDICATIONS  (STANDING):  benzonatate 100 milliGRAM(s) Oral every 8 hours  buMETAnide 1 milliGRAM(s) Oral two times a day  buMETAnide 1 milliGRAM(s) Oral once  enoxaparin Injectable 40 milliGRAM(s) SubCutaneous every 24 hours  gabapentin 100 milliGRAM(s) Oral at bedtime  lidocaine   4% Patch 1 Patch Transdermal every 24 hours  pantoprazole    Tablet 40 milliGRAM(s) Oral before breakfast  simvastatin 20 milliGRAM(s) Oral at bedtime  spironolactone 25 milliGRAM(s) Oral daily  Vyndamax 61mg tablet 1 Tablet(s) 1 Capsule(s) Oral daily    MEDICATIONS  (PRN):  acetaminophen     Tablet .. 650 milliGRAM(s) Oral every 6 hours PRN Temp greater or equal to 38C (100.4F), Mild Pain (1 - 3)  guaiFENesin Oral Liquid (Sugar-Free) 200 milliGRAM(s) Oral every 6 hours PRN Cough      I&O's Summary    22 Dec 2023 07:01  -  23 Dec 2023 07:00  --------------------------------------------------------  IN: 500 mL / OUT: 750 mL / NET: -250 mL        PHYSICAL EXAM:  Vital Signs Last 24 Hrs  T(C): 36.9 (23 Dec 2023 14:11), Max: 37.4 (22 Dec 2023 21:20)  T(F): 98.5 (23 Dec 2023 14:11), Max: 99.3 (22 Dec 2023 21:20)  HR: 85 (23 Dec 2023 14:11) (84 - 95)  BP: 102/58 (23 Dec 2023 14:11) (102/58 - 111/68)  BP(mean): --  RR: 18 (23 Dec 2023 14:11) (17 - 18)  SpO2: 100% (23 Dec 2023 14:11) (99% - 100%)    Parameters below as of 23 Dec 2023 05:54  Patient On (Oxygen Delivery Method): room air      CONSTITUTIONAL: NAD   EYES: conjunctiva and sclera clear  ENMT: Moist oral mucosa   NECK: Supple   RESPIRATORY: Normal respiratory effort; lungs are clear to auscultation bilaterally  CARDIOVASCULAR: Regular rate and rhythm, normal S1 and S2, no murmur/rub/gallop; Peripheral pulses are 2+ bilaterally  ABDOMEN: Nontender to palpation, normoactive bowel sounds, no rebound/guarding   MUSCULOSKELETAL: No lower extremity edema   PSYCH: A+O to person, place, and time; affect appropriate  NEUROLOGY: moves all extremities   SKIN: No rashes    LABS:                        11.4   5.96  )-----------( 284      ( 23 Dec 2023 06:26 )             34.4     12-23    136  |  102  |  18  ----------------------------<  146<H>  4.0   |  23  |  0.96    Ca    8.4      23 Dec 2023 06:26            Urinalysis Basic - ( 23 Dec 2023 06:26 )    Color: x / Appearance: x / SG: x / pH: x  Gluc: 146 mg/dL / Ketone: x  / Bili: x / Urobili: x   Blood: x / Protein: x / Nitrite: x   Leuk Esterase: x / RBC: x / WBC x   Sq Epi: x / Non Sq Epi: x / Bacteria: x        Culture - Urine (collected 20 Dec 2023 18:00)  Source: Clean Catch Clean Catch (Midstream)  Final Report (21 Dec 2023 21:49):    <10,000 CFU/mL Normal Urogenital Missy      SARS-CoV-2: NotDetec (20 Dec 2023 13:10)      RADIOLOGY & ADDITIONAL TESTS:  Other Results Reviewed Today: BMP with stable Cr, CBC with stable Hg     COORDINATION OF CARE:  Communication: discussed plan of care with ACP

## 2023-12-23 NOTE — PROGRESS NOTE ADULT - PROBLEM SELECTOR PROBLEM 4
Please let the patient know that results were acceptable
Rhinovirus infection

## 2023-12-23 NOTE — DISCHARGE NOTE NURSING/CASE MANAGEMENT/SOCIAL WORK - PATIENT PORTAL LINK FT
You can access the FollowMyHealth Patient Portal offered by [facility] by registering at the following website: [email or web address]. By joining [employer]’s FollowMyHealth portal, you will also be able to view your health information using other applications (apps) compatible with our system. You can access the FollowMyHealth Patient Portal offered by [facility] by registering at the following website: [email or web address]. By joining [employer]’s FollowMyHealth portal, you will also be able to view your health information using other applications (apps) compatible with our system.

## 2023-12-23 NOTE — PROGRESS NOTE ADULT - PROBLEM SELECTOR PLAN 2
CT a/p showing small bowel obstruction to the level of the a right inguinal hernia with surrounding fluid. Pt seen and evaluated by surgery. inguinal hernia reduced in ED, not obstructed, no signs of strangulation per surgery. Pt currently w/o abd pain, n/v, last BM yesterday.   - plan for outpatient repair of hernia per surgery
CT a/p showing small bowel obstruction to the level of the a right inguinal hernia with surrounding fluid. Pt seen and evaluated by surgery. inguinal hernia reduced in ED, not obstructed, no signs of strangulation per surgery. Pt currently w/o abd pain, n/v, last BM yesterday.   - plan for outpatient repair or inpatient pending improvement in respiratory
CT a/p showing small bowel obstruction to the level of the a right inguinal hernia with surrounding fluid. Pt seen and evaluated by surgery. inguinal hernia reduced in ED, not obstructed, no signs of strangulation per surgery. Pt currently w/o abd pain, n/v, last BM yesterday.   - plan for outpatient repair of hernia per surgery

## 2023-12-23 NOTE — PROGRESS NOTE ADULT - PROBLEM SELECTOR PLAN 3
Pt on home Vyndamax.   - f/u w/ pt's family in regards to bringing home medication in
Pt on home Vyndamax.   - f/u w/ pt's family in regards to bringing home medication   - daughter will bring it in today
Pt on home Vyndamax.   - continue home med

## 2023-12-23 NOTE — DISCHARGE NOTE NURSING/CASE MANAGEMENT/SOCIAL WORK - NSPROMEDSBROUGHTTOHOSP_GEN_A_NUR
[facility] Psychiatric Progress Note     Assessment     Mr. 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 This is the patient's 3rd hospitalization in the past roughly two months with substance use complicating his course.     The patient's catatonia and depression have been challenging to treat. His depression improved a little with increasing Wellbutrin and possibly from scheduling Zyprexa. His catatonia has improved some with Ativan, Amantadine, and recently Namenda, although he continues to have ambitendency and withdrawal at times. His Ativan was tapered down due to side effects (sedation) and also in preparation to potentially discharge given his history of misuse of substances. The amount had to be increased to 1.5 mg TID given that catatonia emerged more with the patient refusing his medications without reason and then taking (further sign of ambitendency in decision making). He has irrational belief that stopping his medications will lead to discharge. When he refuses medications his withdrawal worsens leading to poor intake with I/O monitoring when needed. Utilizing behavioral approaches to help with medication adherence. Offered ECT as an option to address his depression and catatonia with him pursuing. Monitoring to see if Patterson-Hagen should be pursued.    Patient has SPMI with him having deficits in his occupational and social functioning. He has had significant CNS insults with him having multiple TBIs, significant substance abuse over years, and near fatal cardiac arrest from overdose (? Anoxic injury) that have likely led to neuropsychiatric impact making it more challenging for him to recover. His  and  note he has not been  the same since his near fatal overdose. Patient did score a 4.4/5.8 on the ACL, which indicates the patient would need some level of support and could not be alone the whole day safely, needing assistance with such stuff as solving problems, removing any safety hazards. The patient is not safe to discharge to live on his own with him likely benefiting the most from supportive housing eventually like a group home with possibly even a legal guardian to manage financial and health concerns.    Today: Patient's depression appears to be worsening with removal of Zyprexa. He is not interested in another neuroleptic and is showing minimal signs of psychosis (talked to self recently briefly and yells at times, otherwise denies symptoms). Will increase Wellbutrin to address, being aware of increased risk of seizure. Will continue to advise re-initiation of a neuroleptic. Patient did have some improvement with Wellbutrin increase prior, hence this decision. He has been eating and drinking lately. Patient declined his medications again this morning as does occur periodically.    Educated regarding medication indications, risks, benefits, side effects, contraindications and possible interactions. Verbally expressed understanding.      Diagnoses     1. Schizoaffective disorder, depressive type, multiple episodes, currently in acute episode, with catatonia   2. 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 Methamphetamine use disorder, severe  4. Alcohol use disorder, moderate     Plan     Unit 5  Legal Status: Committed, considering Sarah     Safety Assessment:    Behavioral Orders   Procedures     Code 1 - Restrict to Unit     Routine Programming     As clinically indicated     Status 15     Every 15 minutes.      Medications:     Outpatient medications continued/changed:     Wellbutrin  mg daily -> 300 mg on 12/24 -> 450 mg on 2/14  Lithium 900 mg at bedtime  Lyrica 100 mg TID (initially  held but resumed due to benefit for anxiety and pain)  Vitamin D 50,000 international unit(s) weekly    New medications initiated:     Ativan 2 mg QID -> 1.5 mg QID on 1/5 -> 1/2/2 mg on 1/12 -> 1/1/2 mg on 1/15 -> 1 mg TID on 1/17 -> 1.5 mg TID on 1/26  Namenda 5 mg daily -> 5 mg BID on 1/25 -> 10 mg BID on 1/31  Standard unit PRNs    New medications stopped    Exelon 3 mg BID off label for cognition from TBI due to concerns for agitation with plans to possibly switch to Namenda   Amantadine 100 mg BID on 2/9 due to limited efficacy for his catatonia   Zyprexa, prn upon admission, scheduled 7.5 up to 12.5 mg at bedtime due to patient preference    Programming: Patient will be treated in a therapeutic milieu with appropriate individual and group therapies. Education will be provided on diagnoses, medications, and treatments.     Medical diagnoses:      #. Severe malnutrition  - Nutrition following   - Gaining weight   - Ensure on meal trays   - Monitoring, intermittent IOs    #. Intention tremor bilaterally   - Secondary to lithium  - Monitor  - Conservative management    #. Chronic low back pain  #. Muscle spasms   - Robaxin 1,000 mg TID prn. Stopped on 2/2  - Sulindac 150 mg BID for temporary use  - Lidocaine patches prn  - Icy-hot prn   - APAP prn, increased to 1,000 mg   - Recommend referral to PT and PMR on outpatient basis     #. Multiple TBIs  - MRI findings consistent with encephalomalacia in left temporal-occipital region and left anterior frontal lobe.  - Recommend neuropsychological testing as outpatient and possible TBI focused IRTS/[facility] facility.  - OT to further evaluate    Consult: Nutrition  Labs: Li 0.8 on 1/12  Imaging: MRI recently    Anticipated LOS: 2- 6 weeks  Dispo: Given delays in [facility] likely IRTS versus home with ACT or home care.     Interim History     The patient notes that he feels similar today as yesterday. Refused medications this morning (typical random behavior for patient) due to  "feeling like they don't help. Reiterated the importance and how current state of worsening depression likely reflects stopping Zyprexa. Patient is willing to continue taking medications.    He denies any AVH. He denies feeling sad. He did receive a letter from his mother and was tearful. Did not want to discuss.    He denies any acute physical concerns.     Medications       buPROPion  450 mg Oral Daily     lithium ER  900 mg Oral At Bedtime     LORazepam  1.5 mg Oral TID     memantine  10 mg Oral BID     pregabalin  100 mg Oral TID     sulindac  150 mg Oral BID     vitamin D2  50,000 Units Oral Q7 Days        Allergies     Allergies   Allergen Reactions     Pork Allergy         Psychiatric Examination     /88   Pulse 81   Temp 98.2  F (36.8  C) (Temporal)   Resp 16   Ht 1.829 m (6')   Wt 97.3 kg (214 lb 6.4 oz)   SpO2 98%   BMI 29.08 kg/m    Weight is 214 lbs 6.4 oz  Body mass index is 29.08 kg/m .    Appearance: Alert, oriented, dressed in hospital scrubs, long beard  Attitude: Cooperative   Eye Contact: Fair  Mood: \"Fine\"  Affect: Flat, mood congruent  Speech: Regular rate, soft tone. Normal rhythm   Psychomotor Behavior: No tremor, rigidity, akathisia, or psychomotor retardation. Ambitendency and immobility at times  Thought Process: Linear, concrete, irrational at times   Associations: No loose associations   Thought Content: Denies SI. No SIB. AH and paranoia at times, improved, none recently. Poverty of thought.   Insight: Limited  Judgment: Limited  Oriented to: Person, place, and time  Attention Span and Concentration: Intact  Recent and Remote Memory: Intact  Language: English with appropriate syntax and vocabulary  Fund of Knowledge: Average  Muscle Strength and Tone: Grossly normal  Gait and Station: Grossly normal    [place] Catatonia Rating Scale   Severity Score (Number of points for items 1 -23) _______1___   Screening Score (Presence or absence of items 1 - 14) ______1_____   Time: " 11:00 AM on 1/11/2022   Rater: Dr. Shanelle Schuler Catatonia Rating Scale   Severity Score (Number of points for items 1 -23) ____4______   Screening Score (Presence or absence of items 1 - 14) ___1________   Time: 1145AM on 1/18  Rater: Dr. Shanelle Schuler Catatonia Rating Scale   Severity Score (Number of points for items 1 -23) ____7______   Screening Score (Presence or absence of items 1 - 14) ____4_______   Time: 500 PM on 1/26  Rater: Dr. Shanelle Schuler Catatonia Rating Scale   Severity Score (Number of points for items 1 -23) ___5_______   Screening Score (Presence or absence of items 1 - 14) ____1_______   Time: 121 PM on 2/10  Rater: Dr. Timmons     Labs     No results found for this or any previous visit (from the past 24 hour(s)).       Treatment Team Care Plan     BEHAVIORAL TEAM DISCUSSION    Progress: Continued symptoms with some improvement    Continued Stay Criteria/Rationale: Safe discharge planning    Medical/Physical: See above    Precautions: See above.     Plan: Continue inpatient care with unit support and medication management    Rationale for change in precautions or plan: NA due to no change    Participants: David Timmons DO, Nursing, SW, OT    The patient's care was discussed with the treatment team and chart notes were reviewed.    Attestation      Patient has been seen and evaluated by:    David Timmons DO, MA  Psychiatrist    VIDEO VISIT    Patient has given verbal consent for video visit?: Yes     Video- Visit Details  Type of service:  video visit for mental health treatment.  Time of service:    Date:  02/15/2022    Video Start Time: 1030 AM      Video End Time: 1045 AM    Reason for video visit: COVID-19 [place]  Originating Site (patient location):  [facility]  Distant Site (provider location):  Remote location  Mode of Communication:  Video Conference via [email or web address]                                      no

## 2023-12-23 NOTE — PROGRESS NOTE ADULT - PROBLEM SELECTOR PLAN 5
On home metformin and januvia  A1c at goal, FS controlled   No need for further FS

## 2023-12-24 VITALS
DIASTOLIC BLOOD PRESSURE: 60 MMHG | HEART RATE: 91 BPM | OXYGEN SATURATION: 100 % | RESPIRATION RATE: 18 BRPM | TEMPERATURE: 98 F | SYSTOLIC BLOOD PRESSURE: 113 MMHG

## 2023-12-24 LAB
ANION GAP SERPL CALC-SCNC: 12 MMOL/L — SIGNIFICANT CHANGE UP (ref 7–14)
ANION GAP SERPL CALC-SCNC: 12 MMOL/L — SIGNIFICANT CHANGE UP (ref 7–14)
BUN SERPL-MCNC: 24 MG/DL — HIGH (ref 7–23)
BUN SERPL-MCNC: 24 MG/DL — HIGH (ref 7–23)
CALCIUM SERPL-MCNC: 8.4 MG/DL — SIGNIFICANT CHANGE UP (ref 8.4–10.5)
CALCIUM SERPL-MCNC: 8.4 MG/DL — SIGNIFICANT CHANGE UP (ref 8.4–10.5)
CHLORIDE SERPL-SCNC: 100 MMOL/L — SIGNIFICANT CHANGE UP (ref 98–107)
CHLORIDE SERPL-SCNC: 100 MMOL/L — SIGNIFICANT CHANGE UP (ref 98–107)
CO2 SERPL-SCNC: 26 MMOL/L — SIGNIFICANT CHANGE UP (ref 22–31)
CO2 SERPL-SCNC: 26 MMOL/L — SIGNIFICANT CHANGE UP (ref 22–31)
CREAT SERPL-MCNC: 1.01 MG/DL — SIGNIFICANT CHANGE UP (ref 0.5–1.3)
CREAT SERPL-MCNC: 1.01 MG/DL — SIGNIFICANT CHANGE UP (ref 0.5–1.3)
EGFR: 74 ML/MIN/1.73M2 — SIGNIFICANT CHANGE UP
EGFR: 74 ML/MIN/1.73M2 — SIGNIFICANT CHANGE UP
GLUCOSE SERPL-MCNC: 146 MG/DL — HIGH (ref 70–99)
GLUCOSE SERPL-MCNC: 146 MG/DL — HIGH (ref 70–99)
POTASSIUM SERPL-MCNC: 4 MMOL/L — SIGNIFICANT CHANGE UP (ref 3.5–5.3)
POTASSIUM SERPL-MCNC: 4 MMOL/L — SIGNIFICANT CHANGE UP (ref 3.5–5.3)
POTASSIUM SERPL-SCNC: 4 MMOL/L — SIGNIFICANT CHANGE UP (ref 3.5–5.3)
POTASSIUM SERPL-SCNC: 4 MMOL/L — SIGNIFICANT CHANGE UP (ref 3.5–5.3)
SODIUM SERPL-SCNC: 138 MMOL/L — SIGNIFICANT CHANGE UP (ref 135–145)
SODIUM SERPL-SCNC: 138 MMOL/L — SIGNIFICANT CHANGE UP (ref 135–145)

## 2023-12-24 PROCEDURE — 99239 HOSP IP/OBS DSCHRG MGMT >30: CPT

## 2023-12-24 RX ORDER — SPIRONOLACTONE 25 MG/1
1 TABLET, FILM COATED ORAL
Qty: 30 | Refills: 0
Start: 2023-12-24 | End: 2024-01-22

## 2023-12-24 RX ORDER — ACETAMINOPHEN 500 MG
2 TABLET ORAL
Qty: 0 | Refills: 0 | DISCHARGE
Start: 2023-12-24

## 2023-12-24 RX ORDER — FUROSEMIDE 40 MG
1 TABLET ORAL
Refills: 0 | DISCHARGE

## 2023-12-24 RX ORDER — BUMETANIDE 0.25 MG/ML
1 INJECTION INTRAMUSCULAR; INTRAVENOUS
Qty: 30 | Refills: 0
Start: 2023-12-24 | End: 2024-01-22

## 2023-12-24 RX ADMIN — BUMETANIDE 1 MILLIGRAM(S): 0.25 INJECTION INTRAMUSCULAR; INTRAVENOUS at 06:16

## 2023-12-24 RX ADMIN — Medication 100 MILLIGRAM(S): at 06:15

## 2023-12-24 RX ADMIN — PANTOPRAZOLE SODIUM 40 MILLIGRAM(S): 20 TABLET, DELAYED RELEASE ORAL at 06:15

## 2023-12-24 RX ADMIN — SPIRONOLACTONE 25 MILLIGRAM(S): 25 TABLET, FILM COATED ORAL at 06:15

## 2023-12-24 RX ADMIN — ENOXAPARIN SODIUM 40 MILLIGRAM(S): 100 INJECTION SUBCUTANEOUS at 06:16

## 2023-12-24 NOTE — CHART NOTE - NSCHARTNOTEFT_GEN_A_CORE
Vital Signs Last 24 Hrs  T(C): 36.9 (24 Dec 2023 05:00), Max: 37.1 (23 Dec 2023 20:48)  T(F): 98.4 (24 Dec 2023 05:00), Max: 98.7 (23 Dec 2023 20:48)  HR: 91 (24 Dec 2023 05:00) (87 - 91)  BP: 113/60 (24 Dec 2023 05:00) (113/60 - 116/57)  BP(mean): --  RR: 18 (24 Dec 2023 05:00) (18 - 18)  SpO2: 100% (24 Dec 2023 05:00) (100% - 100%)    Parameters below as of 23 Dec 2023 20:48  Patient On (Oxygen Delivery Method): room air                          11.4   5.96  )-----------( 284      ( 23 Dec 2023 06:26 )             34.4   12-24    138  |  100  |  24<H>  ----------------------------<  146<H>  4.0   |  26  |  1.01    Ca    8.4      24 Dec 2023 07:10      Results and case was discussed with Dr. Montiel, patient is medically cleared and optimized for discharge home today, messaged heart failure fellow Dr. Galindo, with no response.   As per attending patient cleared for discharge, all medications were reviewed with attending, and sent to mutually agreed upon pharmacy >no lasix, continue tylenol/robitussin prn, continue current bumex/aldactone, continue simvastatin, resume home Omeprazole/Metformin/Jardiance/Vyndamax.   Discharge instructions discussed by writer with patient and patient's xander Merida at bedside, informed of importance of Hf, PCP and surgery followups and to contact office, both verbalized understanding, patient states he has all home meds and does not need refills. Sent bumex/aldactone to [facility] pharmacy, attempted to call multiple times with no response, daughter made aware to contact pharmacy tomorrow morning to pickup medications, JEAN PAUL Merrill instructed to provide tomorrow's dose of bumex/aldactone to patient to take home. Home care setup as per [facility]. Vital Signs Last 24 Hrs  T(C): 36.9 (24 Dec 2023 05:00), Max: 37.1 (23 Dec 2023 20:48)  T(F): 98.4 (24 Dec 2023 05:00), Max: 98.7 (23 Dec 2023 20:48)  HR: 91 (24 Dec 2023 05:00) (87 - 91)  BP: 113/60 (24 Dec 2023 05:00) (113/60 - 116/57)  BP(mean): --  RR: 18 (24 Dec 2023 05:00) (18 - 18)  SpO2: 100% (24 Dec 2023 05:00) (100% - 100%)    Parameters below as of 23 Dec 2023 20:48  Patient On (Oxygen Delivery Method): room air                          11.4   5.96  )-----------( 284      ( 23 Dec 2023 06:26 )             34.4   12-24    138  |  100  |  24<H>  ----------------------------<  146<H>  4.0   |  26  |  1.01    Ca    8.4      24 Dec 2023 07:10      Results and case was discussed with Dr. Montiel, patient is medically cleared and optimized for discharge home today, messaged heart failure fellow Dr. Galindo, with no response.   As per attending patient cleared for discharge, all medications were reviewed with attending, and sent to mutually agreed upon pharmacy >no lasix, continue tylenol/robitussin prn, continue current bumex/aldactone, continue simvastatin, resume home Omeprazole/Metformin/Jardiance/Vyndamax.   Discharge instructions discussed by writer with patient and patient's xander Merida at bedside, informed of importance of Hf, PCP and surgery followups and to contact office, both verbalized understanding, patient states he has all home meds and does not need refills. Sent bumex/aldactone to [facility] pharmacy, attempted to call multiple times with no response, daughter made aware to contact pharmacy tomorrow morning to pickup medications, JEAN PAUL Merrill instructed to provide tomorrow's dose of bumex/aldactone to patient to take home. Home care setup as per [facility].

## 2023-12-25 LAB
CULTURE RESULTS: SIGNIFICANT CHANGE UP
SPECIMEN SOURCE: SIGNIFICANT CHANGE UP

## 2024-01-04 ENCOUNTER — APPOINTMENT (OUTPATIENT)
Dept: TRAUMA SURGERY | Facility: CLINIC | Age: 84
End: 2024-01-04

## 2024-01-11 ENCOUNTER — OUTPATIENT (OUTPATIENT)
Dept: OUTPATIENT SERVICES | Facility: HOSPITAL | Age: 84
LOS: 1 days | End: 2024-01-11

## 2024-01-11 ENCOUNTER — APPOINTMENT (OUTPATIENT)
Dept: TRAUMA SURGERY | Facility: CLINIC | Age: 84
End: 2024-01-11
Payer: MEDICARE

## 2024-01-11 VITALS
WEIGHT: 130 LBS | OXYGEN SATURATION: 99 % | HEART RATE: 95 BPM | DIASTOLIC BLOOD PRESSURE: 74 MMHG | HEIGHT: 63 IN | SYSTOLIC BLOOD PRESSURE: 116 MMHG | BODY MASS INDEX: 23.04 KG/M2

## 2024-01-11 DIAGNOSIS — Z92.3 PERSONAL HISTORY OF IRRADIATION: Chronic | ICD-10-CM

## 2024-01-11 PROCEDURE — 99204 OFFICE O/P NEW MOD 45 MIN: CPT

## 2024-01-11 PROCEDURE — 99214 OFFICE O/P EST MOD 30 MIN: CPT

## 2024-01-11 NOTE — PLAN
[FreeTextEntry1] : - pre-op cardiac and medical optimization - plan for open R inguinal hernia repair with mesh

## 2024-01-11 NOTE — ASSESSMENT
[FreeTextEntry1] : 84yo M (frail) with h/o diastolic CHF/cardiac amyloidosis, cholecystectomy and prostatectomy presents with R inguinal hernia. Pt would benefit from open R inguinal hernia repair for large primary hernia (reducible at recent hospitalization for inguinal pain).

## 2024-01-11 NOTE — PHYSICAL EXAM
[Normal Breath Sounds] : Normal breath sounds [Normal Heart Sounds] : normal heart sounds [No Rash or Lesion] : No rash or lesion [Alert] : alert [Oriented to Person] : oriented to person [Oriented to Place] : oriented to place [Oriented to Time] : oriented to time [Calm] : calm [de-identified] : NAD, frail appearing  [de-identified] : No deformities, ambulates with cane  [de-identified] : soft, nondistended, nontender, R inguinal hernia not reducible when standing. Pt unable to get up to exam table for supine exam

## 2024-01-11 NOTE — HISTORY OF PRESENT ILLNESS
[de-identified] : 82yo M (frail) with h/o diastolic CHF/cardiac amyloidosis, cholecystectomy and prostatectomy presents with R inguinal hernia. Pt has been admitted twice in the past year with R inguinal pain, as well as exacerbations of CHF. Between these admissions, the pt reports minimal discomfort from the hernia. He reports that the hernia is able to be reduced when lying down, but comes back out immediately. Denies episodes of bowel obstruction. Urinates normally and has regular bowel movements. Pt is interested in hernia repair due to intermittent severe pain.  [de-identified] : Baseline functional status: pt lives alone with frequent visits from his daughters. He tries to do his own grocery shopping, but typically gets help from his daughters as well. He ambulates with a cane and performs all ADL's independently. He gets help with cleaning and some cooking.   Not currently on blood thinners. Is on diuretics.

## 2024-02-07 ENCOUNTER — APPOINTMENT (OUTPATIENT)
Dept: CARDIOLOGY | Facility: CLINIC | Age: 84
End: 2024-02-07

## 2024-02-21 ENCOUNTER — APPOINTMENT (OUTPATIENT)
Dept: CARDIOLOGY | Facility: CLINIC | Age: 84
End: 2024-02-21
Payer: MEDICARE

## 2024-02-21 ENCOUNTER — NON-APPOINTMENT (OUTPATIENT)
Age: 84
End: 2024-02-21

## 2024-02-21 VITALS
WEIGHT: 130 LBS | SYSTOLIC BLOOD PRESSURE: 98 MMHG | HEIGHT: 63 IN | OXYGEN SATURATION: 98 % | HEART RATE: 71 BPM | DIASTOLIC BLOOD PRESSURE: 60 MMHG | BODY MASS INDEX: 23.04 KG/M2

## 2024-02-21 PROCEDURE — 99215 OFFICE O/P EST HI 40 MIN: CPT

## 2024-02-21 PROCEDURE — G2211 COMPLEX E/M VISIT ADD ON: CPT

## 2024-02-21 PROCEDURE — 93000 ELECTROCARDIOGRAM COMPLETE: CPT

## 2024-02-21 RX ORDER — EMPAGLIFLOZIN 10 MG/1
10 TABLET, FILM COATED ORAL DAILY
Qty: 90 | Refills: 3 | Status: DISCONTINUED | COMMUNITY
Start: 2023-11-02 | End: 2024-02-21

## 2024-02-25 LAB
ALBUMIN SERPL ELPH-MCNC: 4.2 G/DL
ALBUMIN SERPL ELPH-MCNC: 4.2 G/DL
ALP BLD-CCNC: 87 U/L
ALP BLD-CCNC: 87 U/L
ALT SERPL-CCNC: 16 U/L
ALT SERPL-CCNC: 16 U/L
ANION GAP SERPL CALC-SCNC: 14 MMOL/L
ANION GAP SERPL CALC-SCNC: 14 MMOL/L
AST SERPL-CCNC: 25 U/L
AST SERPL-CCNC: 25 U/L
BILIRUB SERPL-MCNC: 0.4 MG/DL
BILIRUB SERPL-MCNC: 0.4 MG/DL
BUN SERPL-MCNC: 30 MG/DL
BUN SERPL-MCNC: 30 MG/DL
CALCIUM SERPL-MCNC: 9.5 MG/DL
CALCIUM SERPL-MCNC: 9.5 MG/DL
CHLORIDE SERPL-SCNC: 102 MMOL/L
CHLORIDE SERPL-SCNC: 102 MMOL/L
CO2 SERPL-SCNC: 23 MMOL/L
CO2 SERPL-SCNC: 23 MMOL/L
CREAT SERPL-MCNC: 1.13 MG/DL
CREAT SERPL-MCNC: 1.13 MG/DL
EGFR: 65 ML/MIN/1.73M2
EGFR: 65 ML/MIN/1.73M2
GLUCOSE SERPL-MCNC: 111 MG/DL
NT-PROBNP SERPL-MCNC: 1512 PG/ML
POTASSIUM SERPL-SCNC: 4.6 MMOL/L
POTASSIUM SERPL-SCNC: 4.6 MMOL/L
PREALB SERPL NEPH-MCNC: 26 MG/DL
PROT SERPL-MCNC: 7.1 G/DL
PROT SERPL-MCNC: 7.1 G/DL
SODIUM SERPL-SCNC: 139 MMOL/L
SODIUM SERPL-SCNC: 139 MMOL/L

## 2024-02-25 RX ORDER — SPIRONOLACTONE 25 MG/1
25 TABLET ORAL
Qty: 30 | Refills: 5 | Status: ACTIVE | COMMUNITY
Start: 2024-02-25

## 2024-02-25 RX ORDER — BUMETANIDE 1 MG/1
1 TABLET ORAL
Refills: 0 | Status: ACTIVE | COMMUNITY
Start: 2024-02-25

## 2024-02-27 NOTE — HISTORY OF PRESENT ILLNESS
[FreeTextEntry1] : Patient is an 82 year-old Black gentleman with known past medical history of noninsulin dependent type II diabetes, prostate ca, who has been experiencing bilateral lower extremity edema for more than a year, recently admitted to [facility], followed-up as outpatient with Leo Quesada MD, and was noted to have evidence of cardiac amyloidosis. Technetium pyrophosphate scan was positive.  Of note, patient reports neuropathy in his fingers and toes that has been present and progressive for at least the past several years.   Patient's 41 year-old son was recently diagnosed with hereditary TTR amyloidosis.   Patient has not been sick with Covid-19. He has received two shots and a booster, all from [facility].  August 2022 - Patient returns today for follow-up. He started Vyndamax 61 mg daily approximately two weeks ago. He does not notice any change in his condition. He has run out of his loop diuretics, and so he has not been on any diuretic recently.   9/16/2022. Pedro Jurado returns today for scheduled follow up. He is accompanied by his daughter, Radha. Today he is feeling well. He has been taking Vyndamax and furosemide without any difficulty. He has been using a cane when he walks for gait stabilization, but otherwise remains independent. His only complaint today is for neuropathy in his fingers and some lower back pain.  January 2023 - Patient returns today for follow-up in his usual state of health. Patient stopped his Vyndamax on January 1 because he thought it was causing urinary frequency.  He does not believe he is currently taking a diuretic. He has bilateral numbness in the hands, and he has chronic unsteadiness that was previously attributed to Menieres disease.   May 2023 - Patient returns today for follow-up in his usual state of health. He remains on Vyndamax, but he has not yet started silencer therapy.  July 2023 - Patient returns today for follow-up in his usual state of health. He remains on Vyndamax, but he has not yet started silencer therapy. He was hospitalized at [facility] in April 2023 because he discontinued his medications at home.  Since he has been home, he has been compliant on his medications. He is doing much better since he is home.

## 2024-02-27 NOTE — DISCUSSION/SUMMARY
[EKG obtained to assist in diagnosis and management of assessed problem(s)] : EKG obtained to assist in diagnosis and management of assessed problem(s) [FreeTextEntry1] : Patient is an 83-year-old Black gentleman with history as above who is referred now for evaluation and management of cardiac amyloidosis.  Light chain studies excluded AL-CA (normal kappa: lambda ratio), confirming diagnosis of TTR amyloidosis. 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  Continue TTR stabilizer therapy with Vyndamax. Recommend starting Amvuttra for patient with hereditary TTR amyloidosis with polyneuropathy. Patient will reconsider starting therapy. Previously he declined as he did not want to take an injectable medication.   No changes to his medications were recommended.  He is free from any acute coronary syndrome, decompensated heart failure, dangerous arrhythmia or critical valvular stenosis and may proceed with hernia repair as planned.   Follow up in 3 months, or sooner should need be.

## 2024-02-27 NOTE — CARDIOLOGY SUMMARY
[de-identified] : 5/17/2022, NSR with 1st degree AVB, LAE. Low voltage in the limb leads. 2/21/2024, NSR with first degree AV delay, Jazzmine = 244 msec, diffuse nonspecific T-abnormality, ,ow voltage in the limb leads, unchanged.  [de-identified] : 5/6/2022, septum 1.3 cm, PWT 1.2 cm, dilated LA, mild concentric LVH, severe pulmonary hypertension, LVEF 57% 4/6/2023, TTE.  1. Calcified trileaflet aortic valve with normal opening. 2. Moderately dilated left atrium.  LA volume index = 45 cc/m2. 3. Severe concentric left ventricular hypertrophy. 4. Low normal left ventricular systolic function.  [de-identified] : 6/21/2022, technetium pyrophosphate scan, grade 3, ratio 1.9 [de-identified] : 5/9/2022 with Mahamed Nguyen MD at [facility] - patent coronary arteries, mild pulmonary hypertension

## 2024-02-27 NOTE — END OF VISIT
[FreeTextEntry3] : I saw the patient with Esthela Gandhi NP and I agree with the findings and plan as above.

## 2024-02-27 NOTE — REASON FOR VISIT
[FreeTextEntry3] : Neal Seaman MD [FreeTextEntry1] : 2/21/2024 Pedro Jurado returns today for preoperative cardiovascular evaluation prior to planned inguinal hernia repair with Donita Gan MD at [facility]. He has not yet scheduled the procedure pending cardiac clearance. As he returns today he is feeling generally well. He reports that his breathing has been stable. He denies any dyspnea, orthopnea or peripheral edema. He continues to have neuropathy which he feels as constant numbness in his hands and fingers. He denies any chest discomfort, shortness of breath, palpitations, lightheadedness or syncope. We have reviewed his medications and he is taking all as directed.

## 2024-03-15 ENCOUNTER — OUTPATIENT (OUTPATIENT)
Dept: OUTPATIENT SERVICES | Facility: HOSPITAL | Age: 84
LOS: 1 days | End: 2024-03-15

## 2024-03-15 VITALS
DIASTOLIC BLOOD PRESSURE: 70 MMHG | TEMPERATURE: 97 F | WEIGHT: 130.07 LBS | SYSTOLIC BLOOD PRESSURE: 124 MMHG | OXYGEN SATURATION: 97 % | RESPIRATION RATE: 18 BRPM | HEIGHT: 63 IN | HEART RATE: 83 BPM

## 2024-03-15 DIAGNOSIS — Z92.3 PERSONAL HISTORY OF IRRADIATION: Chronic | ICD-10-CM

## 2024-03-15 DIAGNOSIS — K40.90 UNILATERAL INGUINAL HERNIA, WITHOUT OBSTRUCTION OR GANGRENE, NOT SPECIFIED AS RECURRENT: ICD-10-CM

## 2024-03-15 DIAGNOSIS — E85.4 ORGAN-LIMITED AMYLOIDOSIS: ICD-10-CM

## 2024-03-15 DIAGNOSIS — E11.9 TYPE 2 DIABETES MELLITUS WITHOUT COMPLICATIONS: ICD-10-CM

## 2024-03-15 DIAGNOSIS — K40.30 UNILATERAL INGUINAL HERNIA, WITH OBSTRUCTION, WITHOUT GANGRENE, NOT SPECIFIED AS RECURRENT: ICD-10-CM

## 2024-03-15 DIAGNOSIS — Z91.89 OTHER SPECIFIED PERSONAL RISK FACTORS, NOT ELSEWHERE CLASSIFIED: ICD-10-CM

## 2024-03-15 RX ORDER — EMPAGLIFLOZIN 10 MG/1
1 TABLET, FILM COATED ORAL
Refills: 0 | DISCHARGE

## 2024-03-15 RX ORDER — GABAPENTIN 400 MG/1
1 CAPSULE ORAL
Refills: 0 | DISCHARGE

## 2024-03-15 NOTE — H&P PST ADULT - PROBLEM SELECTOR PLAN 2
Pt instructed to take Vyndamax on morning of surgery.  Pending copy of medical eval Pt instructed to take Vyndamax on morning of surgery.  Pending copy of cardiology eval & most recent echo report

## 2024-03-15 NOTE — H&P PST ADULT - PROBLEM SELECTOR PLAN 1
Written & verbal preop instructions, gi prophylaxis & surgical soap given  Pt verbalized good understanding.  Teach back done on surgical soap instructions. Scheduled for open right inguinal hernia repair with mesh  Written & verbal preop instructions, gi prophylaxis & surgical soap given  Pt verbalized good understanding.  Teach back done on surgical soap instructions.

## 2024-03-15 NOTE — H&P PST ADULT - NSICDXPASTMEDICALHX_GEN_ALL_CORE_FT
PAST MEDICAL HISTORY:  Diabetes mellitus, type 2     Edema of both lower legs     Malignant neoplasm of prostate     Meniere disease     Severe pulmonary hypertension      PAST MEDICAL HISTORY:  Cardiac amyloidosis     Diabetes mellitus, type 2     Edema of both lower legs     Malignant neoplasm of prostate     Meniere disease     Pulmonary artery hypertension     Right inguinal hernia

## 2024-03-15 NOTE — H&P PST ADULT - HISTORY OF PRESENT ILLNESS
84yo male presents for preop eval for scheduled open right inguinal hernia repair with mesh.  P with h/o diastolic CHF/cardiac amyloidosis, cholecystectomy and prostatectomy.  Pt has been admitted twice in the past year with R inguinal pain, as well as exacerbations of CHF. Pt reports intermittent discomfort from the right hernia, reduced when lying down, but comes back out immediately. Urinates normally and has regular bowel movements.

## 2024-03-18 ENCOUNTER — TRANSCRIPTION ENCOUNTER (OUTPATIENT)
Age: 84
End: 2024-03-18

## 2024-03-18 NOTE — ASU PATIENT PROFILE, ADULT - NSICDXPASTMEDICALHX_GEN_ALL_CORE_FT
PAST MEDICAL HISTORY:  Cardiac amyloidosis     Diabetes mellitus, type 2     Edema of both lower legs     Malignant neoplasm of prostate     Meniere disease     Pulmonary artery hypertension     Right inguinal hernia

## 2024-03-18 NOTE — ASU PATIENT PROFILE, ADULT - FALL HARM RISK - HARM RISK INTERVENTIONS

## 2024-03-19 ENCOUNTER — TRANSCRIPTION ENCOUNTER (OUTPATIENT)
Age: 84
End: 2024-03-19

## 2024-03-19 ENCOUNTER — INPATIENT (INPATIENT)
Facility: HOSPITAL | Age: 84
LOS: 1 days | Discharge: HOME | End: 2024-03-21
Attending: STUDENT IN AN ORGANIZED HEALTH CARE EDUCATION/TRAINING PROGRAM | Admitting: STUDENT IN AN ORGANIZED HEALTH CARE EDUCATION/TRAINING PROGRAM
Payer: MEDICARE

## 2024-03-19 ENCOUNTER — APPOINTMENT (OUTPATIENT)
Dept: TRAUMA SURGERY | Facility: HOSPITAL | Age: 84
End: 2024-03-19

## 2024-03-19 VITALS
SYSTOLIC BLOOD PRESSURE: 110 MMHG | HEIGHT: 63 IN | OXYGEN SATURATION: 100 % | HEART RATE: 88 BPM | DIASTOLIC BLOOD PRESSURE: 53 MMHG | WEIGHT: 130.07 LBS | RESPIRATION RATE: 16 BRPM | TEMPERATURE: 98 F

## 2024-03-19 DIAGNOSIS — Z92.3 PERSONAL HISTORY OF IRRADIATION: Chronic | ICD-10-CM

## 2024-03-19 DIAGNOSIS — K40.90 UNILATERAL INGUINAL HERNIA, WITHOUT OBSTRUCTION OR GANGRENE, NOT SPECIFIED AS RECURRENT: ICD-10-CM

## 2024-03-19 LAB
ANION GAP SERPL CALC-SCNC: 10 MMOL/L — SIGNIFICANT CHANGE UP (ref 7–14)
BASE EXCESS BLDA CALC-SCNC: -0.5 MMOL/L — SIGNIFICANT CHANGE UP (ref -2–3)
BUN SERPL-MCNC: 25 MG/DL — HIGH (ref 7–23)
CALCIUM SERPL-MCNC: 9 MG/DL — SIGNIFICANT CHANGE UP (ref 8.4–10.5)
CHLORIDE SERPL-SCNC: 106 MMOL/L — SIGNIFICANT CHANGE UP (ref 98–107)
CO2 BLDA-SCNC: 24 MMOL/L — SIGNIFICANT CHANGE UP (ref 19–24)
CO2 SERPL-SCNC: 23 MMOL/L — SIGNIFICANT CHANGE UP (ref 22–31)
CREAT SERPL-MCNC: 1.04 MG/DL — SIGNIFICANT CHANGE UP (ref 0.5–1.3)
EGFR: 72 ML/MIN/1.73M2 — SIGNIFICANT CHANGE UP
GLUCOSE BLDC GLUCOMTR-MCNC: 128 MG/DL — HIGH (ref 70–99)
GLUCOSE BLDC GLUCOMTR-MCNC: 133 MG/DL — HIGH (ref 70–99)
GLUCOSE BLDC GLUCOMTR-MCNC: 156 MG/DL — HIGH (ref 70–99)
GLUCOSE BLDC GLUCOMTR-MCNC: 168 MG/DL — HIGH (ref 70–99)
GLUCOSE SERPL-MCNC: 144 MG/DL — HIGH (ref 70–99)
HCO3 BLDA-SCNC: 23 MMOL/L — SIGNIFICANT CHANGE UP (ref 21–28)
HCT VFR BLD CALC: 30.1 % — LOW (ref 39–50)
HGB BLD-MCNC: 10.1 G/DL — LOW (ref 13–17)
HOROWITZ INDEX BLDA+IHG-RTO: 21 — SIGNIFICANT CHANGE UP
MCHC RBC-ENTMCNC: 27.7 PG — SIGNIFICANT CHANGE UP (ref 27–34)
MCHC RBC-ENTMCNC: 33.6 GM/DL — SIGNIFICANT CHANGE UP (ref 32–36)
MCV RBC AUTO: 82.7 FL — SIGNIFICANT CHANGE UP (ref 80–100)
NRBC # BLD: 0 /100 WBCS — SIGNIFICANT CHANGE UP (ref 0–0)
NRBC # FLD: 0 K/UL — SIGNIFICANT CHANGE UP (ref 0–0)
PCO2 BLDA: 34 MMHG — LOW (ref 35–48)
PH BLDA: 7.44 — SIGNIFICANT CHANGE UP (ref 7.35–7.45)
PLATELET # BLD AUTO: 172 K/UL — SIGNIFICANT CHANGE UP (ref 150–400)
PO2 BLDA: 141 MMHG — HIGH (ref 83–108)
POTASSIUM SERPL-MCNC: 4.2 MMOL/L — SIGNIFICANT CHANGE UP (ref 3.5–5.3)
POTASSIUM SERPL-SCNC: 4.2 MMOL/L — SIGNIFICANT CHANGE UP (ref 3.5–5.3)
RBC # BLD: 3.64 M/UL — LOW (ref 4.2–5.8)
RBC # FLD: 18.5 % — HIGH (ref 10.3–14.5)
SAO2 % BLDA: 99.5 % — HIGH (ref 94–98)
SODIUM SERPL-SCNC: 139 MMOL/L — SIGNIFICANT CHANGE UP (ref 135–145)
WBC # BLD: 6.51 K/UL — SIGNIFICANT CHANGE UP (ref 3.8–10.5)
WBC # FLD AUTO: 6.51 K/UL — SIGNIFICANT CHANGE UP (ref 3.8–10.5)

## 2024-03-19 PROCEDURE — 93010 ELECTROCARDIOGRAM REPORT: CPT

## 2024-03-19 DEVICE — LIGATING CLIPS WECK HORIZON LARGE (ORANGE) 24: Type: IMPLANTABLE DEVICE | Site: RIGHT | Status: FUNCTIONAL

## 2024-03-19 DEVICE — LIGATING CLIPS WECK HORIZON MEDIUM (BLUE) 24: Type: IMPLANTABLE DEVICE | Site: RIGHT | Status: FUNCTIONAL

## 2024-03-19 DEVICE — MESH HERNIA PARIETEX PROGRIP 12 X 8CM RIGHT: Type: IMPLANTABLE DEVICE | Site: RIGHT | Status: FUNCTIONAL

## 2024-03-19 RX ORDER — DEXTROSE 50 % IN WATER 50 %
12.5 SYRINGE (ML) INTRAVENOUS ONCE
Refills: 0 | Status: DISCONTINUED | OUTPATIENT
Start: 2024-03-19 | End: 2024-03-21

## 2024-03-19 RX ORDER — INSULIN LISPRO 100/ML
VIAL (ML) SUBCUTANEOUS
Refills: 0 | Status: DISCONTINUED | OUTPATIENT
Start: 2024-03-19 | End: 2024-03-21

## 2024-03-19 RX ORDER — SODIUM CHLORIDE 9 MG/ML
1000 INJECTION, SOLUTION INTRAVENOUS
Refills: 0 | Status: DISCONTINUED | OUTPATIENT
Start: 2024-03-19 | End: 2024-03-21

## 2024-03-19 RX ORDER — ACETAMINOPHEN 500 MG
650 TABLET ORAL EVERY 6 HOURS
Refills: 0 | Status: DISCONTINUED | OUTPATIENT
Start: 2024-03-19 | End: 2024-03-21

## 2024-03-19 RX ORDER — TAFAMIDIS 61 MG/1
1 CAPSULE, LIQUID FILLED ORAL
Refills: 0 | DISCHARGE

## 2024-03-19 RX ORDER — OXYCODONE HYDROCHLORIDE 5 MG/1
5 TABLET ORAL ONCE
Refills: 0 | Status: DISCONTINUED | OUTPATIENT
Start: 2024-03-19 | End: 2024-03-19

## 2024-03-19 RX ORDER — GLUCAGON INJECTION, SOLUTION 0.5 MG/.1ML
1 INJECTION, SOLUTION SUBCUTANEOUS ONCE
Refills: 0 | Status: DISCONTINUED | OUTPATIENT
Start: 2024-03-19 | End: 2024-03-21

## 2024-03-19 RX ORDER — ALBUMIN HUMAN 25 %
250 VIAL (ML) INTRAVENOUS ONCE
Refills: 0 | Status: COMPLETED | OUTPATIENT
Start: 2024-03-19 | End: 2024-03-19

## 2024-03-19 RX ORDER — DEXTROSE 50 % IN WATER 50 %
25 SYRINGE (ML) INTRAVENOUS ONCE
Refills: 0 | Status: DISCONTINUED | OUTPATIENT
Start: 2024-03-19 | End: 2024-03-21

## 2024-03-19 RX ORDER — ONDANSETRON 8 MG/1
4 TABLET, FILM COATED ORAL ONCE
Refills: 0 | Status: DISCONTINUED | OUTPATIENT
Start: 2024-03-19 | End: 2024-03-19

## 2024-03-19 RX ORDER — METFORMIN HYDROCHLORIDE 850 MG/1
1 TABLET ORAL
Refills: 0 | DISCHARGE

## 2024-03-19 RX ORDER — OXYCODONE HYDROCHLORIDE 5 MG/1
1 TABLET ORAL
Qty: 10 | Refills: 0
Start: 2024-03-19

## 2024-03-19 RX ORDER — SIMVASTATIN 20 MG/1
1 TABLET, FILM COATED ORAL
Qty: 0 | Refills: 0 | DISCHARGE

## 2024-03-19 RX ORDER — SODIUM CHLORIDE 9 MG/ML
1000 INJECTION, SOLUTION INTRAVENOUS
Refills: 0 | Status: DISCONTINUED | OUTPATIENT
Start: 2024-03-19 | End: 2024-03-19

## 2024-03-19 RX ORDER — OMEPRAZOLE 10 MG/1
1 CAPSULE, DELAYED RELEASE ORAL
Qty: 0 | Refills: 0 | DISCHARGE

## 2024-03-19 RX ORDER — SODIUM CHLORIDE 9 MG/ML
250 INJECTION, SOLUTION INTRAVENOUS ONCE
Refills: 0 | Status: COMPLETED | OUTPATIENT
Start: 2024-03-19 | End: 2024-03-19

## 2024-03-19 RX ORDER — HEPARIN SODIUM 5000 [USP'U]/ML
5000 INJECTION INTRAVENOUS; SUBCUTANEOUS EVERY 8 HOURS
Refills: 0 | Status: DISCONTINUED | OUTPATIENT
Start: 2024-03-19 | End: 2024-03-21

## 2024-03-19 RX ORDER — DEXTROSE 50 % IN WATER 50 %
15 SYRINGE (ML) INTRAVENOUS ONCE
Refills: 0 | Status: DISCONTINUED | OUTPATIENT
Start: 2024-03-19 | End: 2024-03-21

## 2024-03-19 RX ORDER — FENTANYL CITRATE 50 UG/ML
25 INJECTION INTRAVENOUS
Refills: 0 | Status: DISCONTINUED | OUTPATIENT
Start: 2024-03-19 | End: 2024-03-19

## 2024-03-19 RX ORDER — INSULIN LISPRO 100/ML
VIAL (ML) SUBCUTANEOUS AT BEDTIME
Refills: 0 | Status: DISCONTINUED | OUTPATIENT
Start: 2024-03-19 | End: 2024-03-21

## 2024-03-19 RX ADMIN — HEPARIN SODIUM 5000 UNIT(S): 5000 INJECTION INTRAVENOUS; SUBCUTANEOUS at 22:09

## 2024-03-19 RX ADMIN — SODIUM CHLORIDE 500 MILLILITER(S): 9 INJECTION, SOLUTION INTRAVENOUS at 14:56

## 2024-03-19 RX ADMIN — Medication 125 MILLILITER(S): at 16:57

## 2024-03-19 RX ADMIN — Medication 650 MILLIGRAM(S): at 22:57

## 2024-03-19 RX ADMIN — Medication 650 MILLIGRAM(S): at 22:27

## 2024-03-19 NOTE — CONSULT NOTE ADULT - ATTENDING COMMENTS
I agree with the detailed interval history, physical, and plan, which I have reviewed and edited where appropriate'; also agree with notes/assessment with my team on service.  I have personally examined the patient.  I was physically present for the key portions of the evaluation and management (E/M) service provided.  I reviewed all the pertinent data.  The patient is a critical care patient with life threatening hemodynamic and metabolic instability in SICU.  The SICU team has a constant risk benefit analyzes discussion and coordinating care with the primary team and all consultants.   The patient is in SICU with the chief complaint and diagnosis mentioned in the note.   The plan will be specified in the note.  83y yo Male with cardiac amyloidosis, s/p open Right inguinal hernia repair with mesh. Patient hypotensive post-operatively.  SICU consulted for hemodynamic monitoring,   PHYSICAL EXAM:  NEURO  Exam: NAD  RESPIRATORY  Exam: clear  CARDIOVASCULAR  Exam: RR  ABDOMEN:  Exam: soft, nondistended, nontender.  GENITOURINARY/RENAL  Exam: R groin incision  PLAN:  Neuro:  - tylenol, oxy, fent prn  Respiratory:  - RA  Cardiovascular:  - f/u TTE  Gastrointestinal:  - Diet: NPO  Genitourinary/Renal:  - LR at 100 cc/hr  Heme:  - SQH q8h  ID:   - Trend WBC   Endocrine:  - Trend FS   DISPO: SICU    CODE STATUS: Full

## 2024-03-19 NOTE — CONSULT NOTE ADULT - SUBJECTIVE AND OBJECTIVE BOX
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, pHTN, HFpEF, prostate CA    --------------------------------------------------------------------------------------  PAST MEDICAL HISTORY:   DM (Diabetes Mellitus)    Malignant neoplasm of prostate    Meniere disease    Severe pulmonary hypertension    Bradycardic cardiac arrest    Cardiac arrest    Diabetes mellitus, type 2    Edema of both lower legs    Cardiac amyloidosis    Pulmonary artery hypertension    Right inguinal hernia        PAST SURGICAL HISTORY:   S/P Appendectomy    S/P colonoscopy with polypectomy    H/O left inguinal hernia repair    Arm fracture, right    S/P laparoscopic cholecystectomy    History of brachytherapy        FAMILY HISTORY:   No pertinent family history in first degree relatives    Family history of hypertension (Father, Mother, Sibling, Sibling)    Family history of diabetes mellitus (Father, Mother, Sibling, Sibling)        HOME MEDICATIONS:    ALLERGIES:   No Known Allergies      --------------------------------------------------------------------------------------    VITAL SIGNS:  ICU Vital Signs Last 24 Hrs  T(C): 36.7 (19 Mar 2024 14:10), Max: 36.7 (19 Mar 2024 14:10)  T(F): 98.1 (19 Mar 2024 14:10), Max: 98.1 (19 Mar 2024 14:10)  HR: 78 (19 Mar 2024 16:15) (64 - 88)  BP: 98/54 (19 Mar 2024 16:00) (86/50 - 110/53)  BP(mean): 65 (19 Mar 2024 16:00) (58 - 68)  ABP: --  ABP(mean): --  RR: 20 (19 Mar 2024 16:15) (11 - 20)  SpO2: 100% (19 Mar 2024 16:15) (100% - 100%)    O2 Parameters below as of 19 Mar 2024 15:30  Patient On (Oxygen Delivery Method): room air            PHYSICAL EXAM:    NEURO  Exam:    RESPIRATORY  Exam:   Mechanical Ventilation:     CARDIOVASCULAR  Exam:  Cardiac Rhythm:    GI/NUTRITION  Exam:  Diet:    GENITOURINARY/RENAL  Exam:   [ ] Otero catheter, indication: urine output monitoring in critically ill patient    HEMATOLOGIC  VTE Prophylaxis:    ENDOCRINE  Glucose:  CAPILLARY BLOOD GLUCOSE      POCT Blood Glucose.: 156 mg/dL (19 Mar 2024 14:29)  POCT Blood Glucose.: 128 mg/dL (19 Mar 2024 11:11)        PATIENT CARE ACCESS DEVICES:  [ ] Peripheral IV  [ ] Central Venous Line	[ ] R	[ ] L	[ ] IJ	[ ] Fem	[ ] SC	Placed:   [ ] Arterial Line		[ ] R	[ ] L	[ ] Fem	[ ] Rad	[ ] Ax	Placed:   [ ] PICC:					[ ] Mediport  [ ] Urinary Catheter, Date Placed:   [x] Necessity of urinary, arterial, and venous catheters discussed      I/Os:  I&O's Summary      --------------------------------------------------------------------------------------  MEDICATIONS:   Neurologic Medications  fentaNYL    Injectable 25 MICROGram(s) IV Push every 5 minutes PRN Severe Pain (7 - 10)  ondansetron Injectable 4 milliGRAM(s) IV Push once PRN Nausea and/or Vomiting  oxyCODONE    IR 5 milliGRAM(s) Oral once PRN Moderate Pain (4 - 6)    Respiratory Medications    Cardiovascular Medications    Gastrointestinal Medications  albumin human  5% IVPB 250 milliLiter(s) IV Intermittent once  lactated ringers. 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 Patient tolerated procedure well, no complication. Patient hypotensive post-operatively, responded to 500cc albumin. SICU consulted for hemodynamic monitoring,   --------------------------------------------------------------------------------------  PAST MEDICAL HISTORY:   DM (Diabetes Mellitus)  Malignant neoplasm of prostate  Meniere disease  Severe pulmonary hypertension  Bradycardic cardiac arrest  Cardiac arrest  Diabetes mellitus, type 2  Edema of both lower legs  Cardiac amyloidosis  Pulmonary artery hypertension  Right inguinal hernia        PAST SURGICAL HISTORY:   S/P Appendectomy  S/P colonoscopy with polypectomy  H/O left inguinal hernia repair  Arm fracture, right  S/P laparoscopic cholecystectomy  History of brachytherapy        FAMILY HISTORY:   No pertinent family history in first degree relatives  Family history of hypertension (Father, Mother, Sibling, Sibling)  Family history of diabetes mellitus (Father, Mother, Sibling, Sibling)        HOME MEDICATIONS:    ALLERGIES:   No Known Allergies  --------------------------------------------------------------------------------------    VITAL SIGNS:  ICU Vital Signs Last 24 Hrs  T(C): 36.7 (19 Mar 2024 14:10), Max: 36.7 (19 Mar 2024 14:10)  T(F): 98.1 (19 Mar 2024 14:10), Max: 98.1 (19 Mar 2024 14:10)  HR: 78 (19 Mar 2024 16:15) (64 - 88)  BP: 98/54 (19 Mar 2024 16:00) (86/50 - 110/53)  BP(mean): 65 (19 Mar 2024 16:00) (58 - 68)  RR: 20 (19 Mar 2024 16:15) (11 - 20)  SpO2: 100% (19 Mar 2024 16:15) (100% - 100%)  O2 Parameters below as of 19 Mar 2024 15:30  Patient On (Oxygen Delivery Method): room air          PHYSICAL EXAM:    NEURO  Exam: NAD, A&Ox3    RESPIRATORY  Exam: CTA B/L, no wheezing, satting well on RA    CARDIOVASCULAR  Exam: normal sinus rhythm, S1, S2, no rales      ABDOMEN:  Exam: soft, nondistended, nontender.  Diet:    GENITOURINARY/RENAL  Exam: R groin incision sit, c/d/i with steristrips      HEMATOLOGIC  VTE Prophylaxis: SQH    ENDOCRINE  Glucose:  CAPILLARY BLOOD GLUCOSE      POCT Blood Glucose.: 156 mg/dL (19 Mar 2024 14:29)  POCT Blood Glucose.: 128 mg/dL (19 Mar 2024 11:11)        PATIENT CARE ACCESS DEVICES:  [x] Peripheral IV  [ ] Central Venous Line	[ ] R	[ ] L	[ ] IJ	[ ] Fem	[ ] SC	Placed:   [x] Arterial Line		[ ] R	[ ] L	[ ] Fem	[ ] Rad	[ ] Ax	Placed:   [ ] PICC:					[ ] Mediport  [ ] Urinary Catheter, Date Placed:   [x] Necessity of urinary, arterial, and venous catheters discussed      I/Os:  I&O's Summary      --------------------------------------------------------------------------------------  MEDICATIONS:   Neurologic Medications  fentaNYL    Injectable 25 MICROGram(s) IV Push every 5 minutes PRN Severe Pain (7 - 10)  ondansetron Injectable 4 milliGRAM(s) IV Push once PRN Nausea and/or Vomiting  oxyCODONE    IR 5 milliGRAM(s) Oral once PRN Moderate Pain (4 - 6)      Gastrointestinal Medications  albumin human  5% IVPB 250 milliLiter(s) IV Intermittent once  lactated ringers. 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 Patient tolerated procedure well, no complication. Patient hypotensive post-operatively, responded to 500cc albumin and intermittent push dose phenylephrine. SICU consulted for hemodynamic monitoring,   --------------------------------------------------------------------------------------  PAST MEDICAL HISTORY:   DM (Diabetes Mellitus)  Malignant neoplasm of prostate  Meniere disease  Severe pulmonary hypertension  Bradycardic cardiac arrest  Cardiac arrest  Diabetes mellitus, type 2  Edema of both lower legs  Cardiac amyloidosis  Pulmonary artery hypertension  Right inguinal hernia        PAST SURGICAL HISTORY:   S/P Appendectomy  S/P colonoscopy with polypectomy  H/O left inguinal hernia repair  Arm fracture, right  S/P laparoscopic cholecystectomy  History of brachytherapy        FAMILY HISTORY:   No pertinent family history in first degree relatives  Family history of hypertension (Father, Mother, Sibling, Sibling)  Family history of diabetes mellitus (Father, Mother, Sibling, Sibling)        HOME MEDICATIONS:    ALLERGIES:   No Known Allergies  --------------------------------------------------------------------------------------    VITAL SIGNS:  ICU Vital Signs Last 24 Hrs  T(C): 36.7 (19 Mar 2024 14:10), Max: 36.7 (19 Mar 2024 14:10)  T(F): 98.1 (19 Mar 2024 14:10), Max: 98.1 (19 Mar 2024 14:10)  HR: 78 (19 Mar 2024 16:15) (64 - 88)  BP: 98/54 (19 Mar 2024 16:00) (86/50 - 110/53)  BP(mean): 65 (19 Mar 2024 16:00) (58 - 68)  RR: 20 (19 Mar 2024 16:15) (11 - 20)  SpO2: 100% (19 Mar 2024 16:15) (100% - 100%)  O2 Parameters below as of 19 Mar 2024 15:30  Patient On (Oxygen Delivery Method): room air          PHYSICAL EXAM:    NEURO  Exam: NAD, A&Ox3    RESPIRATORY  Exam: CTA B/L, no wheezing, satting well on RA    CARDIOVASCULAR  Exam: normal sinus rhythm, S1, S2, no rales      ABDOMEN:  Exam: soft, nondistended, nontender.  Diet:    GENITOURINARY/RENAL  Exam: R groin incision sit, c/d/i with steristrips      HEMATOLOGIC  VTE Prophylaxis: SQH    ENDOCRINE  Glucose:  CAPILLARY BLOOD GLUCOSE      POCT Blood Glucose.: 156 mg/dL (19 Mar 2024 14:29)  POCT Blood Glucose.: 128 mg/dL (19 Mar 2024 11:11)        PATIENT CARE ACCESS DEVICES:  [x] Peripheral IV  [ ] Central Venous Line	[ ] R	[ ] L	[ ] IJ	[ ] Fem	[ ] SC	Placed:   [x] Arterial Line		[ ] R	[ ] L	[ ] Fem	[ ] Rad	[ ] Ax	Placed:   [ ] PICC:					[ ] Mediport  [ ] Urinary Catheter, Date Placed:   [x] Necessity of urinary, arterial, and venous catheters discussed      I/Os:  I&O's Summary      --------------------------------------------------------------------------------------  MEDICATIONS:   Neurologic Medications  fentaNYL    Injectable 25 MICROGram(s) IV Push every 5 minutes PRN Severe Pain (7 - 10)  ondansetron Injectable 4 milliGRAM(s) IV Push once PRN Nausea and/or Vomiting  oxyCODONE    IR 5 milliGRAM(s) Oral once PRN Moderate Pain (4 - 6)      Gastrointestinal Medications  albumin human  5% IVPB 250 milliLiter(s) IV Intermittent once  lactated ringers. 1000 milliLiter(s) IV Continuous <Continuous>          --------------------------------------------------------------------------------------  LABS:

## 2024-03-19 NOTE — CHART NOTE - NSCHARTNOTEFT_GEN_A_CORE
Post Operative Note  Patient: MACK NORTH 83y (1940) Male   MRN: [id number]  Location: [place] [address]  Date: 03-19-24 @ 23:25    PROCEDURE:  Open repair of inguinal hernia using mesh in adult    Subjective: Patient seen and examined post operatively. Reports pain appropriately controlled, denies nausea/vomit, fever/chills, sob/chest pain. Has been able to tolerate food with no acute distress but has not gotten out or bed or voided since surgery.    Objective:  Vitals: T(F): 98.1 (03-19-24 @ 21:00), Max: 98.1 (03-19-24 @ 14:10)  HR: 81 (03-19-24 @ 23:00)  BP: 107/59 (03-19-24 @ 23:00) (86/50 - 110/65)  RR: 19 (03-19-24 @ 23:00)  SpO2: 100% (03-19-24 @ 23:00)    In:   03-19-24 @ 07:01  -  03-19-24 @ 23:25  --------------------------------------------------------  IN: 240 mL      IV Fluids: dextrose 5%. 1000 milliLiter(s) (100 mL/Hr) IV Continuous <Continuous>  dextrose 5%. 1000 milliLiter(s) (50 mL/Hr) IV Continuous <Continuous>      Out:   03-19-24 @ 07:01  -  03-19-24 @ 23:25  --------------------------------------------------------  OUT: 400 mL      Voided Urine:   03-19-24 @ 07:01  -  03-19-24 @ 23:25  --------------------------------------------------------  OUT: 400 mL      Physical Examination:  General: NAD, resting comfortably in bed  HEENT: Normocephalic atraumatic  Respiratory: Nonlabored respirations, normal CW expansion.  Cardio: S1S2, regular rate and rhythm. A line.   Abdomen: soft, nontender, nondistended, right groin incision c/d/i.   Vascular: extremities are warm and well perfused.     Assessment:  JOE NORTH is a 83yMale patient s/p Open repair of inguinal hernia using mesh in adult. Patient in the immediate postoperative period given hypotension got a bolus and albumin with adequate response, and since then has been hemodynamically stable on no pressor or ventilatory support. Patient to continue further monitoring in the SICU given age, comorbidities and requirement of fluid in the immediate postop period.     Plan:  - Care per SICU  - IVF  - Pain: Tylenol PO ATC  - Diet: Diet, Regular  - Monitor I/Os  - Drain: Monitor A line  - Activity: OOB/AAT  - DVT ppx: SCD's & SQH      B team [id number]

## 2024-03-19 NOTE — ASU DISCHARGE PLAN (ADULT/PEDIATRIC) - CARE PROVIDER_API CALL
Donita Gan [facility]  Surgery  [address] [place] [address]  Phone: [phone]  Fax: [phone]  Established Patient  Follow Up Time: 2 weeks

## 2024-03-19 NOTE — PACU DISCHARGE NOTE - NSPTMEETSDISCHCRITERIADT_GEN_A_CORE
19-Mar-2024 19:45 Pt's wife returns call. Message below relayed.  Appointment scheduled for Monday 10/30/23.  Wife denies further questions or concerns.

## 2024-03-19 NOTE — CHART NOTE - NSCHARTNOTEFT_GEN_A_CORE
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  Post operative course complicated by hypotensive episodes in PACU  requiring intermittent IVPs phenylephrine (500 mcg total). Pt is lethargic, but arousable. Endorses no acute pain complaints and shakes head "no" to CP, SOB, N/V, dizziness/lightheadness, HA.     On exam NAD, EOMI, PERRLA, lethargic but arouasble, answers appropriately .  Rt inguinal surgical site with dressing clean, dry, intact. Soft, no hematoma, no oozing or ecchymosis    PLAN:   -Pt received  cc and 5% 250 cc albumin x 1 with improvement in BP for short duration. Intermittent hypotension with SBPs 80s  treated with IVP phenylephedrine without gtt.   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  Post operative course complicated by hypotensive episodes in PACU  requiring intermittent IVPs phenylephrine (500 mcg total). Pt is lethargic, but arousable. Endorses no acute pain complaints and shakes head "no" to CP, SOB, N/V, dizziness/lightheadness, HA.     On exam NAD, EOMI, PERRLA, lethargic but arouasble, answers appropriately .  Rt inguinal surgical site with dressing clean, dry, intact. Soft, no hematoma, no oozing or ecchymosis    PLAN:   -Pt received  cc and 5% 250 cc albumin x 1 with improvement in BP for short duration. Intermittent hypotension with SBPs 80s  treated with IVP phenylephedrine without need for vasopressor gtt at this time.   -Surgical team aware, SICU consulted and accepted for further hemodynamic monitoring

## 2024-03-19 NOTE — ASU DISCHARGE PLAN (ADULT/PEDIATRIC) - NS MD DC FALL RISK RISK
For information on Fall & Injury Prevention, visit: [email or web address] OR  [email or web address] OR  https://www.cdc.gov/steadi/patient.html

## 2024-03-19 NOTE — CONSULT NOTE ADULT - ASSESSMENT
83y yo Male w/ PMHx of     PLAN:  Neuro:  - AOx3  - Pain control:     Respiratory:  - Saturating well on RA/Mechanical Ventilation:     Cardiovascular:  - Hemodynamically stable off pressors     Gastrointestinal:  - Diet:   - Bowel regimen:     Genitourinary/Renal:  - IVF:   - Voiding spontaneously/Otero     Heme:  - Chemical VTE ppx:   - Mechnical VTE ppx: SCDs while in bed    ID:   - Trend WBC on CBC qD  - Monitor fever curve    Endocrine:  - Trend FS q6/on BMP    Lines/Tubes/Drains:   - PIV    DISPO:   CODE STATUS:  83y yo Male w/ PMHx of NIDDM, NICM 2/2 cardiac amyloidosis, pHTN, HFpEF, prostate CA, now s/p open Right inguinal hernia repair with mesh. Patient tolerated procedure well, no complication. Patient hypotensive post-operatively, responded to 500cc albumin. SICU consulted for hemodynamic monitoring,     PLAN:  Neuro:  - AOx3  - Pain control: tylenol, oxy, fent prn    Respiratory:  - Saturating well on RA  - lungs CTAB    Cardiovascular:  - Hemodynamically stable off pressors  - monitor A-line pressures, currently 90s-100/50s  - h/o mod to severe pHTN and HFpEF (echo from 4/6/23 with EF of 53%)  - f/u EKG  - f/u TTE    Gastrointestinal:  - Diet: NPO    Genitourinary/Renal:  - IVF: LR at 100 cc/hr  - Voiding spontaneously/Otero     Heme:  - Chemical VTE ppx: SQH q8h  - Mechnical VTE ppx: SCDs while in bed    ID:   - Trend WBC on CBC qD  - Monitor fever curve    Endocrine:  - Trend FS q6/on BMP    Lines/Tubes/Drains:   - PIV    DISPO: SICU    CODE STATUS: Full

## 2024-03-19 NOTE — BRIEF OPERATIVE NOTE - NSICDXBRIEFPROCEDURE_GEN_ALL_CORE_FT
PROCEDURES:  Open repair of inguinal hernia using mesh in adult 19-Mar-2024 15:25:11  Marsha Munoz

## 2024-03-20 LAB
ANION GAP SERPL CALC-SCNC: 12 MMOL/L — SIGNIFICANT CHANGE UP (ref 7–14)
BUN SERPL-MCNC: 23 MG/DL — SIGNIFICANT CHANGE UP (ref 7–23)
CALCIUM SERPL-MCNC: 8.7 MG/DL — SIGNIFICANT CHANGE UP (ref 8.4–10.5)
CHLORIDE SERPL-SCNC: 105 MMOL/L — SIGNIFICANT CHANGE UP (ref 98–107)
CO2 SERPL-SCNC: 22 MMOL/L — SIGNIFICANT CHANGE UP (ref 22–31)
CREAT SERPL-MCNC: 1.06 MG/DL — SIGNIFICANT CHANGE UP (ref 0.5–1.3)
EGFR: 70 ML/MIN/1.73M2 — SIGNIFICANT CHANGE UP
GLUCOSE BLDC GLUCOMTR-MCNC: 123 MG/DL — HIGH (ref 70–99)
GLUCOSE BLDC GLUCOMTR-MCNC: 128 MG/DL — HIGH (ref 70–99)
GLUCOSE BLDC GLUCOMTR-MCNC: 137 MG/DL — HIGH (ref 70–99)
GLUCOSE BLDC GLUCOMTR-MCNC: 150 MG/DL — HIGH (ref 70–99)
GLUCOSE BLDC GLUCOMTR-MCNC: 154 MG/DL — HIGH (ref 70–99)
GLUCOSE BLDC GLUCOMTR-MCNC: 162 MG/DL — HIGH (ref 70–99)
GLUCOSE SERPL-MCNC: 178 MG/DL — HIGH (ref 70–99)
HCT VFR BLD CALC: 29.8 % — LOW (ref 39–50)
HGB BLD-MCNC: 10.1 G/DL — LOW (ref 13–17)
MAGNESIUM SERPL-MCNC: 1.8 MG/DL — SIGNIFICANT CHANGE UP (ref 1.6–2.6)
MCHC RBC-ENTMCNC: 27.7 PG — SIGNIFICANT CHANGE UP (ref 27–34)
MCHC RBC-ENTMCNC: 33.9 GM/DL — SIGNIFICANT CHANGE UP (ref 32–36)
MCV RBC AUTO: 81.9 FL — SIGNIFICANT CHANGE UP (ref 80–100)
NRBC # BLD: 0 /100 WBCS — SIGNIFICANT CHANGE UP (ref 0–0)
NRBC # FLD: 0 K/UL — SIGNIFICANT CHANGE UP (ref 0–0)
PHOSPHATE SERPL-MCNC: 4.3 MG/DL — SIGNIFICANT CHANGE UP (ref 2.5–4.5)
PLATELET # BLD AUTO: 176 K/UL — SIGNIFICANT CHANGE UP (ref 150–400)
POTASSIUM SERPL-MCNC: 4.4 MMOL/L — SIGNIFICANT CHANGE UP (ref 3.5–5.3)
POTASSIUM SERPL-SCNC: 4.4 MMOL/L — SIGNIFICANT CHANGE UP (ref 3.5–5.3)
RBC # BLD: 3.64 M/UL — LOW (ref 4.2–5.8)
RBC # FLD: 18.6 % — HIGH (ref 10.3–14.5)
SODIUM SERPL-SCNC: 139 MMOL/L — SIGNIFICANT CHANGE UP (ref 135–145)
WBC # BLD: 7.47 K/UL — SIGNIFICANT CHANGE UP (ref 3.8–10.5)
WBC # FLD AUTO: 7.47 K/UL — SIGNIFICANT CHANGE UP (ref 3.8–10.5)

## 2024-03-20 PROCEDURE — 99222 1ST HOSP IP/OBS MODERATE 55: CPT

## 2024-03-20 RX ORDER — OXYCODONE HYDROCHLORIDE 5 MG/1
2.5 TABLET ORAL EVERY 4 HOURS
Refills: 0 | Status: DISCONTINUED | OUTPATIENT
Start: 2024-03-20 | End: 2024-03-21

## 2024-03-20 RX ORDER — MAGNESIUM SULFATE 500 MG/ML
1 VIAL (ML) INJECTION ONCE
Refills: 0 | Status: COMPLETED | OUTPATIENT
Start: 2024-03-20 | End: 2024-03-20

## 2024-03-20 RX ORDER — MAGNESIUM SULFATE 500 MG/ML
2 VIAL (ML) INJECTION ONCE
Refills: 0 | Status: DISCONTINUED | OUTPATIENT
Start: 2024-03-20 | End: 2024-03-20

## 2024-03-20 RX ORDER — OXYCODONE HYDROCHLORIDE 5 MG/1
5 TABLET ORAL EVERY 4 HOURS
Refills: 0 | Status: DISCONTINUED | OUTPATIENT
Start: 2024-03-20 | End: 2024-03-21

## 2024-03-20 RX ORDER — IBUPROFEN 200 MG
400 TABLET ORAL EVERY 8 HOURS
Refills: 0 | Status: DISCONTINUED | OUTPATIENT
Start: 2024-03-20 | End: 2024-03-21

## 2024-03-20 RX ADMIN — Medication 100 GRAM(S): at 01:56

## 2024-03-20 RX ADMIN — Medication 650 MILLIGRAM(S): at 05:50

## 2024-03-20 RX ADMIN — Medication 650 MILLIGRAM(S): at 11:11

## 2024-03-20 RX ADMIN — Medication 400 MILLIGRAM(S): at 21:44

## 2024-03-20 RX ADMIN — Medication 650 MILLIGRAM(S): at 18:05

## 2024-03-20 RX ADMIN — Medication 650 MILLIGRAM(S): at 06:20

## 2024-03-20 RX ADMIN — Medication 1: at 11:10

## 2024-03-20 RX ADMIN — Medication 400 MILLIGRAM(S): at 21:14

## 2024-03-20 RX ADMIN — HEPARIN SODIUM 5000 UNIT(S): 5000 INJECTION INTRAVENOUS; SUBCUTANEOUS at 14:03

## 2024-03-20 RX ADMIN — Medication 400 MILLIGRAM(S): at 09:31

## 2024-03-20 RX ADMIN — HEPARIN SODIUM 5000 UNIT(S): 5000 INJECTION INTRAVENOUS; SUBCUTANEOUS at 21:14

## 2024-03-20 RX ADMIN — Medication 400 MILLIGRAM(S): at 10:00

## 2024-03-20 RX ADMIN — HEPARIN SODIUM 5000 UNIT(S): 5000 INJECTION INTRAVENOUS; SUBCUTANEOUS at 05:51

## 2024-03-20 NOTE — PROGRESS NOTE ADULT - NUTRITIONAL ASSESSMENT
83yoM with PMHx as above, now s/p procedure as stated above    - Per SICU patient is downgraded   - Monitor BP closely   - Regular diet  - Pain Control  - PT Eval   - DVT PPx   - Dispo: likely tomorrow pending PT recs    BTeam   P. [phone]

## 2024-03-20 NOTE — PATIENT PROFILE ADULT - FALL HARM RISK - HARM RISK INTERVENTIONS
Assistance with ambulation/Assistance OOB with selected safe patient handling equipment/Communicate Risk of Fall with Harm to all staff/Discuss with provider need for PT consult/Monitor gait and stability/Provide patient with walking aids - walker, cane, crutches/Reinforce activity limits and safety measures with patient and family/Sit up slowly, dangle for a short time, stand at bedside before walking/Tailored Fall Risk Interventions/Use of alarms - bed, chair and/or voice tab/Visual Cue: Yellow wristband and red socks/Bed in lowest position, wheels locked, appropriate side rails in place/Call bell, personal items and telephone in reach/Instruct patient to call for assistance before getting out of bed or chair/Non-slip footwear when patient is out of bed/[place] to call system/Physically safe environment - no spills, clutter or unnecessary equipment/Purposeful Proactive Rounding/Room/bathroom lighting operational, light cord in reach

## 2024-03-20 NOTE — PROGRESS NOTE ADULT - ATTENDING COMMENTS
I agree with the detailed interval history, physical, and plan, which I have reviewed and edited where appropriate'; also agree with notes/assessment with my team on service.  I have personally examined the patient.  I was physically present for the key portions of the evaluation and management (E/M) service provided.  I reviewed all the pertinent data.  The patient is a critical care patient with life threatening hemodynamic and metabolic instability in SICU.  The SICU team has a constant risk benefit analyzes discussion and coordinating care with the primary team and all consultants.   The patient is in SICU with the chief complaint and diagnosis mentioned in the note.   The plan will be specified in the note.  83y yo Male with cardiac amyloidosis, s/p open Right inguinal hernia repair with mesh. Patient hypotensive post-operatively in SICU for hemodynamic monitoring,   PHYSICAL EXAM:  NEURO  Exam: NAD  RESPIRATORY  Exam: clear  CARDIOVASCULAR  Exam: RR  ABDOMEN:  Exam: soft, nondistended  GENITOURINARY/RENAL  Exam: R groin incision  PLAN:  Neuro:  - tylenol  -fentanyl   Respiratory:  - RA  Cardiovascular:  - f/u TTE  Gastrointestinal:  - Diet: NPO  Genitourinary/Renal:  - LR at 100 cc/hr  Heme:  - SQH   - Trend WBC   Endocrine:  - Trend FS   DISPO: dc floor    CODE STATUS: Full

## 2024-03-21 ENCOUNTER — RESULT REVIEW (OUTPATIENT)
Age: 84
End: 2024-03-21

## 2024-03-21 ENCOUNTER — TRANSCRIPTION ENCOUNTER (OUTPATIENT)
Age: 84
End: 2024-03-21

## 2024-03-21 VITALS
SYSTOLIC BLOOD PRESSURE: 110 MMHG | DIASTOLIC BLOOD PRESSURE: 73 MMHG | HEART RATE: 87 BPM | RESPIRATION RATE: 18 BRPM | TEMPERATURE: 98 F | OXYGEN SATURATION: 100 %

## 2024-03-21 LAB
GLUCOSE BLDC GLUCOMTR-MCNC: 108 MG/DL — HIGH (ref 70–99)
GLUCOSE BLDC GLUCOMTR-MCNC: 139 MG/DL — HIGH (ref 70–99)
GLUCOSE BLDC GLUCOMTR-MCNC: 91 MG/DL — SIGNIFICANT CHANGE UP (ref 70–99)

## 2024-03-21 RX ORDER — ACETAMINOPHEN 500 MG
2 TABLET ORAL
Qty: 0 | Refills: 0 | DISCHARGE
Start: 2024-03-21

## 2024-03-21 RX ORDER — IBUPROFEN 200 MG
1 TABLET ORAL
Qty: 0 | Refills: 0 | DISCHARGE
Start: 2024-03-21

## 2024-03-21 RX ADMIN — Medication 650 MILLIGRAM(S): at 00:29

## 2024-03-21 RX ADMIN — HEPARIN SODIUM 5000 UNIT(S): 5000 INJECTION INTRAVENOUS; SUBCUTANEOUS at 05:20

## 2024-03-21 RX ADMIN — Medication 650 MILLIGRAM(S): at 18:06

## 2024-03-21 RX ADMIN — Medication 400 MILLIGRAM(S): at 16:20

## 2024-03-21 RX ADMIN — Medication 400 MILLIGRAM(S): at 05:20

## 2024-03-21 RX ADMIN — Medication 650 MILLIGRAM(S): at 17:36

## 2024-03-21 RX ADMIN — Medication 650 MILLIGRAM(S): at 13:03

## 2024-03-21 RX ADMIN — Medication 650 MILLIGRAM(S): at 05:20

## 2024-03-21 RX ADMIN — Medication 400 MILLIGRAM(S): at 05:45

## 2024-03-21 RX ADMIN — Medication 400 MILLIGRAM(S): at 15:20

## 2024-03-21 RX ADMIN — HEPARIN SODIUM 5000 UNIT(S): 5000 INJECTION INTRAVENOUS; SUBCUTANEOUS at 15:20

## 2024-03-21 RX ADMIN — Medication 650 MILLIGRAM(S): at 12:33

## 2024-03-21 RX ADMIN — Medication 650 MILLIGRAM(S): at 05:45

## 2024-03-21 NOTE — DISCHARGE NOTE PROVIDER - NS AS DC PROVIDER CONTACT Y/N MULTI
Can you please send in this rx for the patient. She has been waiting a couple of days since I have talked to her about the results. Yes

## 2024-03-21 NOTE — DISCHARGE NOTE PROVIDER - NSDCMRMEDTOKEN_GEN_ALL_CORE_FT
acetaminophen 325 mg oral tablet: 2 tab(s) orally every 6 hours  bumetanide 1 mg oral tablet: 1 tab(s) orally once a day  ibuprofen 400 mg oral tablet: 1 tab(s) orally every 8 hours  metFORMIN 500 mg oral tablet: 1 tab(s) orally 2 times a day  omeprazole 20 mg oral delayed release capsule: 1 tab(s) orally once a day  oxyCODONE 5 mg oral tablet: 1 tab(s) orally every 6 hours as needed for  severe pain MDD: 4  simvastatin 20 mg oral tablet: 1 tab(s) orally once a day (at bedtime)  spironolactone 25 mg oral tablet: 1 tab(s) orally once a day  Vyndamax 61 mg oral capsule: 1 cap(s) orally once a day

## 2024-03-21 NOTE — DISCHARGE NOTE PROVIDER - NSDCFUSCHEDAPPT_GEN_ALL_CORE_FT
Jason Laurent  [facility] Physician [facility]  CARDIOLOGY [facility]   Scheduled Appointment: 05/22/2024

## 2024-03-21 NOTE — DISCHARGE NOTE PROVIDER - NSDCCPCAREPLAN_GEN_ALL_CORE_FT
PRINCIPAL DISCHARGE DIAGNOSIS  Diagnosis: Right inguinal hernia  Assessment and Plan of Treatment: WOUND CARE:  Please keep incisions clean and dry. Please do not Scrub or rub incisions. Do not use lotion or powder on incisions.   BATHING: You may shower and/or sponge bathe. You may use warm soapy water in the shower and rinse, pat dry.  ACTIVITY: No heavy lifting or straining. Otherwise, you may return to your usual level of physical activity. If you are taking narcotic pain medication DO NOT drive a car, operate machinery or make important decisions.  DIET: Return to your usual diet.  NOTIFY YOUR SURGEON IF YOU HAVE: any bleeding that does not stop, any pus draining from your wound(s), any fever (over 100.4 F) persistent nausea/vomiting, or if your pain is not controlled on your discharge pain medications, unable to urinate.  Please follow up with your primary care physician in one week regarding your hospitalization, bring copies of your discharge paperwork.  Please follow up with your surgeon, Dr. Gan within 2 weeks of discharge.

## 2024-03-21 NOTE — DISCHARGE NOTE NURSING/CASE MANAGEMENT/SOCIAL WORK - PATIENT PORTAL LINK FT
You can access the FollowMyHealth Patient Portal offered by [facility] by registering at the following website: [email or web address]. By joining [facility]’s FollowMyHealth portal, you will also be able to view your health information using other applications (apps) compatible with our system.

## 2024-03-21 NOTE — PROGRESS NOTE ADULT - SUBJECTIVE AND OBJECTIVE BOX
Morning Surgical Progress Note  Patient is a 83y old  Male who presents with a chief complaint of inguinal hernia repair (19 Mar 2024 16:56)    SUBJECTIVE: Patient seen and examined at bedside with surgical team, patient complains of right groin pain, he is passing gas and having bm and ambulating with a pulido.    Vital Signs Last 24 Hrs  T(C): 37 (21 Mar 2024 02:00), Max: 37 (21 Mar 2024 02:00)  T(F): 98.6 (21 Mar 2024 02:00), Max: 98.6 (21 Mar 2024 02:00)  HR: 89 (21 Mar 2024 02:00) (82 - 93)  BP: 105/62 (21 Mar 2024 02:00) (90/51 - 118/73)  BP(mean): 63 (20 Mar 2024 15:20) (60 - 82)  RR: 18 (21 Mar 2024 02:00) (16 - 20)  SpO2: 100% (21 Mar 2024 02:00) (99% - 100%)    Parameters below as of 21 Mar 2024 02:00  Patient On (Oxygen Delivery Method): room air    I&O's Detail    20 Mar 2024 07:01  -  21 Mar 2024 07:00  --------------------------------------------------------  IN:    Oral Fluid: 551 mL  Total IN: 551 mL    OUT:    Voided (mL): 700 mL  Total OUT: 700 mL    Total NET: -149 mL        Medications  MEDICATIONS  (STANDING):  acetaminophen     Tablet .. 650 milliGRAM(s) Oral every 6 hours  dextrose 5%. 1000 milliLiter(s) (50 mL/Hr) IV Continuous <Continuous>  dextrose 5%. 1000 milliLiter(s) (100 mL/Hr) IV Continuous <Continuous>  dextrose 50% Injectable 25 Gram(s) IV Push once  dextrose 50% Injectable 12.5 Gram(s) IV Push once  dextrose 50% Injectable 25 Gram(s) IV Push once  glucagon  Injectable 1 milliGRAM(s) IntraMuscular once  heparin   Injectable 5000 Unit(s) SubCutaneous every 8 hours  ibuprofen  Tablet. 400 milliGRAM(s) Oral every 8 hours  insulin lispro (ADMELOG) corrective regimen sliding scale   SubCutaneous three times a day before meals  insulin lispro (ADMELOG) corrective regimen sliding scale   SubCutaneous at bedtime    MEDICATIONS  (PRN):  dextrose Oral Gel 15 Gram(s) Oral once PRN Blood Glucose LESS THAN 70 milliGRAM(s)/deciliter  oxyCODONE    IR 2.5 milliGRAM(s) Oral every 4 hours PRN Moderate Pain (4 - 6)  oxyCODONE    IR 5 milliGRAM(s) Oral every 4 hours PRN Severe Pain (7 - 10)    Physical Exam  Constitutional: A&Ox3, NAD  Gastrointestinal: Soft nontender, nondistended, right groin steristrips c/d/i, mildly tender on incision, no edema no hematoma    LABS:               Color: x / Appearance: x / SG: x / pH: x  Gluc: 178 mg/dL / Ketone: x  / Bili: x / Urobili: x   Blood: x / Protein: x / Nitrite: x   Leuk Esterase: x / RBC: x / WBC x   Sq Epi: x / Non Sq Epi: x / Bacteria: x      
SICU Daily Progress Note  =====================================================  Interval/Overnight Events:     - PRABHA ovn    ALLERGIES:  No Known Allergies      --------------------------------------------------------------------------------------    MEDICATIONS:    Neurologic Medications  acetaminophen     Tablet .. 650 milliGRAM(s) Oral every 6 hours    Respiratory Medications    Cardiovascular Medications    Gastrointestinal Medications  dextrose 5%. 1000 milliLiter(s) IV Continuous <Continuous>  dextrose 5%. 1000 milliLiter(s) IV Continuous <Continuous>    Genitourinary Medications    Hematologic/Oncologic Medications  heparin   Injectable 5000 Unit(s) SubCutaneous every 8 hours    Antimicrobial/Immunologic Medications    Endocrine/Metabolic Medications  dextrose 50% Injectable 25 Gram(s) IV Push once  dextrose 50% Injectable 12.5 Gram(s) IV Push once  dextrose 50% Injectable 25 Gram(s) IV Push once  dextrose Oral Gel 15 Gram(s) Oral once PRN Blood Glucose LESS THAN 70 milliGRAM(s)/deciliter  glucagon  Injectable 1 milliGRAM(s) IntraMuscular once  insulin lispro (ADMELOG) corrective regimen sliding scale   SubCutaneous three times a day before meals  insulin lispro (ADMELOG) corrective regimen sliding scale   SubCutaneous at bedtime    Topical/Other Medications    --------------------------------------------------------------------------------------    VITAL SIGNS:  ICU Vital Signs Last 24 Hrs  T(C): 36.6 (20 Mar 2024 00:00), Max: 36.7 (19 Mar 2024 14:10)  T(F): 97.9 (20 Mar 2024 00:00), Max: 98.1 (19 Mar 2024 14:10)  HR: 77 (20 Mar 2024 00:00) (64 - 88)  BP: 99/59 (20 Mar 2024 00:00) (86/50 - 110/65)  BP(mean): 68 (20 Mar 2024 00:00) (58 - 74)  ABP: 79/39 (20 Mar 2024 00:00) (79/39 - 108/57)  ABP(mean): 53 (20 Mar 2024 00:00) (53 - 75)  RR: 20 (20 Mar 2024 00:00) (10 - 23)  SpO2: 100% (20 Mar 2024 00:00) (98% - 100%)    O2 Parameters below as of 19 Mar 2024 21:00  Patient On (Oxygen Delivery Method): room air  --------------------------------------------------------------------------------------    INS AND OUTS:  I&O's Detail    19 Mar 2024 07:01  -  20 Mar 2024 00:56  --------------------------------------------------------  IN:    Oral Fluid: 240 mL  Total IN: 240 mL    OUT:    Voided (mL): 400 mL  Total OUT: 400 mL    Total NET: -160 mL  --------------------------------------------------------------------------------------    PHYSICAL EXAM:    NEURO  Exam: NAD, A&Ox3    RESPIRATORY  Exam: CTA B/L, no wheezing, satting well on RA    CARDIOVASCULAR  Exam: normal sinus rhythm, S1, S2, no rales    ABDOMEN:  Exam: soft, nondistended, nontender.  Diet: Regular CC    GENITOURINARY/RENAL  Exam: R groin incision sit, c/d/i with steristrips    METABOLIC / FLUIDS / ELECTROLYTES  dextrose 5%. 1000 milliLiter(s) IV Continuous <Continuous>  dextrose 5%. 1000 milliLiter(s) IV Continuous <Continuous>      HEMATOLOGIC  [x] VTE Prophylaxis: heparin   Injectable 5000 Unit(s) SubCutaneous every 8 hours    Transfusions:	[] PRBC	[] Platelets		[] FFP	[] Cryoprecipitate    INFECTIOUS DISEASE  Antimicrobials/Immunologic Medications:    Day #      of     ***    TUBES / LINES / DRAINS  ***  [x] Peripheral IV  [] Central Venous Line     	[] R	[] L	[] IJ	[] Fem	[] SC	Date Placed:   [] Arterial Line		[] R	[] L	[] Fem	[] Rad	[] Ax	Date Placed:   [] PICC		[] Midline		[] Mediport  [] Urinary Catheter		Date Placed:   [x] Necessity of urinary, arterial, and venous catheters discussed    --------------------------------------------------------------------------------------    LABS                          10.1   7.47  )-----------( 176      ( 20 Mar 2024 00:20 )             29.8   03-20    139  |  105  |  23  ----------------------------<  178<H>  4.4   |  22  |  1.06    Ca    8.7      20 Mar 2024 00:20  Phos  4.3     03-20  Mg     1.80     03-20      --------------------------------------------------------------------------------------    OTHER LABORATORY:     IMAGING STUDIES:   CXR:     
B Team Surgery     83y Male with PMH of NIDDM, NICM 2/2 cardiac amyloidosis, pHTN, HFpEF, prostate CA, now s/p open Right inguinal hernia repair with mesh. Post-op the patient required a fluid bolus and then pushes of neosynephrine to maintain MAP. SICU consulted and patient remained in PACU overnight. This morning he reports feeling well, he is voiding, but has not passed gas or had a BM. He reports some incisional pain.     ICU Vital Signs Last 24 Hrs  T(C): 36.8 (20 Mar 2024 10:00), Max: 36.8 (20 Mar 2024 06:00)  T(F): 98.2 (20 Mar 2024 10:00), Max: 98.3 (20 Mar 2024 06:00)  HR: 82 (20 Mar 2024 10:00) (64 - 85)  BP: 105/62 (20 Mar 2024 10:00) (86/50 - 110/65)  BP(mean): 71 (20 Mar 2024 10:00) (58 - 74)  ABP: 102/53 (20 Mar 2024 08:00) (74/37 - 108/57)  ABP(mean): 69 (20 Mar 2024 08:00) (50 - 75)  RR: 19 (20 Mar 2024 10:00) (10 - 23)  SpO2: 100% (20 Mar 2024 10:00) (98% - 100%)    O2 Parameters below as of 20 Mar 2024 10:00  Patient On (Oxygen Delivery Method): room air      I&O's Detail    19 Mar 2024 07:01  -  20 Mar 2024 07:00  --------------------------------------------------------  IN:    IV PiggyBack: 100 mL    Oral Fluid: 240 mL  Total IN: 340 mL    OUT:    Voided (mL): 550 mL  Total OUT: 550 mL    Total NET: -210 mL      20 Mar 2024 07:01  -  20 Mar 2024 11:28  --------------------------------------------------------  IN:    Oral Fluid: 236 mL  Total IN: 236 mL    OUT:    Voided (mL): 200 mL  Total OUT: 200 mL    Total NET: 36 mL                            10.1   7.47  )-----------( 176      ( 20 Mar 2024 00:20 )             29.8   03-20    139  |  105  |  23  ----------------------------<  178<H>  4.4   |  22  |  1.06    Ca    8.7      20 Mar 2024 00:20  Phos  4.3     03-20  Mg     1.80     03-20    Gen: NAD, A&Ox3  Chest: non labored breathing   Abd: soft, minimally TTP surrounding incision, no evidence of rebound or guarding  Ext: warm, well perfused

## 2024-03-21 NOTE — PHYSICAL THERAPY INITIAL EVALUATION ADULT - ADDITIONAL COMMENTS
Pt lives in a house with his wife with 8 steps to enter & a left-sided railing. Bedroom is located on the second floor which is accessed via chair lift. Pt utilizes cane at home. Pt lives in a house with his wife with 8 steps to enter & a left-sided railing. Bedroom is located on the second floor which is accessed via chair lift.     Of note, patient's wife is currently at rehab; therefore, patient will be alone at home upon discharge.

## 2024-03-21 NOTE — DISCHARGE NOTE NURSING/CASE MANAGEMENT/SOCIAL WORK - NSDCPEFALRISK_GEN_ALL_CORE
For information on Fall & Injury Prevention, visit: [email or web address] OR  [email or web address] OR  https://www.cdc.gov/steadi/patient.html

## 2024-03-21 NOTE — PHYSICAL THERAPY INITIAL EVALUATION ADULT - GENERAL OBSERVATIONS, REHAB EVAL
Upon entry; pt received seated at EOB in NAD. Pt HR 90 BPM. Pt left as received seated at EOB in NAD, & with a call bell in reach. Upon entry; pt received seated at EOB in NAD. Pt HR 90 BPM. Pt left as received in NAD, & with a call bell in reach.

## 2024-03-21 NOTE — PHYSICAL THERAPY INITIAL EVALUATION ADULT - NSPTDMEREC_GEN_A_CORE
Pt recommended single point cane; pt own single point cane; educated on use; verbalized understanding

## 2024-03-21 NOTE — PHYSICAL THERAPY INITIAL EVALUATION ADULT - PERTINENT HX OF CURRENT PROBLEM, REHAB EVAL
Pt is a 83 year old male presenting for scheduled surgery for inguinal hernia repair on 3/19. Echo results currently pending. Pt is a 83 year old male presenting for scheduled surgery for inguinal hernia repair on 3/19.

## 2024-03-21 NOTE — DISCHARGE NOTE PROVIDER - CARE PROVIDER_API CALL
Donita Gan [facility]  Surgery  [address] [place] [address]  Phone: [phone]  Fax: [phone]  Follow Up Time: 2 weeks

## 2024-03-21 NOTE — PHYSICAL THERAPY INITIAL EVALUATION ADULT - PATIENT PROFILE REVIEW, REHAB EVAL
Saad Received | Chart Reviewed | Pt agreeable to participate | ACTIVITY:/yes Saad Received | Chart Reviewed | Pt agreeable to participate | ACTIVITY: AMBULATE AS TOLERATED/yes

## 2024-03-21 NOTE — PHYSICAL THERAPY INITIAL EVALUATION ADULT - GAIT DEVIATIONS NOTED, PT EVAL
Forward flexed posture,/decreased maxine/decreased velocity of limb motion/decreased step length/decreased stride length/increased stride width/decreased weight-shifting ability Forward flexed posture; poor foot clearance bilaterally; lack of heel strike bilaterally/decreased maxine/decreased velocity of limb motion/decreased step length/decreased stride length/increased stride width/decreased weight-shifting ability

## 2024-03-21 NOTE — DISCHARGE NOTE PROVIDER - HOSPITAL COURSE
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 Patient hypotensive post-operatively, responded to 500cc albumin. Post operatively, patient requiring SICU admission for hemodynamic monitoring.  On 3/20, patient transferred from SICU to floors.   On 3/21, TTE performed. The patient's pain was controlled by IV pain medications and then by PO pain medications. The patient was advanced to a regular diet and tolerated it well. The patient was placed on home medications. At the time of discharge, the patient was hemodynamically stable, was tolerating PO diet, was voiding urine and passing stool, was ambulating, and was comfortable with adequate pain control. The patient was instructed to follow up with Dr. Cavanaugh within 2 weeks after discharge from the hospital. The patient/family felt comfortable with discharge. The patient had no other issues.

## 2024-03-21 NOTE — PROGRESS NOTE ADULT - ASSESSMENT
83yoM s/p open right inguinal hernia repair with mesh    - Regular diet  - Pain Control  - PT Eval   - DVT PPx   - Echo  - Dispo: DC
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 Patient tolerated procedure well, no complication. Patient hypotensive post-operatively, responded to 500cc albumin. SICU consulted for hemodynamic monitoring,     PLAN:  Neuro:  - AOx3  - Pain control: tylenol, oxy, fent prn    Respiratory:  - Saturating well on RA  - lungs CTAB    Cardiovascular:  - Hemodynamically stable off pressors  - monitor A-line pressures, currently 90s-100/50s  - h/o mod to severe pHTN and HFpEF (echo from 4/6/23 with EF of 53%)  - f/u EKG  - f/u TTE    Gastrointestinal:  - Diet: NPO    Genitourinary/Renal:  - IVF: LR at 100 cc/hr  - Voiding spontaneously/Otero     Heme:  - Chemical VTE ppx: SQH q8h  - Mechnical VTE ppx: SCDs while in bed    ID:   - Trend WBC on CBC qD  - Monitor fever curve    Endocrine:  - Trend FS q6/on BMP    Lines/Tubes/Drains:   - PIV    DISPO: PACU under SICU care    CODE STATUS: Full

## 2024-04-04 PROBLEM — E85.4 ORGAN-LIMITED AMYLOIDOSIS: Chronic | Status: ACTIVE | Noted: 2024-03-15

## 2024-04-04 PROBLEM — K40.90 UNILATERAL INGUINAL HERNIA, WITHOUT OBSTRUCTION OR GANGRENE, NOT SPECIFIED AS RECURRENT: Chronic | Status: ACTIVE | Noted: 2024-03-15

## 2024-04-04 PROBLEM — I27.21 SECONDARY PULMONARY ARTERIAL HYPERTENSION: Chronic | Status: ACTIVE | Noted: 2024-03-15

## 2024-04-09 ENCOUNTER — APPOINTMENT (OUTPATIENT)
Dept: TRAUMA SURGERY | Facility: HOSPITAL | Age: 84
End: 2024-04-09
Payer: MEDICARE

## 2024-04-09 VITALS
SYSTOLIC BLOOD PRESSURE: 118 MMHG | WEIGHT: 133 LBS | HEIGHT: 63 IN | TEMPERATURE: 98 F | HEART RATE: 104 BPM | DIASTOLIC BLOOD PRESSURE: 58 MMHG | BODY MASS INDEX: 23.57 KG/M2

## 2024-04-09 PROCEDURE — 99024 POSTOP FOLLOW-UP VISIT: CPT

## 2024-04-09 NOTE — PHYSICAL EXAM
[Normal Breath Sounds] : Normal breath sounds [Normal Heart Sounds] : normal heart sounds [No Rash or Lesion] : No rash or lesion [Calm] : calm [de-identified] : NAD, frail appearing, alert and oriented x3 [de-identified] : Soft, nondistended, nontender, R groin incision healing well, mild induration, no erythema or drainage, no seroma, no palpable recurrence.  [de-identified] : No deformities, ambulates with cane

## 2024-04-09 NOTE — ASSESSMENT
[FreeTextEntry1] : 84yo M s/p open R inguinal hernia repair with mesh (progrip) on 3/19/24 presents for routine follow up, recovering as expected.

## 2024-05-22 ENCOUNTER — APPOINTMENT (OUTPATIENT)
Dept: CARDIOLOGY | Facility: CLINIC | Age: 84
End: 2024-05-22

## 2024-06-13 ENCOUNTER — APPOINTMENT (OUTPATIENT)
Dept: CARDIOLOGY | Facility: CLINIC | Age: 84
End: 2024-06-13
Payer: MEDICARE

## 2024-06-13 ENCOUNTER — NON-APPOINTMENT (OUTPATIENT)
Age: 84
End: 2024-06-13

## 2024-06-13 VITALS
OXYGEN SATURATION: 100 % | HEART RATE: 90 BPM | SYSTOLIC BLOOD PRESSURE: 119 MMHG | BODY MASS INDEX: 23.56 KG/M2 | DIASTOLIC BLOOD PRESSURE: 68 MMHG | WEIGHT: 133 LBS

## 2024-06-13 DIAGNOSIS — E85.4 ORGAN-LIMITED AMYLOIDOSIS: ICD-10-CM

## 2024-06-13 DIAGNOSIS — G63 NEUROPATHIC HEREDOFAMILIAL AMYLOIDOSIS: ICD-10-CM

## 2024-06-13 DIAGNOSIS — E11.9 TYPE 2 DIABETES MELLITUS W/OUT COMPLICATIONS: ICD-10-CM

## 2024-06-13 DIAGNOSIS — I43 ORGAN-LIMITED AMYLOIDOSIS: ICD-10-CM

## 2024-06-13 DIAGNOSIS — I25.84 ATHEROSCLEROTIC HEART DISEASE OF NATIVE CORONARY ARTERY W/OUT ANGINA PECTORIS: ICD-10-CM

## 2024-06-13 DIAGNOSIS — E85.1 NEUROPATHIC HEREDOFAMILIAL AMYLOIDOSIS: ICD-10-CM

## 2024-06-13 DIAGNOSIS — I51.7 CARDIOMEGALY: ICD-10-CM

## 2024-06-13 DIAGNOSIS — I25.10 ATHEROSCLEROTIC HEART DISEASE OF NATIVE CORONARY ARTERY W/OUT ANGINA PECTORIS: ICD-10-CM

## 2024-06-13 PROCEDURE — G2211 COMPLEX E/M VISIT ADD ON: CPT

## 2024-06-13 PROCEDURE — 93000 ELECTROCARDIOGRAM COMPLETE: CPT

## 2024-06-13 PROCEDURE — 99215 OFFICE O/P EST HI 40 MIN: CPT

## 2024-06-13 RX ORDER — TAFAMIDIS 61 MG/1
61 CAPSULE, LIQUID FILLED ORAL
Qty: 90 | Refills: 3 | Status: ACTIVE | COMMUNITY
Start: 2022-07-15

## 2024-06-17 ENCOUNTER — NON-APPOINTMENT (OUTPATIENT)
Age: 84
End: 2024-06-17

## 2024-06-17 NOTE — END OF VISIT
[Time Spent: ___ minutes] : I have spent [unfilled] minutes of time on the encounter. [FreeTextEntry3] : I, Jason Laurent MD, personally performed the evaluation and management (E/M) services for this established patient who presents today with (a) new problem(s)/exacerbation of (an) existing condition(s). That E/M includes conducting the clinically appropriate interval history &/or exam, assessing all new/exacerbated conditions, and establishing a new plan of care. Today, Esthela Gandhi NP was here to observe my evaluation and management service for this new problem/exacerbated condition and follow the plan of care established by me going forward.

## 2024-06-17 NOTE — HISTORY OF PRESENT ILLNESS
[FreeTextEntry1] : Patient is an 82 year-old Black gentleman with known past medical history of noninsulin dependent type II diabetes, prostate ca, who has been experiencing bilateral lower extremity edema for more than a year, recently admitted to [facility], followed-up as outpatient with Leo Quesada MD, and was noted to have evidence of cardiac amyloidosis. Technetium pyrophosphate scan was positive.  Of note, patient reports neuropathy in his fingers and toes that has been present and progressive for at least the past several years.   Patient's 41 year-old son was recently diagnosed with hereditary TTR amyloidosis.   Patient has not been sick with Covid-19. He has received two shots and a booster, all from [employer].  August 2022 - Patient returns today for follow-up. He started Vyndamax 61 mg daily approximately two weeks ago. He does not notice any change in his condition. He has run out of his loop diuretics, and so he has not been on any diuretic recently.   9/16/2022. Pedro Jurado returns today for scheduled follow up. He is accompanied by his daughter, Radha. Today he is feeling well. He has been taking Vyndamax and furosemide without any difficulty. He has been using a cane when he walks for gait stabilization, but otherwise remains independent. His only complaint today is for neuropathy in his fingers and some lower back pain.  January 2023 - Patient returns today for follow-up in his usual state of health. Patient stopped his Vyndamax on January 1 because he thought it was causing urinary frequency.  He does not believe he is currently taking a diuretic. He has bilateral numbness in the hands, and he has chronic unsteadiness that was previously attributed to Menieres disease.   May 2023 - Patient returns today for follow-up in his usual state of health. He remains on Vyndamax, but he has not yet started silencer therapy.  July 2023 - Patient returns today for follow-up in his usual state of health. He remains on Vyndamax, but he has not yet started silencer therapy. He was hospitalized at [facility] in April 2023 because he discontinued his medications at home.  Since he has been home, he has been compliant on his medications. He is doing much better since he is home.  2/21/2024 Pedro Jurado returns today for preoperative cardiovascular evaluation prior to planned inguinal hernia repair with Donita Gan MD at [facility]. He has not yet scheduled the procedure pending cardiac clearance. As he returns today he is feeling generally well. He reports that his breathing has been stable. He denies any dyspnea, orthopnea or peripheral edema. He continues to have neuropathy which he feels as constant numbness in his hands and fingers. He denies any chest discomfort, shortness of breath, palpitations, lightheadedness or syncope. We have reviewed his medications and he is taking all as directed.

## 2024-06-17 NOTE — DISCUSSION/SUMMARY
[EKG obtained to assist in diagnosis and management of assessed problem(s)] : EKG obtained to assist in diagnosis and management of assessed problem(s) [FreeTextEntry1] : Patient is an 83-year-old Black gentleman with history as above who is referred now for evaluation and management of cardiac amyloidosis.  Light chain studies excluded AL-CA (normal kappa: lambda ratio), confirming diagnosis of TTR amyloidosis. 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  Continue TTR stabilizer therapy with Vyndamax. Recommend starting Amvuttra for patient with hereditary TTR amyloidosis with polyneuropathy. Patient will reconsider starting therapy. Previously he declined as he did not want to take an injectable medication. We again discussed the potential benefit of therapy. He will think about it.   No changes to his medications were recommended.  Labs today.  Follow up in 3 months, or sooner should need be.

## 2024-06-17 NOTE — REASON FOR VISIT
[Other: ____] : [unfilled] [Other: _____] : [unfilled] [FreeTextEntry3] : Neal Seaman MD [FreeTextEntry1] : 6/13/2024 Pedro Jurado returns today for routine scheduled follow up.  His hernia repair went well and he now feels that he is back to baseline. Last month his wife passed after she was diagnosed with late-stage ovarian cancer in the nursing home. Otherwise he is feeling well. He remains mostly sedentary but is looking forward to a BBQ at his daughter's house to celebrate Father's Day. He denies any chest discomfort, shortness of breath, palpitations, lightheadedness or syncope. He has not been weighing himself at home, but he believes his weight is stable. He continues to report significant neuropathy of both his hands. He is unable to button his shirt and reports that there is near constant numbness throughout his fingers.

## 2024-06-17 NOTE — CARDIOLOGY SUMMARY
[de-identified] : 5/17/2022, NSR with 1st degree AVB, LAE. Low voltage in the limb leads. 2/21/2024, NSR with first degree AV delay, Jazzmine = 244 msec, diffuse nonspecific T-abnormality, ,ow voltage in the limb leads, unchanged.  [de-identified] : 5/6/2022, septum 1.3 cm, PWT 1.2 cm, dilated LA, mild concentric LVH, severe pulmonary hypertension, LVEF 57% 4/6/2023, TTE.  1. Calcified trileaflet aortic valve with normal opening. 2. Moderately dilated left atrium.  LA volume index = 45 cc/m2. 3. Severe concentric left ventricular hypertrophy. 4. Low normal left ventricular systolic function.  [de-identified] : 6/21/2022, technetium pyrophosphate scan, grade 3, ratio 1.9 [de-identified] : 5/9/2022 with Mahamed Nguyen MD at [facility] - patent coronary arteries, mild pulmonary hypertension

## 2024-06-21 LAB
ALBUMIN SERPL ELPH-MCNC: 4.3 G/DL
ALP BLD-CCNC: 103 U/L
ALT SERPL-CCNC: 18 U/L
ANION GAP SERPL CALC-SCNC: 14 MMOL/L
AST SERPL-CCNC: 29 U/L
BILIRUB SERPL-MCNC: 0.3 MG/DL
BUN SERPL-MCNC: 28 MG/DL
CALCIUM SERPL-MCNC: 9.7 MG/DL
CHLORIDE SERPL-SCNC: 103 MMOL/L
CO2 SERPL-SCNC: 21 MMOL/L
CREAT SERPL-MCNC: 1 MG/DL
EGFR: 75 ML/MIN/1.73M2
GLUCOSE SERPL-MCNC: 110 MG/DL
HCT VFR BLD CALC: 36.7 %
HGB BLD-MCNC: 12.5 G/DL
MCHC RBC-ENTMCNC: 27.9 PG
MCHC RBC-ENTMCNC: 34.1 GM/DL
MCV RBC AUTO: 81.9 FL
NT-PROBNP SERPL-MCNC: 2122 PG/ML
PLATELET # BLD AUTO: 183 K/UL
POTASSIUM SERPL-SCNC: 5.4 MMOL/L
PREALB SERPL NEPH-MCNC: 26 MG/DL
PROT SERPL-MCNC: 7.2 G/DL
RBC # BLD: 4.48 M/UL
RBC # FLD: 16.7 %
SODIUM SERPL-SCNC: 137 MMOL/L
WBC # FLD AUTO: 4.6 K/UL

## 2024-10-17 ENCOUNTER — APPOINTMENT (OUTPATIENT)
Dept: CARDIOLOGY | Facility: CLINIC | Age: 84
End: 2024-10-17

## 2025-01-28 ENCOUNTER — NON-APPOINTMENT (OUTPATIENT)
Age: 85
End: 2025-01-28

## 2025-01-28 ENCOUNTER — APPOINTMENT (OUTPATIENT)
Dept: CARDIOLOGY | Facility: CLINIC | Age: 85
End: 2025-01-28
Payer: MEDICARE

## 2025-01-28 VITALS
BODY MASS INDEX: 25.16 KG/M2 | HEIGHT: 63 IN | OXYGEN SATURATION: 98 % | HEART RATE: 72 BPM | WEIGHT: 142 LBS | SYSTOLIC BLOOD PRESSURE: 108 MMHG | DIASTOLIC BLOOD PRESSURE: 76 MMHG

## 2025-01-28 DIAGNOSIS — Z15.89 GENETIC SUSCEPTIBILITY TO OTHER DISEASE: ICD-10-CM

## 2025-01-28 DIAGNOSIS — R60.0 LOCALIZED EDEMA: ICD-10-CM

## 2025-01-28 DIAGNOSIS — I43 ORGAN-LIMITED AMYLOIDOSIS: ICD-10-CM

## 2025-01-28 DIAGNOSIS — E85.4 ORGAN-LIMITED AMYLOIDOSIS: ICD-10-CM

## 2025-01-28 DIAGNOSIS — E11.9 TYPE 2 DIABETES MELLITUS W/OUT COMPLICATIONS: ICD-10-CM

## 2025-01-28 PROCEDURE — G2211 COMPLEX E/M VISIT ADD ON: CPT

## 2025-01-28 PROCEDURE — 99215 OFFICE O/P EST HI 40 MIN: CPT

## 2025-01-28 PROCEDURE — 93000 ELECTROCARDIOGRAM COMPLETE: CPT

## 2025-01-29 LAB
ALBUMIN SERPL ELPH-MCNC: 4.3 G/DL
ALP BLD-CCNC: 112 U/L
ALT SERPL-CCNC: 15 U/L
ANION GAP SERPL CALC-SCNC: 16 MMOL/L
AST SERPL-CCNC: 25 U/L
BASOPHILS # BLD AUTO: 0.05 K/UL
BASOPHILS NFR BLD AUTO: 1 %
BILIRUB SERPL-MCNC: 0.5 MG/DL
BUN SERPL-MCNC: 27 MG/DL
CALCIUM SERPL-MCNC: 9.5 MG/DL
CHLORIDE SERPL-SCNC: 104 MMOL/L
CO2 SERPL-SCNC: 23 MMOL/L
CREAT SERPL-MCNC: 1.13 MG/DL
EGFR: 64 ML/MIN/1.73M2
EOSINOPHIL # BLD AUTO: 0.35 K/UL
EOSINOPHIL NFR BLD AUTO: 7 %
GLUCOSE SERPL-MCNC: 63 MG/DL
HCT VFR BLD CALC: 43.3 %
HGB BLD-MCNC: 13.7 G/DL
IMM GRANULOCYTES NFR BLD AUTO: 0.2 %
LYMPHOCYTES # BLD AUTO: 1.85 K/UL
LYMPHOCYTES NFR BLD AUTO: 36.9 %
MAN DIFF?: NORMAL
MCHC RBC-ENTMCNC: 27.4 PG
MCHC RBC-ENTMCNC: 31.6 G/DL
MCV RBC AUTO: 86.6 FL
MONOCYTES # BLD AUTO: 0.33 K/UL
MONOCYTES NFR BLD AUTO: 6.6 %
NEUTROPHILS # BLD AUTO: 2.42 K/UL
NEUTROPHILS NFR BLD AUTO: 48.3 %
NT-PROBNP SERPL-MCNC: 3290 PG/ML
PLATELET # BLD AUTO: 243 K/UL
POTASSIUM SERPL-SCNC: 6.2 MMOL/L
PREALB SERPL NEPH-MCNC: 23 MG/DL
PROT SERPL-MCNC: 7.3 G/DL
RBC # BLD: 5 M/UL
RBC # FLD: 17.7 %
SODIUM SERPL-SCNC: 143 MMOL/L
WBC # FLD AUTO: 5.01 K/UL

## 2025-01-29 RX ORDER — DAPAGLIFLOZIN 10 MG/1
10 TABLET, FILM COATED ORAL
Qty: 90 | Refills: 3 | Status: ACTIVE | COMMUNITY
Start: 2025-01-29 | End: 1900-01-01

## 2025-02-08 LAB
ANION GAP SERPL CALC-SCNC: 12 MMOL/L
BUN SERPL-MCNC: 30 MG/DL
CALCIUM SERPL-MCNC: 9.7 MG/DL
CHLORIDE SERPL-SCNC: 104 MMOL/L
CO2 SERPL-SCNC: 26 MMOL/L
CREAT SERPL-MCNC: 1.28 MG/DL
EGFR: 56 ML/MIN/1.73M2
GLUCOSE SERPL-MCNC: 107 MG/DL
POTASSIUM SERPL-SCNC: 4.7 MMOL/L
SODIUM SERPL-SCNC: 141 MMOL/L

## 2025-04-23 ENCOUNTER — APPOINTMENT (OUTPATIENT)
Dept: CARDIOLOGY | Facility: CLINIC | Age: 85
End: 2025-04-23

## 2025-04-24 ENCOUNTER — APPOINTMENT (OUTPATIENT)
Dept: CARDIOLOGY | Facility: CLINIC | Age: 85
End: 2025-04-24

## (undated) DEVICE — SOL IRR POUR H2O 500ML

## (undated) DEVICE — DRSG DERMABOND 0.7ML

## (undated) DEVICE — TROCAR COVIDIEN VERSAPORT BLADELESS OPTICAL 5MM STANDARD

## (undated) DEVICE — POSITIONER STRAP ARMBOARD VELCRO TS-30

## (undated) DEVICE — SUT SILK 0 24" SH DA

## (undated) DEVICE — VENODYNE/SCD SLEEVE CALF MEDIUM

## (undated) DEVICE — SOL IRR POUR NS 0.9% 1000ML

## (undated) DEVICE — SUT MONOCRYL 4-0 27" PS-2 UNDYED

## (undated) DEVICE — ELCTR BOVIE PENCIL SMOKE EVACUATION

## (undated) DEVICE — BLADE SURGICAL #15 CARBON

## (undated) DEVICE — PACK GENERAL LAPAROSCOPY

## (undated) DEVICE — BASIN SET SINGLE

## (undated) DEVICE — PROTECTOR HEEL / ELBOW FLUFFY

## (undated) DEVICE — TUBING INSUFFLATION LAP FILTER 10FT

## (undated) DEVICE — DRAPE TOWEL BLUE 17" X 24"

## (undated) DEVICE — ELCTR BOVIE TIP BLADE INSULATED 2.75" EDGE

## (undated) DEVICE — TUBING OLYMPUS INSUFFLATION

## (undated) DEVICE — ELCTR GROUNDING PAD ADULT COVIDIEN

## (undated) DEVICE — TUBING STRYKEFLOW II SUCTION / IRRIGATOR

## (undated) DEVICE — GLV 7 PROTEXIS (WHITE)

## (undated) DEVICE — SUT SILK 2-0 30" SH

## (undated) DEVICE — FOLEY TRAY 16FR 5CC LF UMETER CLOSED

## (undated) DEVICE — DRAPE 3/4 SHEET 52X76"

## (undated) DEVICE — D HELP - CLEARVIEW CLEARIFY SYSTEM

## (undated) DEVICE — SUT VICRYL 0 27" UR-6